# Patient Record
Sex: MALE | Race: OTHER | NOT HISPANIC OR LATINO | Employment: FULL TIME | URBAN - METROPOLITAN AREA
[De-identification: names, ages, dates, MRNs, and addresses within clinical notes are randomized per-mention and may not be internally consistent; named-entity substitution may affect disease eponyms.]

---

## 2017-11-04 ENCOUNTER — NON-PROVIDER VISIT (OUTPATIENT)
Dept: URGENT CARE | Facility: CLINIC | Age: 42
End: 2017-11-04

## 2017-11-04 DIAGNOSIS — Z02.1 PRE-EMPLOYMENT DRUG SCREENING: ICD-10-CM

## 2017-11-04 LAB
AMP AMPHETAMINE: NORMAL
COC COCAINE: NORMAL
INT CON NEG: NORMAL
INT CON POS: NORMAL
MET METHAMPHETAMINES: NORMAL
OPI OPIATES: NORMAL
PCP PHENCYCLIDINE: NORMAL
POC DRUG COMMENT 753798-OCCUPATIONAL HEALTH: NORMAL
THC: NORMAL

## 2017-11-04 PROCEDURE — 80305 DRUG TEST PRSMV DIR OPT OBS: CPT | Performed by: NURSE PRACTITIONER

## 2018-07-19 ENCOUNTER — HOSPITAL ENCOUNTER (EMERGENCY)
Facility: MEDICAL CENTER | Age: 43
End: 2018-07-19
Attending: EMERGENCY MEDICINE
Payer: MEDICAID

## 2018-07-19 VITALS
WEIGHT: 150 LBS | BODY MASS INDEX: 23.54 KG/M2 | HEIGHT: 67 IN | DIASTOLIC BLOOD PRESSURE: 79 MMHG | HEART RATE: 88 BPM | TEMPERATURE: 98.9 F | SYSTOLIC BLOOD PRESSURE: 108 MMHG | OXYGEN SATURATION: 100 % | RESPIRATION RATE: 16 BRPM

## 2018-07-19 DIAGNOSIS — M54.50 ACUTE MIDLINE LOW BACK PAIN WITHOUT SCIATICA: ICD-10-CM

## 2018-07-19 PROCEDURE — 99284 EMERGENCY DEPT VISIT MOD MDM: CPT

## 2018-07-19 PROCEDURE — 96372 THER/PROPH/DIAG INJ SC/IM: CPT

## 2018-07-19 PROCEDURE — 700111 HCHG RX REV CODE 636 W/ 250 OVERRIDE (IP): Performed by: EMERGENCY MEDICINE

## 2018-07-19 RX ORDER — KETOROLAC TROMETHAMINE 30 MG/ML
60 INJECTION, SOLUTION INTRAMUSCULAR; INTRAVENOUS ONCE
Status: COMPLETED | OUTPATIENT
Start: 2018-07-19 | End: 2018-07-19

## 2018-07-19 RX ORDER — DICLOFENAC SODIUM 75 MG/1
75 TABLET, DELAYED RELEASE ORAL 2 TIMES DAILY
Qty: 60 TAB | Refills: 0 | Status: SHIPPED | OUTPATIENT
Start: 2018-07-19 | End: 2023-06-14

## 2018-07-19 RX ORDER — CYCLOBENZAPRINE HCL 5 MG
5-10 TABLET ORAL 3 TIMES DAILY PRN
Qty: 10 TAB | Refills: 0 | Status: SHIPPED | OUTPATIENT
Start: 2018-07-19 | End: 2021-03-27

## 2018-07-19 RX ADMIN — KETOROLAC TROMETHAMINE 60 MG: 30 INJECTION, SOLUTION INTRAMUSCULAR at 13:59

## 2018-07-19 ASSESSMENT — PAIN SCALES - GENERAL
PAINLEVEL_OUTOF10: 10
PAINLEVEL_OUTOF10: 8

## 2018-07-19 NOTE — ED TRIAGE NOTES
Govind Corral  42 y.o.  Chief Complaint   Patient presents with   • Low Back Pain     Starting yesterday after lifting equipment at home. Pt states he has never had this pain before.      Pt self ambulatory to triage room, steady gait.     Triage process explained to patient, educated pt to notify staff at  if s/s worsened, apologized for wait time, and returned pt to lobby.

## 2018-07-19 NOTE — ED PROVIDER NOTES
"ED Provider Note    Scribed for Sissy Ríos M.D. by Emma Arnett. 7/19/2018, 1:34 PM.    Primary care provider: Pcp Pt States None  Means of arrival: walk in   History obtained from: patient   History limited by: none     CHIEF COMPLAINT  Chief Complaint   Patient presents with   • Low Back Pain     Starting yesterday after lifting equipment at home. Pt states he has never had this pain before.        HPI  Govind Corral is a 42 y.o. male who presents to the Emergency Department for evaluation of low back pain onset yesterday. Patient reports he was removing lug nuts from his car when his symptoms began. Patient's pain is non radiating. His pain is worse to his right side. He describes his pain as sharp in nature and his pain is exacerbated with movement. No history of cancer, IV drug use. No complaints of urinary or bowel incontinence.     REVIEW OF SYSTEMS  Pertinent positives include low back pain. Pertinent negatives include no urinary or bowel incontinence. E.    PAST MEDICAL HISTORY   No pertinent past medical history     SURGICAL HISTORY  patient denies any surgical history    SOCIAL HISTORY  Social History   Substance Use Topics   • Smoking status: Current Every Day Smoker     Packs/day: 0.50     Types: Cigarettes   • Smokeless tobacco: None noted    • Alcohol use No      History   Drug Use No       FAMILY HISTORY  No family history noted    CURRENT MEDICATIONS  Current medications can be reviewed in the nurse's note.     ALLERGIES  Allergies   Allergen Reactions   • Amoxicillin    • Pcn [Penicillins] Hives       PHYSICAL EXAM  VITAL SIGNS: /74   Pulse 86   Temp 37.2 °C (98.9 °F) (Temporal)   Resp 18   Ht 1.702 m (5' 7\")   Wt 68 kg (150 lb)   SpO2 99%   BMI 23.49 kg/m²     Constitutional:  Patient is laying down uncomfortable appearing, able to answer questions  HENT: Nose is normal in appearance without rhinorrhea, external ears are normal,  moist mucous membranes  Eyes: Anicteric,  " "pupils are equal round and reactive, there is no conjunctival drainage or pallor   Neck: The trachea is midline, there is no obvious mass or meningeal signs  Cardiovascular: Equal radial pulsation  Thorax & Lungs: Respiratory rate and effort are normal. There is normal chest excursion with respiration.   Abdomen: Abdomen is normal in appearance  :  No CVA tenderness to palpation  Back: Tenderness to the right lower lumbar, no midline tenderness.   Musculoskeletal: No deformities noted in all 4 extremities. Actively moves all 4 extremities.   Skin: Visualized skin is warm, no erythema, no rash.  Neurologic:  Cranial nerves II through XII are intact there is no focal abnormality noted. No clonus. Normal strength  Psychiatric: Normal mood and mentation    COURSE & MEDICAL DECISION MAKING  Nursing notes and vital signs were reviewed. (See chart for details)  The patient's  records were reviewed, history was obtained from the patient;     The patient presents with low back pain, and the differential diagnosis includes but is not limited to muscular spasm/strain, no signs of acute cord syndrome.  .       1:34 PM The patient received Toradol 60 mg for his symptoms. Informed patient that he will be sore for the next few days and I believe he is stable for discharge. I do not believe labs or imaging are required at this time.  I discussed strict return precautions and follow up instructions with the patient. He will be discharged home with Flexeril and Voltaren. Additionally, I will refer him to a back specialist if his symptoms persist. Patient understands.  Her vitals prior to discharge are: /79   Pulse 88   Temp 37.2 °C (98.9 °F) (Temporal)   Resp 16   Ht 1.702 m (5' 7\")   Wt 68 kg (150 lb)   SpO2 100%   BMI 23.49 kg/m²     The patient was discharged home with an information sheet on low back pain and told to return immediately for any signs or symptoms listed, but specifically if he experiences a fever, " numbness, worsening pain, or urinary or bowel incontinence.  The patient agreed to the discharge precautions and follow-up plan which is documented in EPIC.    The patient will return for new or worsening symptoms and is stable at the time of discharge.    The patient is referred to a primary physician for blood pressure management, diabetic screening, and for all other preventative health concerns.      DISPOSITION:  Patient will be discharged home in stable condition.    FOLLOW UP:  Nick Bradley M.D.  [address]  [address]  [facility] [address]  [phone]    Schedule an appointment as soon as possible for a visit in 1 day  return if fever, numbness or loss of bowel/bladder fuction      OUTPATIENT MEDICATIONS:  New Prescriptions    CYCLOBENZAPRINE (FLEXERIL) 5 MG TABLET    Take 1-2 Tabs by mouth 3 times a day as needed.    DICLOFENAC EC (VOLTAREN) 75 MG TABLET DELAYED RESPONSE    Take 1 Tab by mouth 2 times a day.      FINAL IMPRESSION  1. Acute midline low back pain without sciatica         IEmma (Jose), am scribing for, and in the presence of, Sissy Ríos M.D..    Electronically signed by: Emma Arnett (Jose), 7/19/2018    ISissy M.D. personally performed the services described in this documentation, as scribed by Emma Arnett in my presence, and it is both accurate and complete.    The note accurately reflects work and decisions made by me.  Sissy Ríos  7/19/2018  7:27 PM

## 2018-07-19 NOTE — DISCHARGE INSTRUCTIONS
Dolor de espalda en adultos  (Back Pain, Adult)  El dolor de espalda es muy frecuente. A menudo mejora con el tiempo. La causa del dolor de espalda generalmente no es peligrosa. La mayoría de las personas puede aprender a manejar el dolor de espalda por sí mismas.  CUIDADOS EN EL HOGAR  Controle morales dolor de espalda a fin de detectar algún cambio. Las siguientes indicaciones ayudarán a aliviar cualquier dolor que pueda sentir:  · Manténgase activo. Comience con caminatas cortas sobre superficies álvaro si es posible. Trate de caminar un poco más cada día.  · Valarie ejercicios con regularidad dino philly le indicó el médico. El ejercicio ayuda a que morales espalda se cure más rápidamente. También ayuda a prevenir futuras lesiones al mantener los músculos saige y flexibles.  · No se siente, conduzca ni permanezca de pie kaylee más de 30 minutos.  · No permanezca en la cama. Si hace reposo más de 1 a 2 días, puede demorar morales recuperación.  · Sea cuidadoso al inclinarse o levantar un objeto. Use bijal técnica apropiada para levantar peso:  ¨ Flexione las rodillas.  ¨ Mantenga el objeto cerca del cuerpo.  ¨ No gire.  · Duerma sobre un colchón firme. Recuéstese sobre un costado y flexione las rodillas. Si se recuesta sobre la espalda, coloque bijal almohada debajo de las rodillas.  · [place] los medicamentos solamente philly se lo haya indicado el médico.  · Aplique hielo sobre la leana lesionada.  ¨ Ponga el hielo en bijal bolsa plástica.  ¨ Coloque bijal toalla entre la piel y la bolsa de hielo.  ¨ Deje el hielo kaylee 20 minutos, 2 a 3 veces por día, kaylee los primeros 2 o 3 días. Después de eso, puede alternar entre compresas de hielo y calor.  · Evite sentir ansiedad o estrés. Encuentre maneras efectivas de lidiar con el estrés, philly hacer ejercicio.  · Mantenga un peso saludable. El peso excesivo ejerce tensión sobre la espalda.  SOLICITE AYUDA SI:  · Siente dolor que no se evelyn con reposo o medicamentos.  · Siente cada vez más  dolor que se extiende a las piernas o los glúteos.  · El dolor no mejora en bijal semana.  · Siente dolor por la noche.  · Pierde peso.  · Siente escalofríos o fiebre.  SOLICITE AYUDA DE INMEDIATO SI:  · No puede controlar morales materia fecal (heces) o el pis (orina).  · Siente debilidad en las piernas o los brazos.  · Siente pérdida de la sensibilidad (adormecimiento) en las piernas o los brazos.  · Tiene malestar estomacal (náuseas) o vomita.  · Siente dolor de estómago (abdominal).  · Siente que se desvanece (se desmaya).  Esta información no tiene philly fin reemplazar el consejo del médico. Asegúrese de hacerle al médico cualquier pregunta que tenga.  Document Released: 07/02/2012 Document Revised: 01/08/2016 Document Reviewed: 04/21/2015  [employer] Interactive Patient Education © 2017 [employer]

## 2018-10-17 ENCOUNTER — NON-PROVIDER VISIT (OUTPATIENT)
Dept: URGENT CARE | Facility: PHYSICIAN GROUP | Age: 43
End: 2018-10-17
Payer: MEDICAID

## 2018-10-17 DIAGNOSIS — Z02.1 PRE-EMPLOYMENT DRUG SCREENING: ICD-10-CM

## 2018-10-17 LAB
AMP AMPHETAMINE: NORMAL
COC COCAINE: NORMAL
INT CON NEG: NORMAL
INT CON POS: NORMAL
MET METHAMPHETAMINES: NORMAL
OPI OPIATES: NORMAL
PCP PHENCYCLIDINE: NORMAL
POC DRUG COMMENT 753798-OCCUPATIONAL HEALTH: NEGATIVE
THC: NORMAL

## 2018-10-17 PROCEDURE — 80305 DRUG TEST PRSMV DIR OPT OBS: CPT | Performed by: NURSE PRACTITIONER

## 2019-04-17 ENCOUNTER — OFFICE VISIT (OUTPATIENT)
Dept: URGENT CARE | Facility: PHYSICIAN GROUP | Age: 44
End: 2019-04-17
Payer: MEDICAID

## 2019-04-17 VITALS
BODY MASS INDEX: 24.64 KG/M2 | OXYGEN SATURATION: 98 % | WEIGHT: 157 LBS | DIASTOLIC BLOOD PRESSURE: 82 MMHG | TEMPERATURE: 66 F | RESPIRATION RATE: 13 BRPM | HEIGHT: 67 IN | SYSTOLIC BLOOD PRESSURE: 130 MMHG

## 2019-04-17 DIAGNOSIS — Z02.4 ENCOUNTER FOR DEPARTMENT OF TRANSPORTATION (DOT) EXAMINATION FOR DRIVING LICENSE RENEWAL: ICD-10-CM

## 2019-04-17 PROCEDURE — 7100 PR DOT PHYSICAL: Performed by: PHYSICIAN ASSISTANT

## 2019-04-17 NOTE — PROGRESS NOTES
"Subjective:      Govind Corral is a 43 y.o. male who presents with Employment Physical (DOT)    Pt PMH, SocHx, SurgHx, FamHx, Drug allergies and medications reviewed with pt/EPIC.      Family history reviewed, it is not pertinent to this complaint.           DOT exam        ROS  See scanned documents for ROS results.       Objective:     /82   Temp (!) 18.9 °C (66 °F)   Resp 13   Ht 1.702 m (5' 7\")   Wt 71.2 kg (157 lb)   SpO2 98%   BMI 24.59 kg/m²      Physical Exam       See scanned documents for exam results.    Assessment/Plan:     1. Encounter for Department of Transportation (DOT) examination for driving license renewal       Cleared for 2 years.   "

## 2019-06-04 ENCOUNTER — HOSPITAL ENCOUNTER (INPATIENT)
Facility: MEDICAL CENTER | Age: 44
LOS: 7 days | DRG: 963 | End: 2019-06-11
Attending: EMERGENCY MEDICINE | Admitting: SURGERY
Payer: MEDICAID

## 2019-06-04 ENCOUNTER — APPOINTMENT (OUTPATIENT)
Dept: RADIOLOGY | Facility: MEDICAL CENTER | Age: 44
DRG: 963 | End: 2019-06-04
Attending: SURGERY
Payer: MEDICAID

## 2019-06-04 ENCOUNTER — APPOINTMENT (OUTPATIENT)
Dept: RADIOLOGY | Facility: MEDICAL CENTER | Age: 44
DRG: 963 | End: 2019-06-04
Attending: EMERGENCY MEDICINE
Payer: MEDICAID

## 2019-06-04 ENCOUNTER — APPOINTMENT (OUTPATIENT)
Dept: RADIOLOGY | Facility: MEDICAL CENTER | Age: 44
DRG: 963 | End: 2019-06-04
Attending: NURSE PRACTITIONER
Payer: MEDICAID

## 2019-06-04 DIAGNOSIS — S02.85XA CLOSED FRACTURE OF LEFT ORBIT, INITIAL ENCOUNTER (HCC): ICD-10-CM

## 2019-06-04 DIAGNOSIS — S02.401A MAXILLARY SINUS FRACTURE, CLOSED, INITIAL ENCOUNTER (HCC): ICD-10-CM

## 2019-06-04 DIAGNOSIS — S06.6X1A TRAUMATIC SUBARACHNOID HEMORRHAGE WITH LOSS OF CONSCIOUSNESS OF 30 MINUTES OR LESS, INITIAL ENCOUNTER (HCC): ICD-10-CM

## 2019-06-04 DIAGNOSIS — S02.91XA CLOSED FRACTURE OF SKULL, UNSPECIFIED BONE, INITIAL ENCOUNTER (HCC): ICD-10-CM

## 2019-06-04 DIAGNOSIS — T14.90XA TRAUMA: ICD-10-CM

## 2019-06-04 DIAGNOSIS — S06.331A INTRAPARENCHYMAL HEMATOMA OF BRAIN WITH LOSS OF CONSCIOUSNESS OF 30 MINUTES OR LESS, UNSPECIFIED LATERALITY, INITIAL ENCOUNTER (HCC): ICD-10-CM

## 2019-06-04 PROBLEM — S02.92XB MULTIPLE FACIAL FRACTURES, OPEN, INITIAL ENCOUNTER (HCC): Status: ACTIVE | Noted: 2019-06-04

## 2019-06-04 PROBLEM — S06.89AA INTRACRANIAL HEMATOMA (HCC): Status: ACTIVE | Noted: 2019-06-04

## 2019-06-04 PROBLEM — S02.118A FRACTURE OF CLIVUS OF OCCIPITAL BONE (HCC): Status: ACTIVE | Noted: 2019-06-04

## 2019-06-04 PROBLEM — S27.322A BILATERAL PULMONARY CONTUSION: Status: ACTIVE | Noted: 2019-06-04

## 2019-06-04 PROBLEM — I65.02 OCCLUSION OF LEFT VERTEBRAL ARTERY: Status: ACTIVE | Noted: 2019-06-04

## 2019-06-04 PROBLEM — J96.90 RESPIRATORY FAILURE FOLLOWING TRAUMA (HCC): Status: ACTIVE | Noted: 2019-06-04

## 2019-06-04 PROBLEM — S02.92XA FACIAL FRACTURE (HCC): Status: ACTIVE | Noted: 2019-06-04

## 2019-06-04 PROBLEM — R91.1 LUNG NODULE: Status: ACTIVE | Noted: 2019-06-04

## 2019-06-04 PROBLEM — Z53.09 CONTRAINDICATION TO DEEP VEIN THROMBOSIS (DVT) PROPHYLAXIS: Status: ACTIVE | Noted: 2019-06-04

## 2019-06-04 LAB
ABO + RH BLD: NORMAL
ABO GROUP BLD: NORMAL
ALBUMIN SERPL BCP-MCNC: 3.3 G/DL (ref 3.2–4.9)
ALBUMIN/GLOB SERPL: 1.6 G/DL
ALP SERPL-CCNC: 44 U/L (ref 30–99)
ALT SERPL-CCNC: 15 U/L (ref 2–50)
ANION GAP SERPL CALC-SCNC: 5 MMOL/L (ref 0–11.9)
APTT PPP: 24.7 SEC (ref 24.7–36)
AST SERPL-CCNC: 19 U/L (ref 12–45)
BASE EXCESS BLDA CALC-SCNC: -5 MMOL/L (ref -4–3)
BASOPHILS # BLD AUTO: 0.3 % (ref 0–1.8)
BASOPHILS # BLD: 0.03 K/UL (ref 0–0.12)
BILIRUB SERPL-MCNC: 0.7 MG/DL (ref 0.1–1.5)
BLD GP AB SCN SERPL QL: NORMAL
BODY TEMPERATURE: ABNORMAL CENTIGRADE
BUN SERPL-MCNC: 12 MG/DL (ref 8–22)
CALCIUM SERPL-MCNC: 7.2 MG/DL (ref 8.5–10.5)
CFT BLD TEG: 3.5 MIN (ref 5–10)
CHLORIDE SERPL-SCNC: 110 MMOL/L (ref 96–112)
CLOT ANGLE BLD TEG: 69 DEGREES (ref 53–72)
CLOT LYSIS 30M P MA LENFR BLD TEG: 0.3 % (ref 0–8)
CO2 SERPL-SCNC: 24 MMOL/L (ref 20–33)
CREAT SERPL-MCNC: 0.84 MG/DL (ref 0.5–1.4)
CT.EXTRINSIC BLD ROTEM: 1.6 MIN (ref 1–3)
EOSINOPHIL # BLD AUTO: 0.19 K/UL (ref 0–0.51)
EOSINOPHIL NFR BLD: 1.7 % (ref 0–6.9)
ERYTHROCYTE [DISTWIDTH] IN BLOOD BY AUTOMATED COUNT: 45.3 FL (ref 35.9–50)
ETHANOL BLD-MCNC: 0 G/DL
GLOBULIN SER CALC-MCNC: 2.1 G/DL (ref 1.9–3.5)
GLUCOSE SERPL-MCNC: 131 MG/DL (ref 65–99)
HCO3 BLDA-SCNC: 21 MMOL/L (ref 17–25)
HCT VFR BLD AUTO: 36.6 % (ref 42–52)
HGB BLD-MCNC: 12.4 G/DL (ref 14–18)
IMM GRANULOCYTES # BLD AUTO: 0.05 K/UL (ref 0–0.11)
IMM GRANULOCYTES NFR BLD AUTO: 0.5 % (ref 0–0.9)
INR PPP: 1.15 (ref 0.87–1.13)
LACTATE BLD-SCNC: 1 MMOL/L (ref 0.5–2)
LYMPHOCYTES # BLD AUTO: 1.18 K/UL (ref 1–4.8)
LYMPHOCYTES NFR BLD: 10.9 % (ref 22–41)
MAGNESIUM SERPL-MCNC: 1.4 MG/DL (ref 1.5–2.5)
MCF BLD TEG: 60.3 MM (ref 50–70)
MCH RBC QN AUTO: 31.7 PG (ref 27–33)
MCHC RBC AUTO-ENTMCNC: 33.9 G/DL (ref 33.7–35.3)
MCV RBC AUTO: 93.6 FL (ref 81.4–97.8)
MONOCYTES # BLD AUTO: 0.77 K/UL (ref 0–0.85)
MONOCYTES NFR BLD AUTO: 7.1 % (ref 0–13.4)
NEUTROPHILS # BLD AUTO: 8.64 K/UL (ref 1.82–7.42)
NEUTROPHILS NFR BLD: 79.5 % (ref 44–72)
NRBC # BLD AUTO: 0 K/UL
NRBC BLD-RTO: 0 /100 WBC
PA AA BLD-ACNC: 0 %
PA ADP BLD-ACNC: 11.2 %
PCO2 BLDA: 43.1 MMHG (ref 26–37)
PH BLDA: 7.31 [PH] (ref 7.4–7.5)
PHOSPHATE SERPL-MCNC: 2.9 MG/DL (ref 2.5–4.5)
PLATELET # BLD AUTO: 186 K/UL (ref 164–446)
PMV BLD AUTO: 10.1 FL (ref 9–12.9)
PO2 BLDA: 173.8 MMHG (ref 64–87)
POTASSIUM SERPL-SCNC: 3.3 MMOL/L (ref 3.6–5.5)
PROT SERPL-MCNC: 5.4 G/DL (ref 6–8.2)
PROTHROMBIN TIME: 14.9 SEC (ref 12–14.6)
RBC # BLD AUTO: 3.91 M/UL (ref 4.7–6.1)
RH BLD: NORMAL
SAO2 % BLDA: 98.6 % (ref 93–99)
SODIUM SERPL-SCNC: 139 MMOL/L (ref 135–145)
TEG ALGORITHM TGALG: ABNORMAL
TRIGL SERPL-MCNC: 92 MG/DL (ref 0–149)
WBC # BLD AUTO: 10.9 K/UL (ref 4.8–10.8)

## 2019-06-04 PROCEDURE — 304999 HCHG REPAIR-SIMPLE/INTERMED LEVEL 1

## 2019-06-04 PROCEDURE — 83735 ASSAY OF MAGNESIUM: CPT

## 2019-06-04 PROCEDURE — 305308 HCHG STAPLER,SKIN,DISP.

## 2019-06-04 PROCEDURE — 82803 BLOOD GASES ANY COMBINATION: CPT

## 2019-06-04 PROCEDURE — 770022 HCHG ROOM/CARE - ICU (200)

## 2019-06-04 PROCEDURE — 85576 BLOOD PLATELET AGGREGATION: CPT | Mod: 91

## 2019-06-04 PROCEDURE — 84478 ASSAY OF TRIGLYCERIDES: CPT

## 2019-06-04 PROCEDURE — 84100 ASSAY OF PHOSPHORUS: CPT

## 2019-06-04 PROCEDURE — 72128 CT CHEST SPINE W/O DYE: CPT

## 2019-06-04 PROCEDURE — 0BH17EZ INSERTION OF ENDOTRACHEAL AIRWAY INTO TRACHEA, VIA NATURAL OR ARTIFICIAL OPENING: ICD-10-PCS | Performed by: EMERGENCY MEDICINE

## 2019-06-04 PROCEDURE — 80307 DRUG TEST PRSMV CHEM ANLYZR: CPT

## 2019-06-04 PROCEDURE — 85025 COMPLETE CBC W/AUTO DIFF WBC: CPT

## 2019-06-04 PROCEDURE — 86901 BLOOD TYPING SEROLOGIC RH(D): CPT

## 2019-06-04 PROCEDURE — 85730 THROMBOPLASTIN TIME PARTIAL: CPT

## 2019-06-04 PROCEDURE — 71045 X-RAY EXAM CHEST 1 VIEW: CPT

## 2019-06-04 PROCEDURE — 31500 INSERT EMERGENCY AIRWAY: CPT

## 2019-06-04 PROCEDURE — 85610 PROTHROMBIN TIME: CPT

## 2019-06-04 PROCEDURE — 700101 HCHG RX REV CODE 250: Performed by: SURGERY

## 2019-06-04 PROCEDURE — 70486 CT MAXILLOFACIAL W/O DYE: CPT

## 2019-06-04 PROCEDURE — 70450 CT HEAD/BRAIN W/O DYE: CPT

## 2019-06-04 PROCEDURE — 302135 SEQUENTIAL COMPRESSION MACHINE: Performed by: SURGERY

## 2019-06-04 PROCEDURE — 72131 CT LUMBAR SPINE W/O DYE: CPT

## 2019-06-04 PROCEDURE — 71260 CT THORAX DX C+: CPT

## 2019-06-04 PROCEDURE — 5A1945Z RESPIRATORY VENTILATION, 24-96 CONSECUTIVE HOURS: ICD-10-PCS | Performed by: EMERGENCY MEDICINE

## 2019-06-04 PROCEDURE — 94002 VENT MGMT INPAT INIT DAY: CPT

## 2019-06-04 PROCEDURE — 700117 HCHG RX CONTRAST REV CODE 255: Performed by: EMERGENCY MEDICINE

## 2019-06-04 PROCEDURE — 83605 ASSAY OF LACTIC ACID: CPT

## 2019-06-04 PROCEDURE — 70496 CT ANGIOGRAPHY HEAD: CPT

## 2019-06-04 PROCEDURE — 700117 HCHG RX CONTRAST REV CODE 255: Performed by: SURGERY

## 2019-06-04 PROCEDURE — 72125 CT NECK SPINE W/O DYE: CPT

## 2019-06-04 PROCEDURE — 85384 FIBRINOGEN ACTIVITY: CPT

## 2019-06-04 PROCEDURE — 99291 CRITICAL CARE FIRST HOUR: CPT

## 2019-06-04 PROCEDURE — G0390 TRAUMA RESPONS W/HOSP CRITI: HCPCS

## 2019-06-04 PROCEDURE — 85347 COAGULATION TIME ACTIVATED: CPT

## 2019-06-04 PROCEDURE — 86900 BLOOD TYPING SEROLOGIC ABO: CPT

## 2019-06-04 PROCEDURE — 72170 X-RAY EXAM OF PELVIS: CPT

## 2019-06-04 PROCEDURE — 80053 COMPREHEN METABOLIC PANEL: CPT

## 2019-06-04 PROCEDURE — 86850 RBC ANTIBODY SCREEN: CPT

## 2019-06-04 PROCEDURE — 70498 CT ANGIOGRAPHY NECK: CPT

## 2019-06-04 PROCEDURE — 700105 HCHG RX REV CODE 258: Performed by: SURGERY

## 2019-06-04 PROCEDURE — 700111 HCHG RX REV CODE 636 W/ 250 OVERRIDE (IP): Performed by: SURGERY

## 2019-06-04 RX ORDER — MORPHINE SULFATE 4 MG/ML
4 INJECTION, SOLUTION INTRAMUSCULAR; INTRAVENOUS
Status: DISCONTINUED | OUTPATIENT
Start: 2019-06-04 | End: 2019-06-04

## 2019-06-04 RX ORDER — ONDANSETRON 2 MG/ML
4 INJECTION INTRAMUSCULAR; INTRAVENOUS EVERY 4 HOURS PRN
Status: DISCONTINUED | OUTPATIENT
Start: 2019-06-04 | End: 2019-06-10

## 2019-06-04 RX ORDER — BISACODYL 10 MG
10 SUPPOSITORY, RECTAL RECTAL
Status: DISCONTINUED | OUTPATIENT
Start: 2019-06-04 | End: 2019-06-11 | Stop reason: HOSPADM

## 2019-06-04 RX ORDER — DOCUSATE SODIUM 50 MG/5ML
100 LIQUID ORAL 2 TIMES DAILY
Status: DISCONTINUED | OUTPATIENT
Start: 2019-06-04 | End: 2019-06-09

## 2019-06-04 RX ORDER — AMOXICILLIN 250 MG
1 CAPSULE ORAL NIGHTLY
Status: DISCONTINUED | OUTPATIENT
Start: 2019-06-04 | End: 2019-06-09

## 2019-06-04 RX ORDER — CALCIUM GLUCONATE 94 MG/ML
1 INJECTION, SOLUTION INTRAVENOUS ONCE
Status: DISCONTINUED | OUTPATIENT
Start: 2019-06-04 | End: 2019-06-04

## 2019-06-04 RX ORDER — CIPROFLOXACIN 2 MG/ML
400 INJECTION, SOLUTION INTRAVENOUS EVERY 12 HOURS
Status: DISCONTINUED | OUTPATIENT
Start: 2019-06-04 | End: 2019-06-09

## 2019-06-04 RX ORDER — AMOXICILLIN 250 MG
1 CAPSULE ORAL
Status: DISCONTINUED | OUTPATIENT
Start: 2019-06-04 | End: 2019-06-09

## 2019-06-04 RX ORDER — ENEMA 19; 7 G/133ML; G/133ML
1 ENEMA RECTAL
Status: DISCONTINUED | OUTPATIENT
Start: 2019-06-04 | End: 2019-06-11 | Stop reason: HOSPADM

## 2019-06-04 RX ORDER — SODIUM CHLORIDE AND POTASSIUM CHLORIDE 150; 900 MG/100ML; MG/100ML
INJECTION, SOLUTION INTRAVENOUS CONTINUOUS
Status: DISCONTINUED | OUTPATIENT
Start: 2019-06-04 | End: 2019-06-09

## 2019-06-04 RX ORDER — SODIUM CHLORIDE, SODIUM LACTATE, POTASSIUM CHLORIDE, CALCIUM CHLORIDE 600; 310; 30; 20 MG/100ML; MG/100ML; MG/100ML; MG/100ML
INJECTION, SOLUTION INTRAVENOUS CONTINUOUS
Status: DISCONTINUED | OUTPATIENT
Start: 2019-06-04 | End: 2019-06-04

## 2019-06-04 RX ORDER — POLYETHYLENE GLYCOL 3350 17 G/17G
1 POWDER, FOR SOLUTION ORAL 2 TIMES DAILY
Status: DISCONTINUED | OUTPATIENT
Start: 2019-06-04 | End: 2019-06-09

## 2019-06-04 RX ORDER — HYDROMORPHONE HYDROCHLORIDE 2 MG/ML
1 INJECTION, SOLUTION INTRAMUSCULAR; INTRAVENOUS; SUBCUTANEOUS
Status: DISCONTINUED | OUTPATIENT
Start: 2019-06-04 | End: 2019-06-07

## 2019-06-04 RX ORDER — FAMOTIDINE 20 MG/1
20 TABLET, FILM COATED ORAL 2 TIMES DAILY
Status: DISCONTINUED | OUTPATIENT
Start: 2019-06-04 | End: 2019-06-07

## 2019-06-04 RX ADMIN — PROPOFOL 30 MCG/KG/MIN: 10 INJECTION, EMULSION INTRAVENOUS at 18:46

## 2019-06-04 RX ADMIN — PROPOFOL 40 MCG/KG/MIN: 10 INJECTION, EMULSION INTRAVENOUS at 23:11

## 2019-06-04 RX ADMIN — FAMOTIDINE 20 MG: 10 INJECTION INTRAVENOUS at 18:08

## 2019-06-04 RX ADMIN — POTASSIUM CHLORIDE AND SODIUM CHLORIDE: 900; 150 INJECTION, SOLUTION INTRAVENOUS at 22:14

## 2019-06-04 RX ADMIN — SODIUM CHLORIDE 500 MG: 9 INJECTION, SOLUTION INTRAVENOUS at 18:06

## 2019-06-04 RX ADMIN — MORPHINE SULFATE 4 MG: 4 INJECTION INTRAVENOUS at 12:32

## 2019-06-04 RX ADMIN — CALCIUM GLUCONATE 1 G: 98 INJECTION, SOLUTION INTRAVENOUS at 12:40

## 2019-06-04 RX ADMIN — PROPOFOL 20 MCG/KG/MIN: 10 INJECTION, EMULSION INTRAVENOUS at 10:49

## 2019-06-04 RX ADMIN — IOHEXOL 100 ML: 350 INJECTION, SOLUTION INTRAVENOUS at 16:30

## 2019-06-04 RX ADMIN — HYDROMORPHONE HYDROCHLORIDE 1 MG: 2 INJECTION, SOLUTION INTRAMUSCULAR; INTRAVENOUS; SUBCUTANEOUS at 16:20

## 2019-06-04 RX ADMIN — SODIUM CHLORIDE, POTASSIUM CHLORIDE, SODIUM LACTATE AND CALCIUM CHLORIDE: 600; 310; 30; 20 INJECTION, SOLUTION INTRAVENOUS at 10:49

## 2019-06-04 RX ADMIN — HYDROMORPHONE HYDROCHLORIDE 1 MG: 2 INJECTION, SOLUTION INTRAMUSCULAR; INTRAVENOUS; SUBCUTANEOUS at 22:13

## 2019-06-04 RX ADMIN — IOHEXOL 100 ML: 350 INJECTION, SOLUTION INTRAVENOUS at 10:45

## 2019-06-04 RX ADMIN — CIPROFLOXACIN 400 MG: 2 INJECTION, SOLUTION INTRAVENOUS at 18:06

## 2019-06-04 RX ADMIN — POTASSIUM CHLORIDE AND SODIUM CHLORIDE: 900; 150 INJECTION, SOLUTION INTRAVENOUS at 12:05

## 2019-06-04 RX ADMIN — CIPROFLOXACIN 400 MG: 2 INJECTION, SOLUTION INTRAVENOUS at 11:19

## 2019-06-04 RX ADMIN — MORPHINE SULFATE 4 MG: 4 INJECTION INTRAVENOUS at 14:30

## 2019-06-04 RX ADMIN — FAMOTIDINE 20 MG: 10 INJECTION INTRAVENOUS at 11:12

## 2019-06-04 RX ADMIN — SODIUM CHLORIDE 500 MG: 9 INJECTION, SOLUTION INTRAVENOUS at 11:18
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
## 2019-06-04 NOTE — ASSESSMENT & PLAN NOTE
Skull base fracture involving the clivus and right petrous apex. The fracture line extends to the carotid canal. Left frontal bone fracture extending through the anterior and posterior walls of the frontal sinus.  Non-operative management.  Navarro Bernal MD. Neurosurgery (sign off 6/5).

## 2019-06-04 NOTE — CARE PLAN
Problem: Communication  Goal: The ability to communicate needs accurately and effectively will improve    Intervention: Educate patient and significant other/support system about the plan of care, procedures, treatments, medications and allow for questions  Discuss plan of care with care team and updated family, all questions and concerns answered. Family demonstrated understanding.       Problem: Pain Management  Goal: Pain level will decrease to patient's comfort goal    Intervention: Follow pain managment plan developed in collaboration with patient and Interdisciplinary Team  Appropriate pain measuring tool used for assessment. Addressing patients pain needs with appropriate interventions. Assessing pain interventions every two hours or/and PRN.
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
## 2019-06-04 NOTE — PROGRESS NOTES
Patient to s-114 at 1037    BP:  161/98  HR: 77  100% on ventilator  Temp:  96.3f    Patient is moving all extremities, not following commands  Pupils PEERLA at 3mm, cough, corneal and gag intact  c-collar in place  Patients belongings in room. Mom stated she will take belonging   2 RN skin check complete with CHARLES Toth.  Devices in place c-collar, BP cuff, EKG leads, pulse ox, scds placed, chu, peripheral IVs.   Skin assessed under the following devices noted above.   Preventative measures in place including mepilex, heel float boots, pillows and q2 turns .  Following areas of concern:   c-collar in place and assessed underneath  Large forehead laceration with staples, pictures taken  Redness noted to right shoulder, blanching  Generalized abrasions to bilateral LE  mepilex in place and no other concerns at this time       Wound consult placedYES/NO: N\A    Wound reported YES/NO: N\A  Appropriate LDAs opened YES/NO: N\A

## 2019-06-04 NOTE — ASSESSMENT & PLAN NOTE
Ill-defined nodular opacities are seen in the left upper lobe and lingula. Small nodules are seen in the right upper lobe and right middle lobe.   Follow-up outpatient recommended.

## 2019-06-04 NOTE — ASSESSMENT & PLAN NOTE
Initial systemic anticoagulation contraindicated secondary to elevated bleeding risk.  RAP score 7.  6/5 Trauma screening bilateral lower extremity venous duplex negative for above knee DVT.  6/7 Chemical DVT prophylaxis (Lovenox) initiated.  Ambulate TID.

## 2019-06-04 NOTE — ASSESSMENT & PLAN NOTE
Left frontal parenchymal contusion. Supra and infratentorial subarachnoid hemorrhage. Pneumocephalus.  Interval follow up CT head stable.  Non-operative management.  Post traumatic pharmacologic seizure prophylaxis for 1 week.  6/9 SLP recommend Speech Therapy 5 times per week.  Navarro Bernal MD. Neurosurgery (sign off 6/5).
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
## 2019-06-04 NOTE — PROGRESS NOTES
Dr. Pizano at bedside to assess patient, no surgical intervention at this time. Discussed with family as well   Asked MD if needed to place cortrak if it is appropriate with facial fractures, MD stated it is okay to place through nose if needed.
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
## 2019-06-04 NOTE — CARE PLAN
Problem: Ventilation Defect:  Goal: Ability to achieve and maintain unassisted ventilation or tolerate decreased levels of ventilator support  Outcome: PROGRESSING AS EXPECTED     Ventilator Daily Summary    Vent Day # 1    No SBT or mobility at this time    Plan: Continue current ventilator settings and wean mechanical ventilation as tolerated per physician orders.

## 2019-06-04 NOTE — ASSESSMENT & PLAN NOTE
Bilateral pulmonary contusions  6/6 CXR no acute findings  Aggressive multimodal pain management and pulmonary hygiene.
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
## 2019-06-04 NOTE — PROGRESS NOTES
1357: Clarified with Dr. Bernal about c-spine per cautions, per MD patient does not need to have c-spine held, orders to just keep c-collar in place at all times
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
## 2019-06-04 NOTE — PROGRESS NOTES
Spoke with Dr. Durbin about pain control new orders for hydromorphone 1mg q 30 min PRN, order read back to MD. Orders entered in epic

## 2019-06-04 NOTE — ASSESSMENT & PLAN NOTE
Left sided lateral orbital wall, medial orbital wall, orbital floor, orbital rim, frontal bone, zygomatic arch, posterior and anterior wall maxillary sinus - open fractures.  Non-operative management.   6/10 7 day course of antibiotics completed.  Katherine Pizano MD. Facial Surgery (sign off 6/4).

## 2019-06-05 ENCOUNTER — APPOINTMENT (OUTPATIENT)
Dept: RADIOLOGY | Facility: MEDICAL CENTER | Age: 44
DRG: 963 | End: 2019-06-05
Attending: SURGERY
Payer: MEDICAID

## 2019-06-05 ENCOUNTER — APPOINTMENT (OUTPATIENT)
Dept: RADIOLOGY | Facility: MEDICAL CENTER | Age: 44
DRG: 963 | End: 2019-06-05
Attending: NURSE PRACTITIONER
Payer: MEDICAID

## 2019-06-05 LAB
ACTION RANGE TRIGGERED IACRT: NO
ALBUMIN SERPL BCP-MCNC: 3 G/DL (ref 3.2–4.9)
ALBUMIN/GLOB SERPL: 1.4 G/DL
ALP SERPL-CCNC: 40 U/L (ref 30–99)
ALT SERPL-CCNC: 15 U/L (ref 2–50)
ANION GAP SERPL CALC-SCNC: 10 MMOL/L (ref 0–11.9)
AST SERPL-CCNC: 21 U/L (ref 12–45)
BASE EXCESS BLDA CALC-SCNC: -4 MMOL/L (ref -4–3)
BASOPHILS # BLD AUTO: 0.3 % (ref 0–1.8)
BASOPHILS # BLD: 0.03 K/UL (ref 0–0.12)
BILIRUB SERPL-MCNC: 1.2 MG/DL (ref 0.1–1.5)
BODY TEMPERATURE: ABNORMAL DEGREES
BUN SERPL-MCNC: 10 MG/DL (ref 8–22)
CALCIUM SERPL-MCNC: 7.9 MG/DL (ref 8.5–10.5)
CHLORIDE SERPL-SCNC: 111 MMOL/L (ref 96–112)
CO2 BLDA-SCNC: 19 MMOL/L (ref 20–33)
CO2 SERPL-SCNC: 18 MMOL/L (ref 20–33)
CREAT SERPL-MCNC: 1.05 MG/DL (ref 0.5–1.4)
EOSINOPHIL # BLD AUTO: 0.11 K/UL (ref 0–0.51)
EOSINOPHIL NFR BLD: 0.9 % (ref 0–6.9)
ERYTHROCYTE [DISTWIDTH] IN BLOOD BY AUTOMATED COUNT: 46.2 FL (ref 35.9–50)
GLOBULIN SER CALC-MCNC: 2.2 G/DL (ref 1.9–3.5)
GLUCOSE SERPL-MCNC: 117 MG/DL (ref 65–99)
HCO3 BLDA-SCNC: 18.2 MMOL/L (ref 17–25)
HCT VFR BLD AUTO: 33.4 % (ref 42–52)
HGB BLD-MCNC: 11.1 G/DL (ref 14–18)
HOROWITZ INDEX BLDA+IHG-RTO: 397 MM[HG]
IMM GRANULOCYTES # BLD AUTO: 0.04 K/UL (ref 0–0.11)
IMM GRANULOCYTES NFR BLD AUTO: 0.3 % (ref 0–0.9)
INST. QUALIFIED PATIENT IIQPT: YES
LYMPHOCYTES # BLD AUTO: 1.95 K/UL (ref 1–4.8)
LYMPHOCYTES NFR BLD: 16.7 % (ref 22–41)
MCH RBC QN AUTO: 31.1 PG (ref 27–33)
MCHC RBC AUTO-ENTMCNC: 33.2 G/DL (ref 33.7–35.3)
MCV RBC AUTO: 93.6 FL (ref 81.4–97.8)
MONOCYTES # BLD AUTO: 0.87 K/UL (ref 0–0.85)
MONOCYTES NFR BLD AUTO: 7.4 % (ref 0–13.4)
NEUTROPHILS # BLD AUTO: 8.71 K/UL (ref 1.82–7.42)
NEUTROPHILS NFR BLD: 74.4 % (ref 44–72)
NRBC # BLD AUTO: 0 K/UL
NRBC BLD-RTO: 0 /100 WBC
O2/TOTAL GAS SETTING VFR VENT: 30 %
PCO2 BLDA: 23.8 MMHG (ref 26–37)
PCO2 TEMP ADJ BLDA: 23.7 MMHG (ref 26–37)
PH BLDA: 7.49 [PH] (ref 7.4–7.5)
PH TEMP ADJ BLDA: 7.49 [PH] (ref 7.4–7.5)
PLATELET # BLD AUTO: 191 K/UL (ref 164–446)
PMV BLD AUTO: 10.6 FL (ref 9–12.9)
PO2 BLDA: 119 MMHG (ref 64–87)
PO2 TEMP ADJ BLDA: 118 MMHG (ref 64–87)
POTASSIUM SERPL-SCNC: 3.6 MMOL/L (ref 3.6–5.5)
PROT SERPL-MCNC: 5.2 G/DL (ref 6–8.2)
RBC # BLD AUTO: 3.57 M/UL (ref 4.7–6.1)
SAO2 % BLDA: 99 % (ref 93–99)
SODIUM SERPL-SCNC: 139 MMOL/L (ref 135–145)
SPECIMEN DRAWN FROM PATIENT: ABNORMAL
WBC # BLD AUTO: 11.7 K/UL (ref 4.8–10.8)

## 2019-06-05 PROCEDURE — 93970 EXTREMITY STUDY: CPT

## 2019-06-05 PROCEDURE — 85025 COMPLETE CBC W/AUTO DIFF WBC: CPT

## 2019-06-05 PROCEDURE — 71045 X-RAY EXAM CHEST 1 VIEW: CPT

## 2019-06-05 PROCEDURE — 700112 HCHG RX REV CODE 229: Performed by: SURGERY

## 2019-06-05 PROCEDURE — A9270 NON-COVERED ITEM OR SERVICE: HCPCS | Performed by: SURGERY

## 2019-06-05 PROCEDURE — 770022 HCHG ROOM/CARE - ICU (200)

## 2019-06-05 PROCEDURE — 80053 COMPREHEN METABOLIC PANEL: CPT

## 2019-06-05 PROCEDURE — 31500 INSERT EMERGENCY AIRWAY: CPT

## 2019-06-05 PROCEDURE — 94003 VENT MGMT INPAT SUBQ DAY: CPT

## 2019-06-05 PROCEDURE — 700105 HCHG RX REV CODE 258: Performed by: SURGERY

## 2019-06-05 PROCEDURE — 99291 CRITICAL CARE FIRST HOUR: CPT | Performed by: SURGERY

## 2019-06-05 PROCEDURE — 700102 HCHG RX REV CODE 250 W/ 637 OVERRIDE(OP): Performed by: SURGERY

## 2019-06-05 PROCEDURE — 700111 HCHG RX REV CODE 636 W/ 250 OVERRIDE (IP): Performed by: SURGERY

## 2019-06-05 PROCEDURE — 304999 HCHG REPAIR-SIMPLE/INTERMED LEVEL 1

## 2019-06-05 PROCEDURE — 93970 EXTREMITY STUDY: CPT | Mod: 26 | Performed by: SURGERY

## 2019-06-05 PROCEDURE — 302136 NUTRITION PUMP: Performed by: SURGERY

## 2019-06-05 PROCEDURE — 36600 WITHDRAWAL OF ARTERIAL BLOOD: CPT

## 2019-06-05 PROCEDURE — 99291 CRITICAL CARE FIRST HOUR: CPT

## 2019-06-05 PROCEDURE — 82803 BLOOD GASES ANY COMBINATION: CPT

## 2019-06-05 PROCEDURE — G0390 TRAUMA RESPONS W/HOSP CRITI: HCPCS

## 2019-06-05 PROCEDURE — 700101 HCHG RX REV CODE 250: Performed by: SURGERY

## 2019-06-05 RX ORDER — LEVETIRACETAM 500 MG/1
500 TABLET ORAL 2 TIMES DAILY
Status: DISCONTINUED | OUTPATIENT
Start: 2019-06-05 | End: 2019-06-09

## 2019-06-05 RX ADMIN — POTASSIUM CHLORIDE AND SODIUM CHLORIDE: 900; 150 INJECTION, SOLUTION INTRAVENOUS at 05:57

## 2019-06-05 RX ADMIN — CIPROFLOXACIN 400 MG: 2 INJECTION, SOLUTION INTRAVENOUS at 17:22

## 2019-06-05 RX ADMIN — POTASSIUM CHLORIDE AND SODIUM CHLORIDE: 900; 150 INJECTION, SOLUTION INTRAVENOUS at 23:15

## 2019-06-05 RX ADMIN — CIPROFLOXACIN 400 MG: 2 INJECTION, SOLUTION INTRAVENOUS at 05:49

## 2019-06-05 RX ADMIN — HYDROMORPHONE HYDROCHLORIDE 1 MG: 2 INJECTION, SOLUTION INTRAMUSCULAR; INTRAVENOUS; SUBCUTANEOUS at 04:15

## 2019-06-05 RX ADMIN — HYDROMORPHONE HYDROCHLORIDE 1 MG: 2 INJECTION, SOLUTION INTRAMUSCULAR; INTRAVENOUS; SUBCUTANEOUS at 13:41

## 2019-06-05 RX ADMIN — DOCUSATE SODIUM 100 MG: 50 LIQUID ORAL at 17:22

## 2019-06-05 RX ADMIN — HYDROMORPHONE HYDROCHLORIDE 1 MG: 2 INJECTION, SOLUTION INTRAMUSCULAR; INTRAVENOUS; SUBCUTANEOUS at 21:43

## 2019-06-05 RX ADMIN — POLYETHYLENE GLYCOL 3350 1 PACKET: 17 POWDER, FOR SOLUTION ORAL at 18:00

## 2019-06-05 RX ADMIN — SODIUM CHLORIDE 500 MG: 9 INJECTION, SOLUTION INTRAVENOUS at 04:57

## 2019-06-05 RX ADMIN — PROPOFOL 30 MCG/KG/MIN: 10 INJECTION, EMULSION INTRAVENOUS at 14:47

## 2019-06-05 RX ADMIN — LEVETIRACETAM 500 MG: 500 TABLET ORAL at 17:22

## 2019-06-05 RX ADMIN — SENNOSIDES,DOCUSATE SODIUM 1 TABLET: 50; 8.6 TABLET, FILM COATED ORAL at 21:45

## 2019-06-05 RX ADMIN — FAMOTIDINE 20 MG: 20 TABLET ORAL at 17:22

## 2019-06-05 RX ADMIN — FAMOTIDINE 20 MG: 10 INJECTION INTRAVENOUS at 04:57
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
## 2019-06-05 NOTE — CARE PLAN
Problem: Venous Thromboembolism (VTW)/Deep Vein Thrombosis (DVT) Prevention:  Goal: Patient will participate in Venous Thrombosis (VTE)/Deep Vein Thrombosis (DVT)Prevention Measures    Intervention: Ensure patient wears graduated elastic stockings (CARRIE hose) and/or SCDs, if ordered, when in bed or chair (Remove at least once per shift for skin check)  Pt wearing SCDs. SCDs applied appropriately. Skin check performed under SCDs. SCD machine on.      Problem: Pain Management  Goal: Pain level will decrease to patient's comfort goal    Intervention: Educate and implement non-pharmacologic comfort measures. Examples: relaxation, distration, play therapy, activity therapy, massage, etc.  Pt provided education on use of non-pharmacologic modes of comfort such as rest, repositioning and therapeutic presence. Pt does not show signs of learning. Pt family provided education. Pt family vocalized understanding. Will continue to reinforce teaching and monitor for learning.

## 2019-06-05 NOTE — ASSESSMENT & PLAN NOTE
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 The more distal basilar artery is patent. Patent carotid and vertebral arteries bilaterally within the neck.  Recommend aspirin, may initiate 2 weeks after injury due to head bleed (6/18).  Navarro Bernal MD. Neurosurgery (sign off 6/5).

## 2019-06-05 NOTE — PROGRESS NOTES
"TRAUMA TERTIARY SURVEY     Mental status adequate for full examination?: No    Spine cleared (radiologically and/or clinically): No    PHYSICAL EXAMINATION:  Vitals: /105   Pulse (!) 56   Temp (!) 35.7 °C (96.3 °F) ([place])   Resp 17   Ht 1.88 m (6' 2\")   Wt 71.3 kg (157 lb 3 oz)   SpO2 100%   BMI 20.18 kg/m²   Constitutional:     General Appearance: Intubated / Sedated .  HEENT:    Facial abrasions, edema, and ecchymosis . Multiple scalp lacerations well approximated with staples in place.  The pupils are equal, round, and reactive to light bilaterally.     Neck:    The cervical spine is immobilized with a hard collar.  Respiratory:   Inspection: Mechanical ventilation    Auscultation: clear to auscultation.  Cardiovascular:   Auscultation: normal.   Peripheral Pulses: Normal.   Abdomen:   Abdomen is soft, nontender, without organomegaly or masses.  Genitourinary:   (MALE): [place], urine clear.  Musculoskeletal:   The pelvis is stable.  Back:   No gross deformities   Skin:   The skin is warm.   Neurologic:    [place]ie Coma Scale (GCS) 8T E2V1M5. Neurologic examination revealed sedated  Psychiatric:   The patient unable to assess.    IMAGING:  DX-CHEST-PORTABLE (1 VIEW)   Final Result      1.  There is minimal linear left lower lobe atelectasis. Lungs otherwise appear clear.   2.  There has been slight advancement of the NG tube.      CT-CTA NECK WITH & W/O-POST PROCESSING   Final Result      1.  Patent carotid and vertebral arteries bilaterally within the neck.      2.  Again noted intracranial occlusion of the left vertebral artery.      3.  Again seen fracture of the clivus with the distal right vertebral and basilar artery seen extending into the area of the fracture site.      CT-CTA HEAD WITH & W/O-POST PROCESS   Final Result      Left vertebral artery becomes occluded intracranially. Distal right vertebral artery becomes diminutive with the artery extending to the clival fracture to the right of " midline. The proximal basilar is diminutive though not definitely occluded. The more    distal basilar artery is patent.      No carotid injury is identified.      Small amount of subarachnoid blood is again noted bilaterally.      Pneumocephalus is again seen.      There may be a small amount of blood along the tentorium.      Small amount of hemorrhage is seen in the interpeduncular cistern which is unchanged.      Skull base and extensive facial fractures are again noted.      Left frontal calvarial fracture is again seen.      Findings called to Dr. Durbin.      CT-MAXILLOFACIAL W/O PLUS RECONS   Final Result   Addendum 1 of 1   Additional   impression: There is additional fracture through the RIGHT orbital roof.    This was discussed with Dr. Pizano 11:29 AM.      Final      1.  Skull base fractures: Clivus, LEFT occipital bone and RIGHT petrous apex extending to the carotid canal, consider further assessment with CTA   2.  Facial fractures: LEFT lateral orbital wall, LEFT medial orbital wall, LEFT orbital floor, LEFT orbital rim, LEFT frontal bone, LEFT zygomatic arch, posterior and anterior wall LEFT maxillary sinus      Findings were discussed with RYANNE WAKEFIELD on 6/4/2019 10:52 AM.      CT-LSPINE W/O PLUS RECONS   Final Result      Mild degenerative changes as above described.         CT-TSPINE W/O PLUS RECONS   Final Result      Degenerative changes as above described.         CT-CHEST,ABDOMEN,PELVIS WITH   Final Result      No solid organ or vascular injury is identified.      No pneumothorax is seen.      Dependent left upper lobe and left lower lobe opacities may represent atelectasis/aspiration/contusion.      Mild atelectasis is seen dependently on the right.      Patchy groundglass opacities bilaterally may represent edema, contusion, aspiration. Ill-defined nodular opacities are seen in the left upper lobe and lingula. Small nodules are seen in the right upper lobe and right middle lobe. Follow-up is     recommended.      Enteric tube projects at the level of the gastroesophageal junction. Recommend advancement.         CT-HEAD W/O   Final Result      1.  LEFT frontal parenchymal contusion   2.  Supra and infratentorial subarachnoid hemorrhage   3.  LEFT frontal bone fracture extending through the anterior and posterior walls of the frontal sinus   4.  Pneumocephalus   5.  Skull base fracture involving the clivus and RIGHT petrous apex. The fracture line extends to the carotid canal, consider further assessment with CTA.   6.  LEFT lateral orbital wall, orbital rim, orbital floor and maxillary sinus fractures            Findings were discussed with RYANNE WAKEFIELD on 6/4/2019 10:52 AM.      CT-CSPINE WITHOUT PLUS RECONS   Final Result      Minimal loss of height of the superior endplate of C7 and T1 is of indeterminate age.      Fracture involving the right aspect of the clivus.      Degenerative changes as above described.      Please refer to report of intracranial findings on the dedicated head CT.      Patchy groundglass opacities at the lung apices may represent edema, aspiration, contusion.         DX-PELVIS-1 OR 2 VIEWS   Final Result      No fracture or dislocation is seen.         DX-CHEST-LIMITED (1 VIEW)   Final Result      Hazy opacity in the left midlung may represent a pulmonary contusion.      Endotracheal tube projects at the level of the clavicular heads.      Enteric tube projects over the distal esophagus near the gastroesophageal junction. Recommended advancement.         US-ABORTED US PROCEDURE    (Results Pending)   US-TRAUMA VEIN SCREEN LOWER BILAT EXTREMITY    (Results Pending)     All current laboratory studies/radiology exams reviewed: Yes    Completed Consultations:  Yes      Pending Consultations:  None    Newly Identified Diagnoses and Injuries:  None, a this time.

## 2019-06-05 NOTE — PROGRESS NOTES
Nurse  for [employer], Sissy Frias at bedside for update about patient. Patients mom, Jordyn, wanting to talk to the  and is okay with this RN updating .    RN aware.

## 2019-06-05 NOTE — PROGRESS NOTES
Cortrak Placement    Tube Team verified patient name and medical record number prior to tube placement.  Cortrak tube (55 inches, 10 Swiss) placed at 95 cm in right nare.  Per Cortrak picture, tube appears to be in the stomach.  Nursing Instructions: Awaiting KUB to confirm placement before use for medications or feeding. Once placement confirmed, flush tube with 30 ml of water, and then remove and save stylet, in patient medication drawer.
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
## 2019-06-05 NOTE — CARE PLAN
Problem: Pain Management  Goal: Pain level will decrease to patient's comfort goal    Intervention: Follow pain managment plan developed in collaboration with patient and Interdisciplinary Team  Pain assessed utilizing CPOT pain assessment tool. Pain medication given per MAR        Problem: Skin Integrity  Goal: Risk for impaired skin integrity will decrease    Intervention: Implement precautions to protect skin integrity in collaboration with the interdisciplinary team  Interventions currently in place to prevent skin breakdown include: Q2hour turning minimum, frequent skin assessments, keeping the body free of excess moisture. Pillows in use for pressure redistribution. Sacral mepilex applied.

## 2019-06-05 NOTE — DISCHARGE PLANNING
Anticipated Discharge Disposition: TBD    Action: RN CM spoke with patient's mother Krista Whiting at bedside.  Krista verified patient information; states he lives in a single story home in [place].  She reports patient was completely independent with ADLs and IADLs prior to this admission and has no financial barriers at this time.      Barriers to Discharge: Medical stabilization and clearance.    Plan: RN CM to follow for discharge planning needs.     Care Transition Team Assessment    Information Source  Orientation : Unable to Assess (Intubated)  Information Given By: Parent  Informant's Name: Krista Whiting  Who is responsible for making decisions for patient? : Legal next of kin  Name(s) of Primary Decision Maker: Krista Whiting    Readmission Evaluation  Is this a readmission?: No    Elopement Risk  Legal Hold: No  Ambulatory or Self Mobile in Wheelchair: No-Not an Elopement Risk  Elopement Risk: Not at Risk for Elopement    Interdisciplinary Discharge Planning  Lives with - Patient's Self Care Capacity: Alone and Able to Care For Self  Housing / Facility: 1 Story House  Mobility Issues: No  Prior Services: None  Assistance Needed: Unknown at this Time  Durable Medical Equipment: Not Applicable    Discharge Preparedness  What is your plan after discharge?: Uncertain - pending medical team collaboration  What are your discharge supports?: Partner, Parent  Prior Functional Level: Drives Self, Independent with Activities of Daily Living, Independent with Medication Management, Ambulatory  Difficulity with ADLs: None  Difficulity with IADLs: None    Functional Assesment  Prior Functional Level: Drives Self, Independent with Activities of Daily Living, Independent with Medication Management, Ambulatory    Finances  Financial Barriers to Discharge: No  Prescription Coverage: Yes                             Discharge Risks or Barriers  Discharge risks or barriers?: No PCP  Patient risk factors: No PCP    Anticipated  Discharge Information  Anticipated discharge disposition: Discharge needs currently unknown  Discharge Address: [address]  Discharge Contact Phone Number: [phone]
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
## 2019-06-05 NOTE — PROGRESS NOTES
2 RN skin check complete with CHARLES Nesbitt    Devices in use: ET tube, C-collar, EKG leads, SPO2 probe, BP cuff, chu, chu temp probe, SCDs, heel float boots    Skin assessed under all devices; ET tube repositioned q 2hrs    -Posterior head assessed; dried blood cleansed to best ability; no areas of concern for pressure injury  -Under C-collar assessed; no areas of concern at this time; new C-collar pads ordered by this RN  -Large head laceration to forehead and right parietal region of head; stapled; moderate to large amount of sanguinous drainage; cleansed with NS; covered in gauze  -Mepilex on sacrum; sacrum intact      Interventions in place:  Pt repositioned q 2hr with use of pillows to support repositioning  Heel float boots in place  ET tube repositioned q 2hr  Prophylactic mepilex in place over sacrum      Wound consult: No  Wound found: No  Appropriate LDAs open: Yes

## 2019-06-05 NOTE — CARE PLAN
Problem: Ventilation Defect:  Goal: Ability to achieve and maintain unassisted ventilation or tolerate decreased levels of ventilator support  Outcome: PROGRESSING AS EXPECTED  Adult Ventilation Update    Total Vent Days: 2    Patient Lines/Drains/Airways Status    Active Airway     Name: Placement date: Placement time: Site: Days:    Airway ETT Oral 8.0 06/04/19   1013   Oral   less than 1              Plateau Pressure (Q Shift): 17 (06/04/19 1851)  Static Compliance (ml / cm H2O): 68 (06/05/19 0521)    Patient failed trials because of    Barriers to SBT    Length of Weaning Trial      Cough: Productive (06/05/19 0400)  Sputum Amount: Small (06/05/19 0400)  Sputum Color: Brown;Bloody (06/05/19 0400)  Sputum Consistency: Thick (06/05/19 0400)    Mobility  Level of Mobility: Level IV (06/04/19 1200)  Activity Performed: Unable to mobilize (06/04/19 2000)  Reason Not Mobilized: Bed rest (new trauma; active bedrest orders) (06/04/19 2000)

## 2019-06-05 NOTE — PROGRESS NOTES
Family requesting that I page Dr. Durbin to update family  Spoke with MD and stated she will be by tonight, family informed. Continue bed rest orders per MD. MD aware of CTA results

## 2019-06-05 NOTE — PROGRESS NOTES
, Sissy, requesting drug screen/tox screen this RN informed her that no information or testing can be completed without a order or warrant. Gave ANABEL Jane  phone number to contact tomorrow with further questions

## 2019-06-05 NOTE — CARE PLAN
Problem: Ventilation Defect:  Goal: Ability to achieve and maintain unassisted ventilation or tolerate decreased levels of ventilator support    Intervention: Support and monitor invasive and noninvasive mechanical ventilation  Adult Ventilation Update    Total Vent Days: 2    Patient Lines/Drains/Airways Status    Active Airway     Name: Placement date: Placement time: Site: Days:    Airway ETT Oral 8.0 06/04/19   1013   Oral   1                (ASV) % MIN VOL: 110 (06/05/19 1430)  ASV Inspiratory Pressures-9  % Spontaneous 0    Sputum/Suction   Cough: Productive (06/05/19 1200)  Sputum Amount: Small (06/05/19 1200)  Sputum Color: Brown;Bloody (06/05/19 1200)  Sputum Consistency: Thick (06/05/19 1200)    Mobility  Level of Mobility: Level III (06/05/19 0800)  Activity Performed: Unable to mobilize (06/05/19 0800)  Reason Not Mobilized: Bed rest (06/05/19 0800)  Mobilization Comments: MD order (06/05/19 0800)    Events/Summary/Plan: Attempted spont multiple times thru the day-Apnea (no sedation)

## 2019-06-05 NOTE — PROGRESS NOTES
2 RN skin check complete with CHARLES Salomon.  Devices in place ET tube, SCD, Heel float boots, Cortrak, C-collar.   Skin assessed under the following aforementioned devices.   Preventative measures in place including Sacral mepilex.  Following areas of concern:   · Heels red/blanching  - Scattered bruising/abrasions to lower extremities  - Large head laceration, staples in place, open to air.      The following interventions in place:  - Q2hour turning minimum,  - Frequent skin assessments  - Keeping the body free of moisture  - Replaced the pads of C-collar  - Q2hour repositioning of ET tube.    Wound consult placedYES/NO: no    Wound reported YES/NO: N\A  Appropriate LDAs opened YES/NO: yes
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
## 2019-06-06 ENCOUNTER — APPOINTMENT (OUTPATIENT)
Dept: RADIOLOGY | Facility: MEDICAL CENTER | Age: 44
DRG: 963 | End: 2019-06-06
Attending: SURGERY
Payer: MEDICAID

## 2019-06-06 PROBLEM — R13.12 OROPHARYNGEAL DYSPHAGIA: Status: ACTIVE | Noted: 2019-06-06

## 2019-06-06 LAB
ANION GAP SERPL CALC-SCNC: 9 MMOL/L (ref 0–11.9)
BUN SERPL-MCNC: 8 MG/DL (ref 8–22)
CALCIUM SERPL-MCNC: 8 MG/DL (ref 8.5–10.5)
CHLORIDE SERPL-SCNC: 112 MMOL/L (ref 96–112)
CO2 SERPL-SCNC: 20 MMOL/L (ref 20–33)
CREAT SERPL-MCNC: 0.83 MG/DL (ref 0.5–1.4)
CRP SERPL HS-MCNC: 8.41 MG/DL (ref 0–0.75)
GLUCOSE SERPL-MCNC: 148 MG/DL (ref 65–99)
MAGNESIUM SERPL-MCNC: 1.9 MG/DL (ref 1.5–2.5)
PHOSPHATE SERPL-MCNC: 1.5 MG/DL (ref 2.5–4.5)
POTASSIUM SERPL-SCNC: 3.7 MMOL/L (ref 3.6–5.5)
PREALB SERPL-MCNC: 13 MG/DL (ref 18–38)
SODIUM SERPL-SCNC: 141 MMOL/L (ref 135–145)
TRIGL SERPL-MCNC: 127 MG/DL (ref 0–149)

## 2019-06-06 PROCEDURE — 94003 VENT MGMT INPAT SUBQ DAY: CPT

## 2019-06-06 PROCEDURE — 84134 ASSAY OF PREALBUMIN: CPT

## 2019-06-06 PROCEDURE — 84478 ASSAY OF TRIGLYCERIDES: CPT

## 2019-06-06 PROCEDURE — 83735 ASSAY OF MAGNESIUM: CPT

## 2019-06-06 PROCEDURE — A9270 NON-COVERED ITEM OR SERVICE: HCPCS | Performed by: SURGERY

## 2019-06-06 PROCEDURE — 84100 ASSAY OF PHOSPHORUS: CPT

## 2019-06-06 PROCEDURE — 700112 HCHG RX REV CODE 229: Performed by: SURGERY

## 2019-06-06 PROCEDURE — 700102 HCHG RX REV CODE 250 W/ 637 OVERRIDE(OP): Performed by: SURGERY

## 2019-06-06 PROCEDURE — 94150 VITAL CAPACITY TEST: CPT

## 2019-06-06 PROCEDURE — 71045 X-RAY EXAM CHEST 1 VIEW: CPT

## 2019-06-06 PROCEDURE — 700111 HCHG RX REV CODE 636 W/ 250 OVERRIDE (IP): Performed by: SURGERY

## 2019-06-06 PROCEDURE — 80048 BASIC METABOLIC PNL TOTAL CA: CPT

## 2019-06-06 PROCEDURE — 86140 C-REACTIVE PROTEIN: CPT

## 2019-06-06 PROCEDURE — 99291 CRITICAL CARE FIRST HOUR: CPT | Performed by: SURGERY

## 2019-06-06 PROCEDURE — 700101 HCHG RX REV CODE 250: Performed by: SURGERY

## 2019-06-06 PROCEDURE — 770022 HCHG ROOM/CARE - ICU (200)

## 2019-06-06 RX ORDER — OXYCODONE HYDROCHLORIDE 5 MG/1
5 TABLET ORAL EVERY 4 HOURS PRN
Status: DISCONTINUED | OUTPATIENT
Start: 2019-06-06 | End: 2019-06-09

## 2019-06-06 RX ORDER — OXYCODONE HYDROCHLORIDE 10 MG/1
10 TABLET ORAL EVERY 4 HOURS PRN
Status: DISCONTINUED | OUTPATIENT
Start: 2019-06-06 | End: 2019-06-07

## 2019-06-06 RX ADMIN — POTASSIUM CHLORIDE AND SODIUM CHLORIDE: 900; 150 INJECTION, SOLUTION INTRAVENOUS at 21:19

## 2019-06-06 RX ADMIN — HYDROMORPHONE HYDROCHLORIDE 1 MG: 2 INJECTION, SOLUTION INTRAMUSCULAR; INTRAVENOUS; SUBCUTANEOUS at 02:31

## 2019-06-06 RX ADMIN — LEVETIRACETAM 500 MG: 500 TABLET ORAL at 05:53

## 2019-06-06 RX ADMIN — FAMOTIDINE 20 MG: 20 TABLET ORAL at 05:53

## 2019-06-06 RX ADMIN — POLYETHYLENE GLYCOL 3350 1 PACKET: 17 POWDER, FOR SOLUTION ORAL at 05:55

## 2019-06-06 RX ADMIN — POLYETHYLENE GLYCOL 3350 1 PACKET: 17 POWDER, FOR SOLUTION ORAL at 17:33

## 2019-06-06 RX ADMIN — DOCUSATE SODIUM 100 MG: 50 LIQUID ORAL at 05:53

## 2019-06-06 RX ADMIN — ONDANSETRON 4 MG: 2 INJECTION INTRAMUSCULAR; INTRAVENOUS at 14:08

## 2019-06-06 RX ADMIN — CIPROFLOXACIN 400 MG: 2 INJECTION, SOLUTION INTRAVENOUS at 17:33

## 2019-06-06 RX ADMIN — DOCUSATE SODIUM 100 MG: 50 LIQUID ORAL at 17:32

## 2019-06-06 RX ADMIN — FAMOTIDINE 20 MG: 20 TABLET ORAL at 17:32

## 2019-06-06 RX ADMIN — OXYCODONE HYDROCHLORIDE 10 MG: 10 TABLET ORAL at 20:51

## 2019-06-06 RX ADMIN — CIPROFLOXACIN 400 MG: 2 INJECTION, SOLUTION INTRAVENOUS at 05:53

## 2019-06-06 RX ADMIN — PROPOFOL 20 MCG/KG/MIN: 10 INJECTION, EMULSION INTRAVENOUS at 04:38

## 2019-06-06 RX ADMIN — HYDROMORPHONE HYDROCHLORIDE 1 MG: 2 INJECTION, SOLUTION INTRAMUSCULAR; INTRAVENOUS; SUBCUTANEOUS at 14:06

## 2019-06-06 RX ADMIN — LEVETIRACETAM 500 MG: 500 TABLET ORAL at 17:32

## 2019-06-06 ASSESSMENT — COGNITIVE AND FUNCTIONAL STATUS - GENERAL
STANDING UP FROM CHAIR USING ARMS: A LITTLE
DAILY ACTIVITIY SCORE: 18
DRESSING REGULAR UPPER BODY CLOTHING: A LITTLE
MOVING TO AND FROM BED TO CHAIR: A LITTLE
WALKING IN HOSPITAL ROOM: A LITTLE
SUGGESTED CMS G CODE MODIFIER DAILY ACTIVITY: CK
MOBILITY SCORE: 18
TURNING FROM BACK TO SIDE WHILE IN FLAT BAD: A LITTLE
PERSONAL GROOMING: A LITTLE
DRESSING REGULAR LOWER BODY CLOTHING: A LITTLE
HELP NEEDED FOR BATHING: A LITTLE
TOILETING: A LITTLE
CLIMB 3 TO 5 STEPS WITH RAILING: A LITTLE
MOVING FROM LYING ON BACK TO SITTING ON SIDE OF FLAT BED: A LITTLE
SUGGESTED CMS G CODE MODIFIER MOBILITY: CK
EATING MEALS: A LITTLE

## 2019-06-06 ASSESSMENT — LIFESTYLE VARIABLES
EVER_SMOKED: YES
ALCOHOL_USE: YES
HAVE PEOPLE ANNOYED YOU BY CRITICIZING YOUR DRINKING: NO
TOTAL SCORE: 0
AVERAGE NUMBER OF DAYS PER WEEK YOU HAVE A DRINK CONTAINING ALCOHOL: 2
HOW MANY TIMES IN THE PAST YEAR HAVE YOU HAD 5 OR MORE DRINKS IN A DAY: 0
HAVE YOU EVER FELT YOU SHOULD CUT DOWN ON YOUR DRINKING: NO
TOTAL SCORE: 0
ON A TYPICAL DAY WHEN YOU DRINK ALCOHOL HOW MANY DRINKS DO YOU HAVE: 2
EVER HAD A DRINK FIRST THING IN THE MORNING TO STEADY YOUR NERVES TO GET RID OF A HANGOVER: NO
EVER FELT BAD OR GUILTY ABOUT YOUR DRINKING: NO
CONSUMPTION TOTAL: NEGATIVE
TOTAL SCORE: 0

## 2019-06-06 ASSESSMENT — PULMONARY FUNCTION TESTS: FVC: 1.8

## 2019-06-06 ASSESSMENT — COPD QUESTIONNAIRES
DURING THE PAST 4 WEEKS HOW MUCH DID YOU FEEL SHORT OF BREATH: NONE/LITTLE OF THE TIME
HAVE YOU SMOKED AT LEAST 100 CIGARETTES IN YOUR ENTIRE LIFE: YES
DO YOU EVER COUGH UP ANY MUCUS OR PHLEGM?: YES, A FEW DAYS A WEEK OR MONTH
COPD SCREENING SCORE: 3

## 2019-06-06 NOTE — CARE PLAN
"Problem: Safety  Goal: Will remain free from falls  Outcome: PROGRESSING AS EXPECTED  Patient bed in low, locked position with side rails up x2. Patient educated to call for assistance to get out of bed patient not using call bell appropriately but is not impulsive. Frequently reorienting patient. Rounding hourly to assess needs. Bed alarm in use.     Problem: Pain Management  Goal: Pain level will decrease to patient's comfort goal  Outcome: PROGRESSING AS EXPECTED  Assessing pain q2hrs and as needed. Patient shakes head \"no\" when asked if he is in pain. Patient scoring 0 on CPOT scale. Assisting patient to rest and reposition for comfort. PRN pain medication available if necessary.       "

## 2019-06-06 NOTE — CARE PLAN
Problem: Safety  Goal: Will remain free from falls    Intervention: Implement fall precautions  Interventions currently in place to prevent skin breakdown include: Q2hour turning minimum, frequent skin assessments, keeping the body free of excess moisture. Pillows in use for pressure redistribution. Sacral mepilex applied.         Problem: Pain Management  Goal: Pain level will decrease to patient's comfort goal    Intervention: Follow pain managment plan developed in collaboration with patient and Interdisciplinary Team  Pain assessed utilizing 0-10 Pain assessment tool. Pain medications given per MAR

## 2019-06-06 NOTE — PROGRESS NOTES
2 RN skin check complete with CHARLES Salomon.  Devices in place: SCD, Cortrak, silicone nasal cannula.              Skin assessed under the following aforementioned devices.              Preventative measures in place including Sacral mepilex.  Following areas of concern:   ·           Heels red/blanching  -           Scattered bruising/abrasions to lower extremities  -           Large head laceration, staples in place, open to air.      The following interventions in place:  -           Q2hour turning minimum,  -           Frequent skin assessments  -           Keeping the body free of moisture.     Wound consult placedYES/NO: no    Wound reported YES/NO: N\A  Appropriate LDAs opened YES/NO: yes

## 2019-06-06 NOTE — PROGRESS NOTES
2 RN skin check complete with CHARLES Magana.  Devices in place c-collar, SCDs, heel float boots, cortrak, EKG leads, BP cuff, O2 probe, soft wrist restraints, ETT and heron.   Skin assessed under the following devices: all those listed above.   Preventative measures in place including heel float boots, sacral mepilex, q2hr turns and repositioning of medical devices, pillows in use for support and positioning, changing c-collar pads qshift, and ensuring clean, dry sheets.  Following areas of concern:   -Heels red, blanching  -Scattered brusing/abrasions to lower extremities  -Large head laceration   The following interventions in place   -Heels red, blanching - heel float boots in place  -Scattered brusing/abrasions to lower extremities -PAKO    -Large head laceration - stapled - PAKO    Wound consult placedYES/NO: no    Wound reported YES/NO: no  Appropriate LDAs opened YES/NO: yes, already open
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
## 2019-06-06 NOTE — CARE PLAN
Problem: Ventilation Defect:  Goal: Ability to achieve and maintain unassisted ventilation or tolerate decreased levels of ventilator support    Intervention: Support and monitor invasive and noninvasive mechanical ventilation  Adult Ventilation Update    Total Vent Days: 3    Patient Lines/Drains/Airways Status    Active Airway     Name: Placement date: Placement time: Site: Days:    Airway ETT Oral 8.0 06/04/19   1013   Oral   3              In the last 24 hours, SBT Weaning Trials Stopped due to:: Apnea > 30 seconds X 4 (06/05/19 1430)    (ASV) % MIN VOL: 110 (06/06/19 0205)    Cough: Productive (06/06/19 0400)  Sputum Amount: Small (06/06/19 0400)  Sputum Color: Clear;Tan (06/06/19 0400)  Sputum Consistency: Thick;Thin (06/06/19 0400)    Mobility  Activity Performed: Unable to mobilize (06/05/19 2000)  Reason Not Mobilized: Bed rest (06/05/19 2000)

## 2019-06-06 NOTE — CARE PLAN
Problem: Nutritional:  Goal: Nutrition support tolerated and meeting greater than 85% of estimated needs  Outcome: MET Date Met: 06/06/19

## 2019-06-07 LAB
ANION GAP SERPL CALC-SCNC: 6 MMOL/L (ref 0–11.9)
BUN SERPL-MCNC: 12 MG/DL (ref 8–22)
CALCIUM SERPL-MCNC: 8.6 MG/DL (ref 8.5–10.5)
CHLORIDE SERPL-SCNC: 106 MMOL/L (ref 96–112)
CO2 SERPL-SCNC: 25 MMOL/L (ref 20–33)
CREAT SERPL-MCNC: 0.67 MG/DL (ref 0.5–1.4)
GLUCOSE SERPL-MCNC: 131 MG/DL (ref 65–99)
POTASSIUM SERPL-SCNC: 4 MMOL/L (ref 3.6–5.5)
SODIUM SERPL-SCNC: 137 MMOL/L (ref 135–145)

## 2019-06-07 PROCEDURE — A9270 NON-COVERED ITEM OR SERVICE: HCPCS | Performed by: SURGERY

## 2019-06-07 PROCEDURE — 700111 HCHG RX REV CODE 636 W/ 250 OVERRIDE (IP): Performed by: SURGERY

## 2019-06-07 PROCEDURE — 700101 HCHG RX REV CODE 250: Performed by: SURGERY

## 2019-06-07 PROCEDURE — 700112 HCHG RX REV CODE 229: Performed by: SURGERY

## 2019-06-07 PROCEDURE — 302136 NUTRITION PUMP: Performed by: SURGERY

## 2019-06-07 PROCEDURE — 700102 HCHG RX REV CODE 250 W/ 637 OVERRIDE(OP): Performed by: SURGERY

## 2019-06-07 PROCEDURE — 92610 EVALUATE SWALLOWING FUNCTION: CPT

## 2019-06-07 PROCEDURE — 99232 SBSQ HOSP IP/OBS MODERATE 35: CPT | Performed by: SURGERY

## 2019-06-07 PROCEDURE — 770006 HCHG ROOM/CARE - MED/SURG/GYN SEMI*

## 2019-06-07 PROCEDURE — 700111 HCHG RX REV CODE 636 W/ 250 OVERRIDE (IP): Performed by: NURSE PRACTITIONER

## 2019-06-07 PROCEDURE — 80048 BASIC METABOLIC PNL TOTAL CA: CPT

## 2019-06-07 RX ADMIN — CIPROFLOXACIN 400 MG: 2 INJECTION, SOLUTION INTRAVENOUS at 05:48

## 2019-06-07 RX ADMIN — CIPROFLOXACIN 400 MG: 2 INJECTION, SOLUTION INTRAVENOUS at 16:37

## 2019-06-07 RX ADMIN — LEVETIRACETAM 500 MG: 500 TABLET ORAL at 16:13

## 2019-06-07 RX ADMIN — OXYCODONE HYDROCHLORIDE 5 MG: 5 TABLET ORAL at 16:18

## 2019-06-07 RX ADMIN — FAMOTIDINE 20 MG: 20 TABLET ORAL at 05:48

## 2019-06-07 RX ADMIN — DOCUSATE SODIUM 100 MG: 50 LIQUID ORAL at 05:48

## 2019-06-07 RX ADMIN — LEVETIRACETAM 500 MG: 500 TABLET ORAL at 05:48

## 2019-06-07 RX ADMIN — OXYCODONE HYDROCHLORIDE 10 MG: 10 TABLET ORAL at 05:48

## 2019-06-07 RX ADMIN — POTASSIUM CHLORIDE AND SODIUM CHLORIDE: 900; 150 INJECTION, SOLUTION INTRAVENOUS at 16:36

## 2019-06-07 RX ADMIN — MAGNESIUM HYDROXIDE 30 ML: 400 SUSPENSION ORAL at 05:48

## 2019-06-07 RX ADMIN — ENOXAPARIN SODIUM 30 MG: 100 INJECTION SUBCUTANEOUS at 16:19

## 2019-06-07 RX ADMIN — OXYCODONE HYDROCHLORIDE 10 MG: 10 TABLET ORAL at 01:24

## 2019-06-07 RX ADMIN — POLYETHYLENE GLYCOL 3350 1 PACKET: 17 POWDER, FOR SOLUTION ORAL at 16:19

## 2019-06-07 RX ADMIN — DOCUSATE SODIUM 100 MG: 50 LIQUID ORAL at 16:19

## 2019-06-07 RX ADMIN — OXYCODONE HYDROCHLORIDE 5 MG: 5 TABLET ORAL at 11:58

## 2019-06-07 RX ADMIN — OXYCODONE HYDROCHLORIDE 5 MG: 5 TABLET ORAL at 20:23

## 2019-06-07 ASSESSMENT — ENCOUNTER SYMPTOMS
DOUBLE VISION: 0
HEADACHES: 1
SENSORY CHANGE: 0
FOCAL WEAKNESS: 0
NAUSEA: 0
CHILLS: 0
ABDOMINAL PAIN: 0
SPEECH CHANGE: 0
SHORTNESS OF BREATH: 0
FEVER: 0
BLURRED VISION: 0
VOMITING: 0
MYALGIAS: 1
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
## 2019-06-07 NOTE — ASSESSMENT & PLAN NOTE
Difficulty swallowing after extubation.  6/7 Failed swallow evaluation. Continue Cortrak.  6/8 D3.thin liquid diet initiated.  SLP following.
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
## 2019-06-07 NOTE — CARE PLAN
Problem: Safety  Goal: Will remain free from injury  Outcome: PROGRESSING AS EXPECTED  Preventative measures in place. Educated on the importance of calling for help before getting out of bed. Bed in lowest position, locked, and bed alarm on.

## 2019-06-07 NOTE — CARE PLAN
Problem: Communication  Goal: The ability to communicate needs accurately and effectively will improve  Outcome: PROGRESSING AS EXPECTED  Pt able to voice feelings and needs. Patient oriented. Education provided on pt's environment and the need to call for help.

## 2019-06-07 NOTE — CARE PLAN
Problem: Safety  Goal: Will remain free from falls    Intervention: Implement fall precautions  Bed in lowest/locked position, call light within reach, bedside table within reach, bed alarm set appropriately. Patient instructed to call staff prior to attempting ambulation. Patient verbalizes understanding of RN instruction.         Problem: Pain Management  Goal: Pain level will decrease to patient's comfort goal    Intervention: Follow pain managment plan developed in collaboration with patient and Interdisciplinary Team  Pain assessed utilizing 0-10 Pain assessment tool. Pain medications given per MAR

## 2019-06-08 LAB
ANION GAP SERPL CALC-SCNC: 8 MMOL/L (ref 0–11.9)
BASOPHILS # BLD AUTO: 0.2 % (ref 0–1.8)
BASOPHILS # BLD: 0.02 K/UL (ref 0–0.12)
BUN SERPL-MCNC: 14 MG/DL (ref 8–22)
CALCIUM SERPL-MCNC: 9.2 MG/DL (ref 8.5–10.5)
CHLORIDE SERPL-SCNC: 101 MMOL/L (ref 96–112)
CO2 SERPL-SCNC: 28 MMOL/L (ref 20–33)
CREAT SERPL-MCNC: 0.73 MG/DL (ref 0.5–1.4)
CRP SERPL HS-MCNC: 5.24 MG/DL (ref 0–0.75)
EOSINOPHIL # BLD AUTO: 0.11 K/UL (ref 0–0.51)
EOSINOPHIL NFR BLD: 1 % (ref 0–6.9)
ERYTHROCYTE [DISTWIDTH] IN BLOOD BY AUTOMATED COUNT: 46.2 FL (ref 35.9–50)
GLUCOSE SERPL-MCNC: 139 MG/DL (ref 65–99)
HCT VFR BLD AUTO: 37.6 % (ref 42–52)
HGB BLD-MCNC: 12.2 G/DL (ref 14–18)
IMM GRANULOCYTES # BLD AUTO: 0.04 K/UL (ref 0–0.11)
IMM GRANULOCYTES NFR BLD AUTO: 0.4 % (ref 0–0.9)
LYMPHOCYTES # BLD AUTO: 1.43 K/UL (ref 1–4.8)
LYMPHOCYTES NFR BLD: 13 % (ref 22–41)
MCH RBC QN AUTO: 30.8 PG (ref 27–33)
MCHC RBC AUTO-ENTMCNC: 32.4 G/DL (ref 33.7–35.3)
MCV RBC AUTO: 94.9 FL (ref 81.4–97.8)
MONOCYTES # BLD AUTO: 0.82 K/UL (ref 0–0.85)
MONOCYTES NFR BLD AUTO: 7.5 % (ref 0–13.4)
NEUTROPHILS # BLD AUTO: 8.56 K/UL (ref 1.82–7.42)
NEUTROPHILS NFR BLD: 77.9 % (ref 44–72)
NRBC # BLD AUTO: 0 K/UL
NRBC BLD-RTO: 0 /100 WBC
PLATELET # BLD AUTO: 259 K/UL (ref 164–446)
PMV BLD AUTO: 10.4 FL (ref 9–12.9)
POTASSIUM SERPL-SCNC: 4.2 MMOL/L (ref 3.6–5.5)
PREALB SERPL-MCNC: 16 MG/DL (ref 18–38)
RBC # BLD AUTO: 3.96 M/UL (ref 4.7–6.1)
SODIUM SERPL-SCNC: 137 MMOL/L (ref 135–145)
WBC # BLD AUTO: 11 K/UL (ref 4.8–10.8)

## 2019-06-08 PROCEDURE — A9270 NON-COVERED ITEM OR SERVICE: HCPCS | Performed by: SURGERY

## 2019-06-08 PROCEDURE — 84134 ASSAY OF PREALBUMIN: CPT

## 2019-06-08 PROCEDURE — 770006 HCHG ROOM/CARE - MED/SURG/GYN SEMI*

## 2019-06-08 PROCEDURE — 36415 COLL VENOUS BLD VENIPUNCTURE: CPT

## 2019-06-08 PROCEDURE — 80048 BASIC METABOLIC PNL TOTAL CA: CPT

## 2019-06-08 PROCEDURE — 700111 HCHG RX REV CODE 636 W/ 250 OVERRIDE (IP): Performed by: NURSE PRACTITIONER

## 2019-06-08 PROCEDURE — 86140 C-REACTIVE PROTEIN: CPT

## 2019-06-08 PROCEDURE — 85025 COMPLETE CBC W/AUTO DIFF WBC: CPT

## 2019-06-08 PROCEDURE — 700111 HCHG RX REV CODE 636 W/ 250 OVERRIDE (IP): Performed by: SURGERY

## 2019-06-08 PROCEDURE — 700112 HCHG RX REV CODE 229: Performed by: SURGERY

## 2019-06-08 PROCEDURE — 92526 ORAL FUNCTION THERAPY: CPT

## 2019-06-08 PROCEDURE — 700102 HCHG RX REV CODE 250 W/ 637 OVERRIDE(OP): Performed by: SURGERY

## 2019-06-08 RX ADMIN — ENOXAPARIN SODIUM 30 MG: 100 INJECTION SUBCUTANEOUS at 18:00

## 2019-06-08 RX ADMIN — OXYCODONE HYDROCHLORIDE 5 MG: 5 TABLET ORAL at 13:33

## 2019-06-08 RX ADMIN — OXYCODONE HYDROCHLORIDE 5 MG: 5 TABLET ORAL at 18:03

## 2019-06-08 RX ADMIN — OXYCODONE HYDROCHLORIDE 5 MG: 5 TABLET ORAL at 04:30

## 2019-06-08 RX ADMIN — DOCUSATE SODIUM 100 MG: 50 LIQUID ORAL at 18:03

## 2019-06-08 RX ADMIN — ENOXAPARIN SODIUM 30 MG: 100 INJECTION SUBCUTANEOUS at 04:30

## 2019-06-08 RX ADMIN — POLYETHYLENE GLYCOL 3350 1 PACKET: 17 POWDER, FOR SOLUTION ORAL at 04:30

## 2019-06-08 RX ADMIN — LEVETIRACETAM 500 MG: 500 TABLET ORAL at 18:03

## 2019-06-08 RX ADMIN — LEVETIRACETAM 500 MG: 500 TABLET ORAL at 04:30

## 2019-06-08 RX ADMIN — DOCUSATE SODIUM 100 MG: 50 LIQUID ORAL at 04:30

## 2019-06-08 RX ADMIN — CIPROFLOXACIN 400 MG: 2 INJECTION, SOLUTION INTRAVENOUS at 18:02

## 2019-06-08 RX ADMIN — OXYCODONE HYDROCHLORIDE 5 MG: 5 TABLET ORAL at 21:59

## 2019-06-08 RX ADMIN — MAGNESIUM HYDROXIDE 30 ML: 400 SUSPENSION ORAL at 04:30

## 2019-06-08 RX ADMIN — CIPROFLOXACIN 400 MG: 2 INJECTION, SOLUTION INTRAVENOUS at 04:45

## 2019-06-08 RX ADMIN — OXYCODONE HYDROCHLORIDE 5 MG: 5 TABLET ORAL at 00:38

## 2019-06-08 ASSESSMENT — ENCOUNTER SYMPTOMS
NAUSEA: 0
HEADACHES: 1
MYALGIAS: 1
DOUBLE VISION: 0
CHILLS: 0
SENSORY CHANGE: 0
FEVER: 0
ABDOMINAL PAIN: 0
FOCAL WEAKNESS: 0
SPEECH CHANGE: 0
BLURRED VISION: 0
SHORTNESS OF BREATH: 0
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
## 2019-06-08 NOTE — PROGRESS NOTES
Lethargic, oriented x4, generally weak. Ambulates with slow, steady gait and standby assist. Medicated twice this shift so far for pain and showered with partial assist from girlfriend. Skin to head and face bloody, but cleansed as angel only limited by pain due to traumatic facial bone fractures and facial skin lacerations secured by staples. Otherwise stable.

## 2019-06-08 NOTE — CARE PLAN
Problem: Safety  Goal: Will remain free from falls  Outcome: PROGRESSING AS EXPECTED  Treaded socks in place. Bed alarm on. Bed locked and in lowest position. Call light within reach.     Problem: Pain Management  Goal: Pain level will decrease to patient's comfort goal  Outcome: PROGRESSING AS EXPECTED  PRN medication in use.

## 2019-06-08 NOTE — PROGRESS NOTES
Patient arrived on unit approximately this time. Vital signs taken, stable, bradycardia that appears to be patient's baseline trend the past few days. Patient and family oriented to new unit surroundings, bed alarm system, call light system, and fall precautions. Patient educated on fall risk and to utilize call light if he needs any assistance. Patient c/o 10/10 pain in anterior and left lateral head, medicated with PRN oxycodone per orders, see MAR. Patient on 1L of oxygen via NC at this time with O2 saturations >90%. Administered evening medications per orders, see MAR and education provided regarding medications. Assessment complete. Patient requesting to rest at this time. Bed alarm on, bed locked and in low position, call light within reach. SCDs in use, IVF running Cipro piggyback then NS with 20KCl at 75 cc/hr, at and TF running Impact peptide at goal of 55cc/hr. HOB elevated >30 degrees. POC discussed and all questions and concerns addressed at this time.

## 2019-06-08 NOTE — PROGRESS NOTES
Assumed care of pt at 0700.   Pt is A&Ox4.   Pt denies n/v and n/t at this time.   Pt complains of headache, PRN medication in use.   Coretrak in place in R nare running Impact Peptide at 55ml/hr, which is goal.   Pt is standby assist to bathroom.   Plan of care discussed.   Hourly rounding in place.     Feedings held at 1330 per APN for new diet trials.
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
## 2019-06-08 NOTE — CARE PLAN
Problem: Venous Thromboembolism (VTW)/Deep Vein Thrombosis (DVT) Prevention:  Goal: Patient will participate in Venous Thrombosis (VTE)/Deep Vein Thrombosis (DVT)Prevention Measures    Intervention: Encourage ambulation/mobilization at level directed by Physical Therapy in collaboration with Interdisciplinary Team  Patient has mobilized to bathroom twice this shift so far for voids x2 and a shower. Tolerated well and only requiring standby assist to ambulate and assist from girlfriend in the shower.      Problem: Skin Integrity  Goal: Risk for impaired skin integrity will decrease    Intervention: Implement precautions to protect skin integrity in collaboration with the interdisciplinary team  Patient showered this shift and was able to cleanse face skin of old blood.

## 2019-06-09 LAB
ANION GAP SERPL CALC-SCNC: 8 MMOL/L (ref 0–11.9)
BUN SERPL-MCNC: 14 MG/DL (ref 8–22)
CALCIUM SERPL-MCNC: 9.6 MG/DL (ref 8.5–10.5)
CHLORIDE SERPL-SCNC: 98 MMOL/L (ref 96–112)
CO2 SERPL-SCNC: 30 MMOL/L (ref 20–33)
CREAT SERPL-MCNC: 0.76 MG/DL (ref 0.5–1.4)
GLUCOSE SERPL-MCNC: 117 MG/DL (ref 65–99)
POTASSIUM SERPL-SCNC: 4.1 MMOL/L (ref 3.6–5.5)
SODIUM SERPL-SCNC: 136 MMOL/L (ref 135–145)

## 2019-06-09 PROCEDURE — 700111 HCHG RX REV CODE 636 W/ 250 OVERRIDE (IP): Performed by: SURGERY

## 2019-06-09 PROCEDURE — 92523 SPEECH SOUND LANG COMPREHEN: CPT

## 2019-06-09 PROCEDURE — 97165 OT EVAL LOW COMPLEX 30 MIN: CPT

## 2019-06-09 PROCEDURE — 97161 PT EVAL LOW COMPLEX 20 MIN: CPT

## 2019-06-09 PROCEDURE — 700102 HCHG RX REV CODE 250 W/ 637 OVERRIDE(OP): Performed by: SURGERY

## 2019-06-09 PROCEDURE — 770006 HCHG ROOM/CARE - MED/SURG/GYN SEMI*

## 2019-06-09 PROCEDURE — 700112 HCHG RX REV CODE 229: Performed by: SURGERY

## 2019-06-09 PROCEDURE — 700102 HCHG RX REV CODE 250 W/ 637 OVERRIDE(OP): Performed by: NURSE PRACTITIONER

## 2019-06-09 PROCEDURE — 80048 BASIC METABOLIC PNL TOTAL CA: CPT

## 2019-06-09 PROCEDURE — A9270 NON-COVERED ITEM OR SERVICE: HCPCS | Performed by: SURGERY

## 2019-06-09 PROCEDURE — 700111 HCHG RX REV CODE 636 W/ 250 OVERRIDE (IP): Performed by: NURSE PRACTITIONER

## 2019-06-09 PROCEDURE — A9270 NON-COVERED ITEM OR SERVICE: HCPCS | Performed by: NURSE PRACTITIONER

## 2019-06-09 PROCEDURE — 36415 COLL VENOUS BLD VENIPUNCTURE: CPT

## 2019-06-09 RX ORDER — ACETAMINOPHEN 500 MG
1000 TABLET ORAL EVERY 4 HOURS PRN
Status: DISCONTINUED | OUTPATIENT
Start: 2019-06-09 | End: 2019-06-10

## 2019-06-09 RX ORDER — POLYETHYLENE GLYCOL 3350 17 G/17G
1 POWDER, FOR SOLUTION ORAL 2 TIMES DAILY
Status: DISCONTINUED | OUTPATIENT
Start: 2019-06-09 | End: 2019-06-11 | Stop reason: HOSPADM

## 2019-06-09 RX ORDER — CIPROFLOXACIN 500 MG/1
500 TABLET, FILM COATED ORAL EVERY 12 HOURS
Status: COMPLETED | OUTPATIENT
Start: 2019-06-09 | End: 2019-06-10

## 2019-06-09 RX ORDER — AMOXICILLIN 250 MG
1 CAPSULE ORAL NIGHTLY
Status: DISCONTINUED | OUTPATIENT
Start: 2019-06-09 | End: 2019-06-11 | Stop reason: HOSPADM

## 2019-06-09 RX ORDER — OXYCODONE HYDROCHLORIDE 5 MG/1
5 TABLET ORAL EVERY 4 HOURS PRN
Status: DISCONTINUED | OUTPATIENT
Start: 2019-06-09 | End: 2019-06-11

## 2019-06-09 RX ORDER — LEVETIRACETAM 500 MG/1
500 TABLET ORAL 2 TIMES DAILY
Status: COMPLETED | OUTPATIENT
Start: 2019-06-09 | End: 2019-06-10

## 2019-06-09 RX ORDER — AMOXICILLIN 250 MG
1 CAPSULE ORAL
Status: DISCONTINUED | OUTPATIENT
Start: 2019-06-09 | End: 2019-06-11 | Stop reason: HOSPADM

## 2019-06-09 RX ORDER — DOCUSATE SODIUM 100 MG/1
100 CAPSULE, LIQUID FILLED ORAL 2 TIMES DAILY
Status: DISCONTINUED | OUTPATIENT
Start: 2019-06-09 | End: 2019-06-11 | Stop reason: HOSPADM

## 2019-06-09 RX ADMIN — CIPROFLOXACIN 400 MG: 2 INJECTION, SOLUTION INTRAVENOUS at 04:37

## 2019-06-09 RX ADMIN — MAGNESIUM HYDROXIDE 30 ML: 400 SUSPENSION ORAL at 04:37

## 2019-06-09 RX ADMIN — OXYCODONE HYDROCHLORIDE 5 MG: 5 TABLET ORAL at 08:55

## 2019-06-09 RX ADMIN — POLYETHYLENE GLYCOL 3350 1 PACKET: 17 POWDER, FOR SOLUTION ORAL at 17:29

## 2019-06-09 RX ADMIN — OXYCODONE HYDROCHLORIDE 5 MG: 5 TABLET ORAL at 18:35

## 2019-06-09 RX ADMIN — DOCUSATE SODIUM 100 MG: 50 LIQUID ORAL at 04:37

## 2019-06-09 RX ADMIN — POLYETHYLENE GLYCOL 3350 1 PACKET: 17 POWDER, FOR SOLUTION ORAL at 04:37

## 2019-06-09 RX ADMIN — ACETAMINOPHEN 1000 MG: 500 TABLET ORAL at 17:28

## 2019-06-09 RX ADMIN — DOCUSATE SODIUM 100 MG: 100 CAPSULE, LIQUID FILLED ORAL at 17:30

## 2019-06-09 RX ADMIN — LEVETIRACETAM 500 MG: 500 TABLET ORAL at 17:28

## 2019-06-09 RX ADMIN — OXYCODONE HYDROCHLORIDE 5 MG: 5 TABLET ORAL at 02:39

## 2019-06-09 RX ADMIN — ACETAMINOPHEN 1000 MG: 500 TABLET ORAL at 21:35

## 2019-06-09 RX ADMIN — LEVETIRACETAM 500 MG: 500 TABLET ORAL at 04:37

## 2019-06-09 RX ADMIN — ENOXAPARIN SODIUM 30 MG: 100 INJECTION SUBCUTANEOUS at 17:30

## 2019-06-09 RX ADMIN — ACETAMINOPHEN 1000 MG: 500 TABLET ORAL at 13:02

## 2019-06-09 RX ADMIN — ENOXAPARIN SODIUM 30 MG: 100 INJECTION SUBCUTANEOUS at 04:37

## 2019-06-09 RX ADMIN — CIPROFLOXACIN HYDROCHLORIDE 500 MG: 500 TABLET, FILM COATED ORAL at 17:29

## 2019-06-09 ASSESSMENT — COGNITIVE AND FUNCTIONAL STATUS - GENERAL
WALKING IN HOSPITAL ROOM: A LITTLE
STANDING UP FROM CHAIR USING ARMS: A LITTLE
DRESSING REGULAR LOWER BODY CLOTHING: A LITTLE
MOVING FROM LYING ON BACK TO SITTING ON SIDE OF FLAT BED: A LITTLE
HELP NEEDED FOR BATHING: A LITTLE
SUGGESTED CMS G CODE MODIFIER DAILY ACTIVITY: CK
TURNING FROM BACK TO SIDE WHILE IN FLAT BAD: A LITTLE
DRESSING REGULAR UPPER BODY CLOTHING: A LITTLE
EATING MEALS: A LITTLE
DAILY ACTIVITIY SCORE: 18
CLIMB 3 TO 5 STEPS WITH RAILING: A LITTLE
PERSONAL GROOMING: A LITTLE
MOBILITY SCORE: 18
TOILETING: A LITTLE
SUGGESTED CMS G CODE MODIFIER MOBILITY: CK
MOVING TO AND FROM BED TO CHAIR: A LITTLE

## 2019-06-09 ASSESSMENT — GAIT ASSESSMENTS
DEVIATION: BRADYKINETIC;SHUFFLED GAIT
DISTANCE (FEET): 75
GAIT LEVEL OF ASSIST: MINIMAL ASSIST

## 2019-06-09 ASSESSMENT — ENCOUNTER SYMPTOMS
MYALGIAS: 1
HEADACHES: 1
RESPIRATORY NEGATIVE: 1
ROS GI COMMENTS: BM 6/8

## 2019-06-09 ASSESSMENT — PATIENT HEALTH QUESTIONNAIRE - PHQ9
2. FEELING DOWN, DEPRESSED, IRRITABLE, OR HOPELESS: NOT AT ALL
1. LITTLE INTEREST OR PLEASURE IN DOING THINGS: NOT AT ALL
SUM OF ALL RESPONSES TO PHQ9 QUESTIONS 1 AND 2: 0

## 2019-06-09 ASSESSMENT — ACTIVITIES OF DAILY LIVING (ADL): TOILETING: INDEPENDENT

## 2019-06-09 NOTE — PROGRESS NOTES
Received report from CHARLES Roe. Assumed patient care at 0655. Pt A&Ox4, somewhat lethargic/fatigued, easily aroused. Pt denies chest pain, SOB, blurry or double vision, n/v, n/t. Pt c/o head pain, medicated per PRN orders, see MAR. Patient tolerated breakfast approximately 50% of meal.   POC discussed and all questions and concerns addressed during morning rounds with Keron Pereira. Per BRADLY Pereira okay to remove Cortrak if 50% of lunch meal consumed. Removed Cortrak once patient consumed at least 50% of lunch per nursing communication order. Patient's family and OT stating that he seems more fatigued today, per OT may be better to see if we can wean him off of the oxycodone more. Called BRADLY Pereira to see what other pain medication options the patient could have. Per BRADLY Pereira placed order for PRN Tylenol 1000 mg Q4hrs. Patient agreeable to trying Tylenol with oxycodone still available PRN if needed. Patient ambulates SBA, patient trained on how to assist as they would like to be involved in patient care. Education provided regarding medications, use of call light for assistance, and POC. Patient and family verbalize understanding. Fall precautions, hourly rounding, and Q4 hour neuro checks in place. Bed locked and in low position, call light and belongings in reach. Girlfriend at bedside at this time.

## 2019-06-09 NOTE — CARE PLAN
Problem: Safety  Goal: Will remain free from injury    Intervention: Provide assistance with mobility  Provide assistance to bathroom. Taught pt and girlfriend to use call light and to not get out of bed without assistance.

## 2019-06-09 NOTE — CARE PLAN
Problem: Pain Management  Goal: Pain level will decrease to patient's comfort goal    Intervention: Follow pain managment plan developed in collaboration with patient and Interdisciplinary Team  Educated pt on managing pain before it becomes severe. Will assess pain every four hours.
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
## 2019-06-10 PROBLEM — Z02.9 DISCHARGE PLANNING ISSUES: Status: ACTIVE | Noted: 2019-06-10

## 2019-06-10 PROBLEM — S27.322A BILATERAL PULMONARY CONTUSION: Status: RESOLVED | Noted: 2019-06-04 | Resolved: 2019-06-10

## 2019-06-10 PROBLEM — Z75.8 DISCHARGE PLANNING ISSUES: Status: ACTIVE | Noted: 2019-06-10

## 2019-06-10 PROBLEM — J96.90 RESPIRATORY FAILURE FOLLOWING TRAUMA (HCC): Status: RESOLVED | Noted: 2019-06-04 | Resolved: 2019-06-10

## 2019-06-10 PROCEDURE — 97535 SELF CARE MNGMENT TRAINING: CPT

## 2019-06-10 PROCEDURE — 97116 GAIT TRAINING THERAPY: CPT

## 2019-06-10 PROCEDURE — 700102 HCHG RX REV CODE 250 W/ 637 OVERRIDE(OP): Performed by: SURGERY

## 2019-06-10 PROCEDURE — 97112 NEUROMUSCULAR REEDUCATION: CPT

## 2019-06-10 PROCEDURE — 770006 HCHG ROOM/CARE - MED/SURG/GYN SEMI*

## 2019-06-10 PROCEDURE — A9270 NON-COVERED ITEM OR SERVICE: HCPCS | Performed by: SURGERY

## 2019-06-10 PROCEDURE — A9270 NON-COVERED ITEM OR SERVICE: HCPCS | Performed by: NURSE PRACTITIONER

## 2019-06-10 PROCEDURE — 700111 HCHG RX REV CODE 636 W/ 250 OVERRIDE (IP): Performed by: NURSE PRACTITIONER

## 2019-06-10 PROCEDURE — 700112 HCHG RX REV CODE 229: Performed by: SURGERY

## 2019-06-10 PROCEDURE — 700102 HCHG RX REV CODE 250 W/ 637 OVERRIDE(OP): Performed by: NURSE PRACTITIONER

## 2019-06-10 PROCEDURE — 99358 PROLONG SERVICE W/O CONTACT: CPT | Performed by: PHYSICAL MEDICINE & REHABILITATION

## 2019-06-10 RX ORDER — ACETAMINOPHEN 500 MG
1000 TABLET ORAL EVERY 6 HOURS PRN
Status: DISCONTINUED | OUTPATIENT
Start: 2019-06-10 | End: 2019-06-11 | Stop reason: HOSPADM

## 2019-06-10 RX ORDER — BUTALBITAL, ACETAMINOPHEN AND CAFFEINE 50; 325; 40 MG/1; MG/1; MG/1
2 TABLET ORAL EVERY 6 HOURS PRN
Status: DISCONTINUED | OUTPATIENT
Start: 2019-06-10 | End: 2019-06-11 | Stop reason: HOSPADM

## 2019-06-10 RX ORDER — ONDANSETRON 4 MG/1
4 TABLET, ORALLY DISINTEGRATING ORAL EVERY 4 HOURS PRN
Status: DISCONTINUED | OUTPATIENT
Start: 2019-06-10 | End: 2019-06-11 | Stop reason: HOSPADM

## 2019-06-10 RX ADMIN — CIPROFLOXACIN HYDROCHLORIDE 500 MG: 500 TABLET, FILM COATED ORAL at 16:34

## 2019-06-10 RX ADMIN — DOCUSATE SODIUM 100 MG: 100 CAPSULE, LIQUID FILLED ORAL at 16:34

## 2019-06-10 RX ADMIN — ENOXAPARIN SODIUM 30 MG: 100 INJECTION SUBCUTANEOUS at 16:35

## 2019-06-10 RX ADMIN — DOCUSATE SODIUM 100 MG: 100 CAPSULE, LIQUID FILLED ORAL at 06:17

## 2019-06-10 RX ADMIN — ENOXAPARIN SODIUM 30 MG: 100 INJECTION SUBCUTANEOUS at 06:17

## 2019-06-10 RX ADMIN — LEVETIRACETAM 500 MG: 500 TABLET ORAL at 06:17

## 2019-06-10 RX ADMIN — ACETAMINOPHEN 1000 MG: 500 TABLET ORAL at 08:31

## 2019-06-10 RX ADMIN — OXYCODONE HYDROCHLORIDE 5 MG: 5 TABLET ORAL at 11:40

## 2019-06-10 RX ADMIN — OXYCODONE HYDROCHLORIDE 5 MG: 5 TABLET ORAL at 06:17

## 2019-06-10 RX ADMIN — BUTALBITAL, ACETAMINOPHEN, AND CAFFEINE 2 TABLET: 50; 325; 40 TABLET ORAL at 16:34

## 2019-06-10 RX ADMIN — OXYCODONE HYDROCHLORIDE 5 MG: 5 TABLET ORAL at 20:04

## 2019-06-10 RX ADMIN — LEVETIRACETAM 500 MG: 500 TABLET ORAL at 16:34

## 2019-06-10 RX ADMIN — CIPROFLOXACIN HYDROCHLORIDE 500 MG: 500 TABLET, FILM COATED ORAL at 06:17

## 2019-06-10 RX ADMIN — SENNOSIDES,DOCUSATE SODIUM 1 TABLET: 8.6; 5 TABLET, FILM COATED ORAL at 21:37

## 2019-06-10 RX ADMIN — OXYCODONE HYDROCHLORIDE 5 MG: 5 TABLET ORAL at 02:29

## 2019-06-10 ASSESSMENT — COGNITIVE AND FUNCTIONAL STATUS - GENERAL
DAILY ACTIVITIY SCORE: 20
STANDING UP FROM CHAIR USING ARMS: A LITTLE
SUGGESTED CMS G CODE MODIFIER MOBILITY: CK
MOVING FROM LYING ON BACK TO SITTING ON SIDE OF FLAT BED: A LITTLE
PERSONAL GROOMING: A LITTLE
TOILETING: A LITTLE
WALKING IN HOSPITAL ROOM: A LITTLE
HELP NEEDED FOR BATHING: A LITTLE
MOVING TO AND FROM BED TO CHAIR: A LITTLE
MOBILITY SCORE: 19
DRESSING REGULAR LOWER BODY CLOTHING: A LITTLE
SUGGESTED CMS G CODE MODIFIER DAILY ACTIVITY: CJ
CLIMB 3 TO 5 STEPS WITH RAILING: A LITTLE

## 2019-06-10 ASSESSMENT — GAIT ASSESSMENTS
GAIT LEVEL OF ASSIST: SUPERVISED
DEVIATION: DECREASED HEEL STRIKE;DECREASED TOE OFF
DISTANCE (FEET): 500

## 2019-06-10 ASSESSMENT — PATIENT HEALTH QUESTIONNAIRE - PHQ9
SUM OF ALL RESPONSES TO PHQ9 QUESTIONS 1 AND 2: 0
1. LITTLE INTEREST OR PLEASURE IN DOING THINGS: NOT AT ALL
2. FEELING DOWN, DEPRESSED, IRRITABLE, OR HOPELESS: NOT AT ALL

## 2019-06-10 ASSESSMENT — ENCOUNTER SYMPTOMS
MYALGIAS: 1
ROS GI COMMENTS: BM 6/9
PHOTOPHOBIA: 1
RESPIRATORY NEGATIVE: 1

## 2019-06-10 NOTE — DISCHARGE PLANNING
TCC  Submitted to pt's workman's comp today.     Plan:  Admit to Rehab once insurance authorization obtain/ bed availabiliyt.
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
## 2019-06-10 NOTE — CARE PLAN
Problem: Safety  Goal: Will remain free from injury  Outcome: PROGRESSING AS EXPECTED  Patient has bed locked and in low position, call light and belongings in reach. Patient's gf and family members received education on safety and ambulation and have helped patient up to bathroom with SBA today, tolerated well. Patient turns self side to side in bed well.     Problem: Bowel/Gastric:  Goal: Normal bowel function is maintained or improved  Outcome: PROGRESSING SLOWER THAN EXPECTED  Large loose/watery BM this evening close to shift change, recommend holding bowel meds in the morning.     Problem: Pain Management  Goal: Pain level will decrease to patient's comfort goal  Outcome: PROGRESSING AS EXPECTED  Patient tolerated Tylenol today with pain decreasing to approximately a 3/10, however later this evening his pain increased and tylenol was not sufficient so PRN oxycodone provided per orders, see MAR.     Problem: Respiratory:  Goal: Respiratory status will improve  Outcome: PROGRESSING AS EXPECTED  Patient tolerated between 0 oxygen and 1L oxygen via NC throughout shift, tending to desaturate a little when he slept.

## 2019-06-10 NOTE — CARE PLAN
Problem: Safety  Goal: Will remain free from injury    Intervention: Provide assistance with mobility  Pt is up with one assist and a steady gait.       Problem: Pain Management  Goal: Pain level will decrease to patient's comfort goal  Outcome: PROGRESSING AS EXPECTED  Pt c/o headache  Discussed pain control with JEANINE Carlson ordered.

## 2019-06-10 NOTE — ASSESSMENT & PLAN NOTE
Date of admission: 6/4/2019  Date: 6/7 Transfer orders from SICU  Date: 6/10 Rehab/SNF consult   Cleared for discharge: Yes - Date: 6/11  Discharge delayed: No    Discharge date: 6/11

## 2019-06-10 NOTE — CARE PLAN
Problem: Nutritional:  Goal: Achieve adequate nutritional intake  Patient will consume >50% of meals  Outcome: PROGRESSING AS EXPECTED  PO intake 25-50% of most recent 3 meals.
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
## 2019-06-10 NOTE — PROGRESS NOTES
Received report from night nurse at the bedside. Assume care of Pt at 0700. Assessment completed Pt A&O X 4. Pt denies numbness and tingling, Pt c/o headache, will medicate per mar with tylenol and oxycodone as available, declining ice pack at this time.  Assist of one. Pt in bed, bed in lowest position, call light in place, bed/chair alarm is on, treaded slipper socks on.

## 2019-06-10 NOTE — CARE PLAN
Problem: Venous Thromboembolism (VTW)/Deep Vein Thrombosis (DVT) Prevention:  Goal: Patient will participate in Venous Thrombosis (VTE)/Deep Vein Thrombosis (DVT)Prevention Measures  Outcome: PROGRESSING AS EXPECTED  No S/S of DVT, patient receiving Lovenox.    Problem: Skin Integrity  Goal: Risk for impaired skin integrity will decrease  Outcome: PROGRESSING AS EXPECTED  No new skin issues, patient able to reposition self.

## 2019-06-11 ENCOUNTER — HOSPITAL ENCOUNTER (INPATIENT)
Facility: REHABILITATION | Age: 44
LOS: 10 days | DRG: 950 | End: 2019-06-21
Attending: PHYSICAL MEDICINE & REHABILITATION | Admitting: PHYSICAL MEDICINE & REHABILITATION
Payer: COMMERCIAL

## 2019-06-11 VITALS
BODY MASS INDEX: 24.67 KG/M2 | HEART RATE: 74 BPM | SYSTOLIC BLOOD PRESSURE: 122 MMHG | TEMPERATURE: 97.9 F | OXYGEN SATURATION: 93 % | DIASTOLIC BLOOD PRESSURE: 83 MMHG | RESPIRATION RATE: 16 BRPM | WEIGHT: 157.19 LBS | HEIGHT: 67 IN

## 2019-06-11 PROBLEM — Z78.9 NO CONTRAINDICATION TO DEEP VEIN THROMBOSIS (DVT) PROPHYLAXIS: Status: RESOLVED | Noted: 2019-06-04 | Resolved: 2019-06-11

## 2019-06-11 PROBLEM — Z02.9 DISCHARGE PLANNING ISSUES: Status: RESOLVED | Noted: 2019-06-10 | Resolved: 2019-06-11

## 2019-06-11 PROBLEM — Z75.8 DISCHARGE PLANNING ISSUES: Status: RESOLVED | Noted: 2019-06-10 | Resolved: 2019-06-11

## 2019-06-11 PROBLEM — S01.81XA FACIAL LACERATION: Status: ACTIVE | Noted: 2019-06-11

## 2019-06-11 PROBLEM — T14.90XA TRAUMA: Status: RESOLVED | Noted: 2019-06-04 | Resolved: 2019-06-11

## 2019-06-11 PROBLEM — S06.9XAA TRAUMATIC BRAIN INJURY (HCC): Status: ACTIVE | Noted: 2019-06-11

## 2019-06-11 PROBLEM — Z78.9 NO CONTRAINDICATION TO DEEP VEIN THROMBOSIS (DVT) PROPHYLAXIS: Status: ACTIVE | Noted: 2019-06-04

## 2019-06-11 PROCEDURE — A9270 NON-COVERED ITEM OR SERVICE: HCPCS | Performed by: PHYSICAL MEDICINE & REHABILITATION

## 2019-06-11 PROCEDURE — 94760 N-INVAS EAR/PLS OXIMETRY 1: CPT

## 2019-06-11 PROCEDURE — 700111 HCHG RX REV CODE 636 W/ 250 OVERRIDE (IP): Performed by: NURSE PRACTITIONER

## 2019-06-11 PROCEDURE — A9270 NON-COVERED ITEM OR SERVICE: HCPCS | Performed by: NURSE PRACTITIONER

## 2019-06-11 PROCEDURE — 700102 HCHG RX REV CODE 250 W/ 637 OVERRIDE(OP): Performed by: PHYSICAL MEDICINE & REHABILITATION

## 2019-06-11 PROCEDURE — 700111 HCHG RX REV CODE 636 W/ 250 OVERRIDE (IP): Performed by: PHYSICAL MEDICINE & REHABILITATION

## 2019-06-11 PROCEDURE — A9270 NON-COVERED ITEM OR SERVICE: HCPCS | Performed by: SURGERY

## 2019-06-11 PROCEDURE — 700112 HCHG RX REV CODE 229: Performed by: SURGERY

## 2019-06-11 PROCEDURE — 770010 HCHG ROOM/CARE - REHAB SEMI PRIVAT*

## 2019-06-11 PROCEDURE — 700102 HCHG RX REV CODE 250 W/ 637 OVERRIDE(OP): Performed by: SURGERY

## 2019-06-11 PROCEDURE — 700102 HCHG RX REV CODE 250 W/ 637 OVERRIDE(OP): Performed by: NURSE PRACTITIONER

## 2019-06-11 PROCEDURE — 99223 1ST HOSP IP/OBS HIGH 75: CPT | Performed by: PHYSICAL MEDICINE & REHABILITATION

## 2019-06-11 RX ORDER — BUTALBITAL, ACETAMINOPHEN AND CAFFEINE 50; 325; 40 MG/1; MG/1; MG/1
1-2 TABLET ORAL EVERY 6 HOURS PRN
Qty: 30 TAB | Refills: 0 | Status: ON HOLD
Start: 2019-06-11 | End: 2019-06-20

## 2019-06-11 RX ORDER — OXYCODONE HYDROCHLORIDE 5 MG/1
5 TABLET ORAL EVERY 6 HOURS PRN
Qty: 30 TAB | Refills: 0 | Status: ON HOLD
Start: 2019-06-11 | End: 2019-06-20

## 2019-06-11 RX ORDER — OXYCODONE HYDROCHLORIDE 10 MG/1
10 TABLET ORAL
Status: DISCONTINUED | OUTPATIENT
Start: 2019-06-11 | End: 2019-06-21 | Stop reason: HOSPADM

## 2019-06-11 RX ORDER — ONDANSETRON 2 MG/ML
4 INJECTION INTRAMUSCULAR; INTRAVENOUS 4 TIMES DAILY PRN
Status: DISCONTINUED | OUTPATIENT
Start: 2019-06-11 | End: 2019-06-21 | Stop reason: HOSPADM

## 2019-06-11 RX ORDER — HYDRALAZINE HYDROCHLORIDE 25 MG/1
25 TABLET, FILM COATED ORAL EVERY 8 HOURS PRN
Status: DISCONTINUED | OUTPATIENT
Start: 2019-06-11 | End: 2019-06-21 | Stop reason: HOSPADM

## 2019-06-11 RX ORDER — OXYCODONE HYDROCHLORIDE 5 MG/1
5 TABLET ORAL
Status: DISCONTINUED | OUTPATIENT
Start: 2019-06-11 | End: 2019-06-21 | Stop reason: HOSPADM

## 2019-06-11 RX ORDER — BISACODYL 10 MG
10 SUPPOSITORY, RECTAL RECTAL
Status: DISCONTINUED | OUTPATIENT
Start: 2019-06-11 | End: 2019-06-18

## 2019-06-11 RX ORDER — ECHINACEA PURPUREA EXTRACT 125 MG
2 TABLET ORAL PRN
Status: DISCONTINUED | OUTPATIENT
Start: 2019-06-11 | End: 2019-06-21 | Stop reason: HOSPADM

## 2019-06-11 RX ORDER — ACETAMINOPHEN 325 MG/1
650 TABLET ORAL EVERY 4 HOURS PRN
Status: DISCONTINUED | OUTPATIENT
Start: 2019-06-11 | End: 2019-06-21 | Stop reason: HOSPADM

## 2019-06-11 RX ORDER — ALUMINA, MAGNESIA, AND SIMETHICONE 2400; 2400; 240 MG/30ML; MG/30ML; MG/30ML
20 SUSPENSION ORAL
Status: DISCONTINUED | OUTPATIENT
Start: 2019-06-11 | End: 2019-06-21 | Stop reason: HOSPADM

## 2019-06-11 RX ORDER — POLYETHYLENE GLYCOL 3350 17 G/17G
1 POWDER, FOR SOLUTION ORAL
Status: DISCONTINUED | OUTPATIENT
Start: 2019-06-11 | End: 2019-06-18

## 2019-06-11 RX ORDER — AMOXICILLIN 250 MG
2 CAPSULE ORAL 2 TIMES DAILY
Status: DISCONTINUED | OUTPATIENT
Start: 2019-06-11 | End: 2019-06-18

## 2019-06-11 RX ORDER — OXYCODONE HYDROCHLORIDE 5 MG/1
5 TABLET ORAL EVERY 6 HOURS PRN
Status: DISCONTINUED | OUTPATIENT
Start: 2019-06-11 | End: 2019-06-11 | Stop reason: HOSPADM

## 2019-06-11 RX ORDER — BUTALBITAL, ACETAMINOPHEN AND CAFFEINE 50; 325; 40 MG/1; MG/1; MG/1
2 TABLET ORAL EVERY 6 HOURS PRN
Status: CANCELLED | OUTPATIENT
Start: 2019-06-11

## 2019-06-11 RX ORDER — OMEPRAZOLE 20 MG/1
20 CAPSULE, DELAYED RELEASE ORAL DAILY
Status: DISCONTINUED | OUTPATIENT
Start: 2019-06-12 | End: 2019-06-18

## 2019-06-11 RX ORDER — BUTALBITAL, ACETAMINOPHEN AND CAFFEINE 50; 325; 40 MG/1; MG/1; MG/1
2 TABLET ORAL EVERY 6 HOURS PRN
Status: DISCONTINUED | OUTPATIENT
Start: 2019-06-11 | End: 2019-06-21 | Stop reason: HOSPADM

## 2019-06-11 RX ORDER — ONDANSETRON 4 MG/1
4 TABLET, ORALLY DISINTEGRATING ORAL 4 TIMES DAILY PRN
Status: DISCONTINUED | OUTPATIENT
Start: 2019-06-11 | End: 2019-06-21 | Stop reason: HOSPADM

## 2019-06-11 RX ORDER — TRAZODONE HYDROCHLORIDE 50 MG/1
50 TABLET ORAL
Status: DISCONTINUED | OUTPATIENT
Start: 2019-06-11 | End: 2019-06-21 | Stop reason: HOSPADM

## 2019-06-11 RX ORDER — POLYVINYL ALCOHOL 14 MG/ML
1 SOLUTION/ DROPS OPHTHALMIC PRN
Status: DISCONTINUED | OUTPATIENT
Start: 2019-06-11 | End: 2019-06-21 | Stop reason: HOSPADM

## 2019-06-11 RX ADMIN — SENNOSIDES AND DOCUSATE SODIUM 2 TABLET: 8.6; 5 TABLET ORAL at 21:03

## 2019-06-11 RX ADMIN — POLYETHYLENE GLYCOL 3350 1 PACKET: 17 POWDER, FOR SOLUTION ORAL at 04:40

## 2019-06-11 RX ADMIN — OXYCODONE HYDROCHLORIDE 5 MG: 5 TABLET ORAL at 08:50

## 2019-06-11 RX ADMIN — ACETAMINOPHEN 1000 MG: 500 TABLET ORAL at 13:40

## 2019-06-11 RX ADMIN — BUTALBITAL, ACETAMINOPHEN, AND CAFFEINE 2 TABLET: 50; 325; 40 TABLET ORAL at 10:58

## 2019-06-11 RX ADMIN — MAGNESIUM HYDROXIDE 30 ML: 400 SUSPENSION ORAL at 04:40

## 2019-06-11 RX ADMIN — BUTALBITAL, ACETAMINOPHEN, AND CAFFEINE 2 TABLET: 50; 325; 40 TABLET ORAL at 04:45

## 2019-06-11 RX ADMIN — DOCUSATE SODIUM 100 MG: 100 CAPSULE, LIQUID FILLED ORAL at 04:40

## 2019-06-11 RX ADMIN — ENOXAPARIN SODIUM 30 MG: 100 INJECTION SUBCUTANEOUS at 04:40

## 2019-06-11 RX ADMIN — ENOXAPARIN SODIUM 40 MG: 100 INJECTION SUBCUTANEOUS at 19:54

## 2019-06-11 RX ADMIN — OXYCODONE HYDROCHLORIDE 10 MG: 10 TABLET ORAL at 19:54

## 2019-06-11 ASSESSMENT — ENCOUNTER SYMPTOMS
DOUBLE VISION: 0
FEVER: 0
MYALGIAS: 0
DIZZINESS: 1
SENSORY CHANGE: 0
BACK PAIN: 0
BLURRED VISION: 0
FOCAL WEAKNESS: 0
CONSTIPATION: 0
SHORTNESS OF BREATH: 0
PHOTOPHOBIA: 1
CHILLS: 0
VOMITING: 0
NECK PAIN: 0
ABDOMINAL PAIN: 0
TINGLING: 0
NAUSEA: 0
HEADACHES: 1
SPEECH CHANGE: 0

## 2019-06-11 ASSESSMENT — PATIENT HEALTH QUESTIONNAIRE - PHQ9
2. FEELING DOWN, DEPRESSED, IRRITABLE, OR HOPELESS: NOT AT ALL
SUM OF ALL RESPONSES TO PHQ9 QUESTIONS 1 AND 2: 0
2. FEELING DOWN, DEPRESSED, IRRITABLE, OR HOPELESS: NOT AT ALL
SUM OF ALL RESPONSES TO PHQ9 QUESTIONS 1 AND 2: 0
1. LITTLE INTEREST OR PLEASURE IN DOING THINGS: NOT AT ALL
1. LITTLE INTEREST OR PLEASURE IN DOING THINGS: NOT AT ALL

## 2019-06-11 ASSESSMENT — LIFESTYLE VARIABLES
CONSUMPTION TOTAL: NEGATIVE
EVER_SMOKED: YES
TOTAL SCORE: 0
HOW MANY TIMES IN THE PAST YEAR HAVE YOU HAD 5 OR MORE DRINKS IN A DAY: 0
AVERAGE NUMBER OF DAYS PER WEEK YOU HAVE A DRINK CONTAINING ALCOHOL: 2
EVER HAD A DRINK FIRST THING IN THE MORNING TO STEADY YOUR NERVES TO GET RID OF A HANGOVER: NO
HAVE YOU EVER FELT YOU SHOULD CUT DOWN ON YOUR DRINKING: NO
TOTAL SCORE: 0
ON A TYPICAL DAY WHEN YOU DRINK ALCOHOL HOW MANY DRINKS DO YOU HAVE: 0
HAVE PEOPLE ANNOYED YOU BY CRITICIZING YOUR DRINKING: NO
TOTAL SCORE: 0
ALCOHOL_USE: YES
EVER FELT BAD OR GUILTY ABOUT YOUR DRINKING: NO

## 2019-06-11 NOTE — PROGRESS NOTES
Patient to discharge to [facility]. Verbal and written discharge instructions provided to patient and family members. Patient verbalizes understanding. PIV removed. All personal belongings gathered. COBRA completed. Report called to [facility] no nurse assigned at this time, awaiting returned call. Facial and scalp staples removed per order.     1500 Report given to Carol

## 2019-06-11 NOTE — PROGRESS NOTES
Assumed patient care at 0700 and received bedside report. Patient alert and oriented times 4. Patient denies numbness and tingling. Patient denies chest pain, shortness of breath, blurry/double vision. Patient ambulating stand by assist. Patient continent of bowel and bladder. Patient is tolerating regular, dysphasia 3 diet. Plan of care discussed, education provided on all administered medications, hourly rounding and Q4 neuro checks in place.
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
## 2019-06-11 NOTE — CARE PLAN
Problem: Safety  Goal: Will remain free from injury    Intervention: Provide assistance with mobility  Assist in ambulation to bathroom.

## 2019-06-11 NOTE — DISCHARGE PLANNING
Anticipated Discharge Disposition: IRF    Action: Pt new to the unit, transfer in from SICU.    Pt is pending ins authorization for [facility].    LSW contacted by Dennis Garcia with [facility]. Pt's ins has authorized IRF. Pt will be transported to [facility] at 1515. Dr. New is the accepting MD. JEANINE Barriga and Mariama FULTON are aware.     LSW provided COBRA to BSN.    Barriers to Discharge: None    Plan: Pt to dc to [facility] at 1515.

## 2019-06-11 NOTE — DISCHARGE PLANNING
Dr. New has accepted.  Transport has been arranged for 1515.  Effie Mcgrath (SW) & Mariama (BSN) are aware.

## 2019-06-11 NOTE — CARE PLAN
Problem: Communication  Goal: The ability to communicate needs accurately and effectively will improve  Outcome: PROGRESSING AS EXPECTED  Encouraged use of call light, assessed needs, encouraged pt to voice feelings.     Problem: Safety  Goal: Will remain free from injury  Outcome: PROGRESSING AS EXPECTED  Bed in lowest position, nonskid socks on, call light within reach, personal belongings within reach, toileting offered.

## 2019-06-11 NOTE — CARE PLAN
Problem: Bowel/Gastric:  Goal: Normal bowel function is maintained or improved    Intervention: Educate patient and significant other/support system about diet, fluid intake, medications and activity to promote bowel function  Educate pt on the importance of taking stool softeners to promote healthy bowel habits.
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
## 2019-06-12 LAB
25(OH)D3 SERPL-MCNC: 17 NG/ML (ref 30–100)
ALBUMIN SERPL BCP-MCNC: 3.6 G/DL (ref 3.2–4.9)
ALBUMIN/GLOB SERPL: 1 G/DL
ALP SERPL-CCNC: 50 U/L (ref 30–99)
ALT SERPL-CCNC: 13 U/L (ref 2–50)
ANION GAP SERPL CALC-SCNC: 7 MMOL/L (ref 0–11.9)
AST SERPL-CCNC: 11 U/L (ref 12–45)
BASOPHILS # BLD AUTO: 0.3 % (ref 0–1.8)
BASOPHILS # BLD: 0.04 K/UL (ref 0–0.12)
BILIRUB SERPL-MCNC: 0.3 MG/DL (ref 0.1–1.5)
BUN SERPL-MCNC: 15 MG/DL (ref 8–22)
CALCIUM SERPL-MCNC: 9.2 MG/DL (ref 8.5–10.5)
CHLORIDE SERPL-SCNC: 98 MMOL/L (ref 96–112)
CHOLEST SERPL-MCNC: 155 MG/DL (ref 100–199)
CO2 SERPL-SCNC: 30 MMOL/L (ref 20–33)
CREAT SERPL-MCNC: 0.87 MG/DL (ref 0.5–1.4)
EOSINOPHIL # BLD AUTO: 0.31 K/UL (ref 0–0.51)
EOSINOPHIL NFR BLD: 2.5 % (ref 0–6.9)
ERYTHROCYTE [DISTWIDTH] IN BLOOD BY AUTOMATED COUNT: 44.8 FL (ref 35.9–50)
EST. AVERAGE GLUCOSE BLD GHB EST-MCNC: 111 MG/DL
GLOBULIN SER CALC-MCNC: 3.7 G/DL (ref 1.9–3.5)
GLUCOSE SERPL-MCNC: 121 MG/DL (ref 65–99)
HBA1C MFR BLD: 5.5 % (ref 0–5.6)
HCT VFR BLD AUTO: 38.7 % (ref 42–52)
HDLC SERPL-MCNC: 30 MG/DL
HGB BLD-MCNC: 12.7 G/DL (ref 14–18)
IMM GRANULOCYTES # BLD AUTO: 0.1 K/UL (ref 0–0.11)
IMM GRANULOCYTES NFR BLD AUTO: 0.8 % (ref 0–0.9)
LDLC SERPL CALC-MCNC: 100 MG/DL
LYMPHOCYTES # BLD AUTO: 1.71 K/UL (ref 1–4.8)
LYMPHOCYTES NFR BLD: 13.9 % (ref 22–41)
MCH RBC QN AUTO: 31.3 PG (ref 27–33)
MCHC RBC AUTO-ENTMCNC: 32.8 G/DL (ref 33.7–35.3)
MCV RBC AUTO: 95.3 FL (ref 81.4–97.8)
MONOCYTES # BLD AUTO: 0.98 K/UL (ref 0–0.85)
MONOCYTES NFR BLD AUTO: 8 % (ref 0–13.4)
NEUTROPHILS # BLD AUTO: 9.17 K/UL (ref 1.82–7.42)
NEUTROPHILS NFR BLD: 74.5 % (ref 44–72)
NRBC # BLD AUTO: 0 K/UL
NRBC BLD-RTO: 0 /100 WBC
PLATELET # BLD AUTO: 374 K/UL (ref 164–446)
PMV BLD AUTO: 9.8 FL (ref 9–12.9)
POTASSIUM SERPL-SCNC: 4.1 MMOL/L (ref 3.6–5.5)
PROT SERPL-MCNC: 7.3 G/DL (ref 6–8.2)
RBC # BLD AUTO: 4.06 M/UL (ref 4.7–6.1)
SODIUM SERPL-SCNC: 135 MMOL/L (ref 135–145)
TRIGL SERPL-MCNC: 127 MG/DL (ref 0–149)
TSH SERPL DL<=0.005 MIU/L-ACNC: 1.72 UIU/ML (ref 0.38–5.33)
WBC # BLD AUTO: 12.3 K/UL (ref 4.8–10.8)

## 2019-06-12 PROCEDURE — 82306 VITAMIN D 25 HYDROXY: CPT

## 2019-06-12 PROCEDURE — 700111 HCHG RX REV CODE 636 W/ 250 OVERRIDE (IP): Performed by: PHYSICAL MEDICINE & REHABILITATION

## 2019-06-12 PROCEDURE — 97530 THERAPEUTIC ACTIVITIES: CPT

## 2019-06-12 PROCEDURE — 80053 COMPREHEN METABOLIC PANEL: CPT

## 2019-06-12 PROCEDURE — 97162 PT EVAL MOD COMPLEX 30 MIN: CPT

## 2019-06-12 PROCEDURE — 700102 HCHG RX REV CODE 250 W/ 637 OVERRIDE(OP): Performed by: PHYSICAL MEDICINE & REHABILITATION

## 2019-06-12 PROCEDURE — 770010 HCHG ROOM/CARE - REHAB SEMI PRIVAT*

## 2019-06-12 PROCEDURE — A9270 NON-COVERED ITEM OR SERVICE: HCPCS | Performed by: PHYSICAL MEDICINE & REHABILITATION

## 2019-06-12 PROCEDURE — 97165 OT EVAL LOW COMPLEX 30 MIN: CPT

## 2019-06-12 PROCEDURE — 97535 SELF CARE MNGMENT TRAINING: CPT

## 2019-06-12 PROCEDURE — 99233 SBSQ HOSP IP/OBS HIGH 50: CPT | Performed by: PHYSICAL MEDICINE & REHABILITATION

## 2019-06-12 PROCEDURE — 80061 LIPID PANEL: CPT

## 2019-06-12 PROCEDURE — 84443 ASSAY THYROID STIM HORMONE: CPT

## 2019-06-12 PROCEDURE — 92610 EVALUATE SWALLOWING FUNCTION: CPT

## 2019-06-12 PROCEDURE — 85025 COMPLETE CBC W/AUTO DIFF WBC: CPT

## 2019-06-12 PROCEDURE — 36415 COLL VENOUS BLD VENIPUNCTURE: CPT

## 2019-06-12 PROCEDURE — 83036 HEMOGLOBIN GLYCOSYLATED A1C: CPT

## 2019-06-12 PROCEDURE — 92523 SPEECH SOUND LANG COMPREHEN: CPT

## 2019-06-12 RX ADMIN — OMEPRAZOLE 20 MG: 20 CAPSULE, DELAYED RELEASE ORAL at 08:21

## 2019-06-12 RX ADMIN — OXYCODONE HYDROCHLORIDE 5 MG: 5 TABLET ORAL at 13:15

## 2019-06-12 RX ADMIN — BUTALBITAL, ACETAMINOPHEN AND CAFFEINE 2 TABLET: 50; 325; 40 TABLET ORAL at 07:48

## 2019-06-12 RX ADMIN — ENOXAPARIN SODIUM 40 MG: 100 INJECTION SUBCUTANEOUS at 08:21

## 2019-06-12 RX ADMIN — SENNOSIDES AND DOCUSATE SODIUM 2 TABLET: 8.6; 5 TABLET ORAL at 20:48

## 2019-06-12 RX ADMIN — OXYCODONE HYDROCHLORIDE 10 MG: 10 TABLET ORAL at 03:43

## 2019-06-12 RX ADMIN — BUTALBITAL, ACETAMINOPHEN AND CAFFEINE 2 TABLET: 50; 325; 40 TABLET ORAL at 17:26

## 2019-06-12 ASSESSMENT — BALANCE ASSESSMENTS
LONG VERSION TOTAL SCORE (MAX 56): 50
STANDING UNSUPPORTED: 3
REACHING FORWARD WITH OUTSTRETCHED ARM WHILE STANDING: 4
STANDING ON ONE LEG: 4
STANDING UNSUPPORTED WITH EYES CLOSED: 3
STANDING UNSUPPORTED ONE FOOT IN FRONT: 4
LONG VERSION TOTAL SCORE (MAX 56): 50
TRANSFERS: 2
LOOK OVER LEFT AND RIGHT SHOULDERS WHILE STANDING: 4
STANDING UNSUPPORTED WITH FEET TOGETHER: 3
SITTING UNSUPPORTED: 4
STANDING TO SITTING: 4
PICK UP OBJECT FROM THE FLOOR FROM A STANDING POSITION: 3
PLACE ALTERNATE FOOT ON STEP OR STOOL WHILE STANDING UNSUPPORTED: 4
SITTING TO STANDING: 4
TURN 360 DEGREES: 4

## 2019-06-12 ASSESSMENT — BRIEF INTERVIEW FOR MENTAL STATUS (BIMS)
BIMS SUMMARY SCORE: 14
INITIAL REPETITION OF BED BLUE SOCK - FIRST ATTEMPT: 3
BIMS SUMMARY SCORE: 14
ASKED TO RECALL BLUE: YES, NO CUE REQUIRED
ASKED TO RECALL BLUE: YES, NO CUE REQUIRED
ASKED TO RECALL BED: YES, NO CUE REQUIRED
WHAT YEAR IS IT: CORRECT
WHAT YEAR IS IT: CORRECT
ASKED TO RECALL BED: YES, AFTER CUEING (A PIECE OF FURNITURE")"
ASKED TO RECALL SOCK: YES, NO CUE REQUIRED
WHAT DAY OF THE WEEK IS IT: CORRECT
INITIAL REPETITION OF BED BLUE SOCK - FIRST ATTEMPT: 3
WHAT MONTH IS IT: ACCURATE WITHIN 5 DAYS
WHAT MONTH IS IT: ACCURATE WITHIN 5 DAYS
WHAT DAY OF THE WEEK IS IT: INCORRECT
ASKED TO RECALL SOCK: YES, NO CUE REQUIRED

## 2019-06-12 ASSESSMENT — ACTIVITIES OF DAILY LIVING (ADL): TOILETING: INDEPENDENT

## 2019-06-12 ASSESSMENT — PATIENT HEALTH QUESTIONNAIRE - PHQ9
SUM OF ALL RESPONSES TO PHQ9 QUESTIONS 1 AND 2: 0
2. FEELING DOWN, DEPRESSED, IRRITABLE, OR HOPELESS: NOT AT ALL
1. LITTLE INTEREST OR PLEASURE IN DOING THINGS: NOT AT ALL
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
## 2019-06-12 NOTE — REHAB-PHARMACY IDT TEAM NOTE
Pharmacy   Pharmacy  Antibiotics/Antifungals/Antivirals:  Medication:      Active Orders     None        Route:         None  Stop Date:  N/A  Reason:   Antihypertensives/Cardiac:  Medication:    Orders (72h ago through future)    Start     Ordered    06/11/19 1629  hydrALAZINE (APRESOLINE) tablet 25 mg  EVERY 8 HOURS PRN      06/11/19 1629        Patient Vitals for the past 24 hrs:   BP Pulse   06/12/19 0800 111/77 90   06/11/19 1905 - 85   06/11/19 1835 133/78 86       Anticoagulation:  Medication:  lovenox  INR:      Other key medications:     A review of the medication list reveals no issues at this time.    Section completed by:  Ulises Mendez RPH
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
## 2019-06-12 NOTE — CARE PLAN
Problem: Safety  Goal: Will remain free from injury  Using call light for assist to toilet/transfer. Call light within reach, bed in low position.    Problem: Pain Management  Goal: Pain level will decrease to patient's comfort goal  PRN pain med administered at HS per request. Good effect. Upon reassess, resting in bed, eyes closed, resp even, unlabored.

## 2019-06-12 NOTE — PROGRESS NOTES
Patient admitted to facility at 16:20 via wheel chair; accompanied by hospital transport.  Patient assisted to room and positioned in bed for comfort and safety; call light within reach.  Patient assisted with stowing belongings and oriented to room and facility.  Admission assessment performed, skin checked by 2 RN and documented in computer.  Admission paperwork completed; signed copies placed in chart.  Will continue to monitor.

## 2019-06-12 NOTE — CARE PLAN
Problem: Bowel/Gastric:  Goal: Normal bowel function is maintained or improved  Pt. is continent of bowels. LBM 6/12/19.    Problem: Knowledge Deficit  Goal: Knowledge of disease process/condition, treatment plan, diagnostic tests, and medications will improve  Pt's family was educated about the importance of checking with nursing before bringing food. Pt's sister was asking if she could bring [place] fried chicken or smoothies. Explanation was provided about therapeutic dinning and that patient needs supervision while eating. Pt. is currently on Dys II, Thin. Will monitor.

## 2019-06-12 NOTE — THERAPY
Speech Language Pathology  Daily Treatment     Patient Name: Govind Corral  Age:  43 y.o., Sex:  male  Medical Record #: [id number]  Today's Date: 6/12/2019       Assessment    Pt's diet downgraded to a dysphagia 2 at lunch d/t difficulty swallowing chicken.  MBSS to be completed tomorrow          06/12/19 1131   Dysphagia    Diet / Liquid Recommendation Dysphagia II;Thin Liquid

## 2019-06-12 NOTE — FLOWSHEET NOTE
06/11/19 6585   Type of Assessment   Assessment Yes   Patient History   Pulmonary Diagnosis None   Surgical Procedures None   Home O2 No   Home Treatments/Frequency No   COPD Risk Screening   Do you have a history of COPD? No   Smoking History   Have you ever smoked Yes   Have you smoked in the last 12 months Yes   Have you quit smoking Yes   Confirm Quit Date 06/11/17   Level Of Consciousness   Level of Consciousness Alert   Respiratory WDL   Respiratory (WDL) X   Chest Exam   Respiration 18   Pulse 85   Oximetry   #Pulse Oximetry (Single Determination) Yes   Oxygen   Home O2 Use Prior To Admission? No   Pulse Oximetry 96 %   O2 Daily Delivery Respiratory  Room Air with O2 Available   Smoking History   Do you have any pipes/cigarettes/lighter/matches/etc in your possesion No
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
## 2019-06-12 NOTE — PROGRESS NOTES
Received patient during shift change, report rec'd from day shift RN. Resting in bed, VS stable on room air. Per report continent of B&B, contact guard assist for transfers. A&O x 4, able to make needs known. Bed in low position, call light within reach.

## 2019-06-13 ENCOUNTER — APPOINTMENT (OUTPATIENT)
Dept: PAIN MANAGEMENT | Facility: REHABILITATION | Age: 44
DRG: 950 | End: 2019-06-13
Attending: PHYSICAL MEDICINE & REHABILITATION
Payer: COMMERCIAL

## 2019-06-13 ENCOUNTER — APPOINTMENT (OUTPATIENT)
Dept: RADIOLOGY | Facility: REHABILITATION | Age: 44
DRG: 950 | End: 2019-06-13
Attending: PHYSICAL MEDICINE & REHABILITATION
Payer: COMMERCIAL

## 2019-06-13 PROCEDURE — 97530 THERAPEUTIC ACTIVITIES: CPT

## 2019-06-13 PROCEDURE — 97112 NEUROMUSCULAR REEDUCATION: CPT

## 2019-06-13 PROCEDURE — A9270 NON-COVERED ITEM OR SERVICE: HCPCS | Performed by: PHYSICAL MEDICINE & REHABILITATION

## 2019-06-13 PROCEDURE — 99233 SBSQ HOSP IP/OBS HIGH 50: CPT | Performed by: PHYSICAL MEDICINE & REHABILITATION

## 2019-06-13 PROCEDURE — 770010 HCHG ROOM/CARE - REHAB SEMI PRIVAT*

## 2019-06-13 PROCEDURE — 700111 HCHG RX REV CODE 636 W/ 250 OVERRIDE (IP): Performed by: PHYSICAL MEDICINE & REHABILITATION

## 2019-06-13 PROCEDURE — 700102 HCHG RX REV CODE 250 W/ 637 OVERRIDE(OP): Performed by: PHYSICAL MEDICINE & REHABILITATION

## 2019-06-13 PROCEDURE — 97535 SELF CARE MNGMENT TRAINING: CPT

## 2019-06-13 PROCEDURE — G0515 COGNITIVE SKILLS DEVELOPMENT: HCPCS

## 2019-06-13 PROCEDURE — 92611 MOTION FLUOROSCOPY/SWALLOW: CPT

## 2019-06-13 PROCEDURE — 97110 THERAPEUTIC EXERCISES: CPT

## 2019-06-13 PROCEDURE — 97116 GAIT TRAINING THERAPY: CPT

## 2019-06-13 PROCEDURE — 74230 X-RAY XM SWLNG FUNCJ C+: CPT

## 2019-06-13 RX ADMIN — OMEPRAZOLE 20 MG: 20 CAPSULE, DELAYED RELEASE ORAL at 07:51

## 2019-06-13 RX ADMIN — ACETAMINOPHEN 650 MG: 325 TABLET, FILM COATED ORAL at 10:59

## 2019-06-13 RX ADMIN — VITAMIN D, TAB 1000IU (100/BT) 2000 UNITS: 25 TAB at 07:50

## 2019-06-13 RX ADMIN — BUTALBITAL, ACETAMINOPHEN AND CAFFEINE 2 TABLET: 50; 325; 40 TABLET ORAL at 07:50

## 2019-06-13 RX ADMIN — SENNOSIDES AND DOCUSATE SODIUM 2 TABLET: 8.6; 5 TABLET ORAL at 20:37

## 2019-06-13 RX ADMIN — SENNOSIDES AND DOCUSATE SODIUM 2 TABLET: 8.6; 5 TABLET ORAL at 07:50

## 2019-06-13 RX ADMIN — BUTALBITAL, ACETAMINOPHEN AND CAFFEINE 2 TABLET: 50; 325; 40 TABLET ORAL at 14:47

## 2019-06-13 RX ADMIN — ENOXAPARIN SODIUM 40 MG: 100 INJECTION SUBCUTANEOUS at 07:53

## 2019-06-13 NOTE — THERAPY
Speech Language Pathology  Video Swallow     Patient Name:  Rehab Tuesday  AGE:  43 y.o., SEX:  male  Medical Record #:  [id number]  Today's Date: 6/13/2019     Objective       06/13/19 0901   History / Background Information   Prior Level of Function for Eating / Swallowing WFL   Diagnosis Traumatic brain injury    Onset Date Of Dysphagia 6/4/19   Dysphagia Symptoms Warranting Video Swallow coughing with thin liquids, difficulty swallowing harder textures    General Anatomy / Physiology WFL   Dentition Edentulous   Procedure   Patient Seated in  wheelchair   Seated at (Degrees) 90   Views Completed Lateral   Consistencies / Presentation Method   Consistencies / Presentation Method Tested   Puree Teaspoon   Thin Liquids Cup;Teaspoon   Mechanical Soft / Mixed Teaspoon   Solid Teaspoon   Barium Tablet Cup   Oral Phase   Oral Phase X   Puree Premature Spillage Into Valleculae   Mechanical Soft / Mixed Premature Spillage Into Valleculea   Solid Premature Spillage Into  Pyriform Sinus   Pharyngeal Phase   Pharyngeal Phase X   Thin Liquids Residue In Valleculae   Mechanical Soft / Mixed Residue In Valleculae;Residue On Posterior Pharyngeal Wall   Esophageal Phase   Esophageal Phase WDL   Compensatory Strategies Attempted   Compensatory Strategies Attempted Yes   Multiple Swallows Effective in clearing pharyngeal residue    Impression   Dysphagia Present Yes   Oral - Pharyngeal Mild Impairment   Prognosis   Prognosis for Improvement Good   Barriers to Improvement PT does not currently have his dentures with him, chewing food above a dysphagia 2 level is difficult    Positive Indicators for Improvement No aspiration/penetration noted    Recommendations   Diet / Liquid Recommendation Dysphagia II;Thin Liquid   Medication Administration  Whole with Liquid Wash   Strategies / Precautions Small Bites;Small Sips;Multiple Swallows;Effortful Swallow;Sitting Upright at 90 Degrees while Eating;Stay Upright at Least 30 Minutes After  Meals;Oral Care After Meals;Monitor Temperature and Lung Sounds   Interventions Therapeutic Dining Program for Meals;Compensatory Safe Swallow Strategy Training;Dysphagia Therapy by SLP   SLP Contact Information (Name / Extension) Ankit Youngerkarin MS, CCC-SLP/ [id number]   Video Tape Number [id number]   SLP Total Time Spent   SLP Individual Total Time Spent (Mins) 30   Charge Group   SLP Video Swallow / FEES Videofluoroscopic Evaluation        Assessment    MBSS completed.  Pt trialed thin liquids (5 mL, 10 mL, cup sip, large cup sip), pudding (5 mL, 10 mL), mech soft, regular textures and a barium capsule with a thin liquid wash.  Pt was noted to have premature spillage to the valleculae with puree and thin liquids, premature spillage to the vallecular with regular textures (d/t extended chewing time as pt was not wearing his dentures), mild-moderate pharyngeal residue with mech soft textures and mild vallecular residue with thin liquids.  Pt was able to clear all vallecular residue with a second swallow.  Pt tolerated the barium capsule without difficulty.      Plan    Recommend pt remain on a dysphagia 2 diet with thin liquids until his dentures are brought in.  Pt's mother reported that she would bring them in this evening.

## 2019-06-13 NOTE — CARE PLAN
Problem: Safety  Goal: Will remain free from falls  Using call light as needed for assist. Bed in low position, call light within reach.    Problem: Pain Management  Goal: Pain level will decrease to patient's comfort goal  No c/o pain, no request for PRN pain med at HS. Resting in bed, eyes closed, resp even, unlabored.
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
## 2019-06-13 NOTE — CARE PLAN
"Problem: Mobility  Goal: STG-Within one week, patient will ambulate community distances  1) Individualized goal: x200 ft with SPV, no AD.   2) Interventions:  PT Group Therapy, PT E Stim Attended, PT Gait Training, PT Self Care/Home Eval, PT Therapeutic Exercises, PT TENS Application, PT Neuro Re-Ed/Balance, PT Aquatic Therapy, PT Therapeutic Activity, PT Manual Therapy and PT Evaluation     Outcome: NOT MET  The PT eval was completed yesterday.  We will monitor pt's progress towards his goals this week.  Goal: STG-Within one week, patient will ambulate up/down flight of stairs  1) Individualized goal: 12 - 6\" steps with SPV w/o use of handrails.   2) Interventions:  PT Group Therapy, PT E Stim Attended, PT Gait Training, PT Self Care/Home Eval, PT Therapeutic Exercises, PT TENS Application, PT Neuro Re-Ed/Balance, PT Aquatic Therapy, PT Therapeutic Activity, PT Manual Therapy and PT Evaluation       Outcome: NOT MET  The PT eval was completed yesterday.  We will monitor pt's progress towards his goals this week.    Problem: Mobility Transfers  Goal: STG-Within one week, patient will transfer bed to chair  1) Individualized goal: with SPV w/o AD.    2) Interventions:  PT Group Therapy, PT E Stim Attended, PT Gait Training, PT Self Care/Home Eval, PT Therapeutic Exercises, PT TENS Application, PT Neuro Re-Ed/Balance, PT Aquatic Therapy, PT Therapeutic Activity, PT Manual Therapy and PT Evaluation       Outcome: NOT MET  The PT eval was completed yesterday.  We will monitor pt's progress towards his goals this week.      "

## 2019-06-13 NOTE — REHAB-NURSING IDT TEAM NOTE
Nursing   Nursing  Diet/Nutrition:  Dysphagia II and Thin Liquids  Medication Administration:  Float Whole with Puree  % consumed at meals in last 24 hours:  Consumed % of meals per documentation.  Meal/Snack Consumption for the past 24 hrs:   Oral Nutrition   06/12/19 1800 Between % Consumed;Dinner   06/12/19 1602 Lunch;Between 50-75% Consumed   06/12/19 0945 Breakfast;Between % Consumed       Snack schedule:  None  Supplement:  n/a  Appetite:  Good  Fluid Intake/Output in past 24 hours: In: 940 [P.O.:880; Other:60]  Out: -   Admit Weight:  Weight: 73 kg (161 lb)  Weight Last 7 Days   [73 kg (161 lb)] 73 kg (161 lb) (06/11 1629)    Pain Issues:    Location:  Head (06/12 1316)  Anterior (06/12 1316)         Severity:  Moderate   Scheduled pain medications:  None     PRN pain medications used in last 24 hours:  oxycodone immediate release (ROXICODONE)    Non Pharmacologic Interventions:  distraction, environmental changes, repositioned and rest  Effectiveness of pain management plan:  good=patient states acceptable comfort after interventions    Bowel:    Bowel Assist Initial Score:     Bowel Assist Current Score:     Bowl Accidents in last 7 days:     Last bowel movement: 06/12/19 (per patient)        Usual bowel pattern:  daily  Scheduled bowel medications:  senna-docusate (PERICOLACE or SENOKOT S)   PRN bowel medications used in last 24 hours:  None  Nursing Interventions:     Effectiveness of bowel program:   good=regular, predictable, controlled emptying of bowel  Bladder:    Bladder Assist Initial Score:  4 - Minimal Assistance  Bladder Assist Current Score:  4 - Minimal Assistance  Bladder Accidents in last 7 days:     PVR range for past 24-48 hours:  [8-18]  ()  Intermittent Catheterization:  n/a  Medications affecting bladder:  None    Time void schedule/voiding pattern:  Voiding every 2-4 hours  Interventions:  Increased time, Supervision, Emptying of device, Urinal  Effectiveness of  bladder training:  Good=regular, predictable, emptying of bladder, patient initiates time voiding    [place]:    Type:     Patient Lines/Drains/Airways Status    Active Catheter     None              Date placed:          Justification:    Wound:         Patient Lines/Drains/Airways Status    Active Current Wounds     Name: Placement date: Placement time: Site: Days:    Wound 06/04/19 Head 06/04/19   1037      8                   Interventions:  PAKO  Effectiveness of intervention:  wound is unchanged       Sleep/wake cycle:   Average hours slept:  Sleeps 3-4 hours without waking  Sleep medication usage:  None    Patient/Family Training/Education:  Aspiration Precautions, Fall Prevention, Pain Management, Safe Transfers and Safety  Discharge Supply Recommendations:    Strengths: Alert and oriented, Able to follow instructions and Effective communication skills   Barriers:   Aspiration risk and Generalized weakness            Nursing Problems           Problem: Bowel/Gastric:     Goal: Normal bowel function is maintained or improved           Goal: Will not experience complications related to bowel motility             Problem: Communication     Goal: The ability to communicate needs accurately and effectively will improve             Problem: Discharge Barriers/Planning     Goal: Patient's continuum of care needs will be met             Problem: Infection     Goal: Will remain free from infection             Problem: Knowledge Deficit     Goal: Knowledge of disease process/condition, treatment plan, diagnostic tests, and medications will improve           Goal: Knowledge of the prescribed therapeutic regimen will improve             Problem: Pain Management     Goal: Pain level will decrease to patient's comfort goal     Flowsheet:     Taken at 06/13/19 0010    Pain Rating Scale (NPRS) 0 - No Pain by Sedrick Galvan RAfsanehNAfsaneh    Non Verbal Scale  Calm by Sedrick Galvan RAfsanehNAfsaneh    Comfort Goal Comfort at Rest;Sleep  Comfortably by Sedrick Galvan R.N.    Taken at 06/12/19 0032    Pain Rating Scale (NPRS) 7 - Focus of attention, prevents doing daily activities by Sedrick Galvan R.N.    Non Verbal Scale  Calm by Sedrick Galvan R.N.    Comfort Goal Comfort at Rest;Sleep Comfortably by Sedrick Galvan R.N.                  Problem: Safety     Goal: Will remain free from injury           Goal: Will remain free from falls             Problem: Venous Thromboembolism (VTW)/Deep Vein Thrombosis (DVT) Prevention:     Goal: Patient will participate in Venous Thrombosis (VTE)/Deep Vein Thrombosis (DVT)Prevention Measures                  Long Term Goals:   At discharge patient will be able to function safely at home and in the community with support.    Section completed by:  Sedrick Galvan R.N.

## 2019-06-13 NOTE — REHAB-PT IDT TEAM NOTE
"Physical Therapy   Mobility  Bed mobility:   SBA  Bed /Chair/Wheelchair Transfer Initial:  4 - Minimal Assistance  Bed /Chair/Wheelchair Transfer Current:  4 - Minimal Assistance   Bed/Chair/Wheelchair Transfer Description:   (SBA bed mobility, incr time required; CGA stand pivot w/o AD)  Walk Initial:  4 - Minimal Assistance  Walk Current:  4 - Minimal Assistance   Walk Description:   (CGA x200 ft x3 reps w/o AD, slow pace. Further distance limited by fatigue from headache and vision difficulties. )  Wheelchair Initial:  5 - Standby Prompting/Supervision or Set-up  Wheelchair Current:  5 - Standby Prompting/Supervision or Set-up   Wheelchair Description:   (SPV with BUEs x150 ft, slow pace)  Stairs Initial:  4 - Minimal Assistance  Stairs Current: 4 - Minimal Assistance   Stairs Description:  (12 - 6\" steps with CGA, progressed to no handrails with recip pattern going up; step to with descent. )  Patient/Family Training/Education:  Initiated patient education  DME/DC Recommendations:  2 weeks  Strengths:  Patient lives with his family in a single level home with one entry step. PLOF INDEP, community amb w/o AD  Barriers:   decreased activity tolerance, impaired vision, and impaired balance  # of short term goals set= 3 goals set yesterday with PT eval  # of short term goals met= 0 since yesterday; will monitor pt's progress towards goals       Physical Therapy Problems           Problem: Mobility     Dates: Start: 06/12/19       Goal: STG-Within one week, patient will ambulate community distances     Dates: Start: 06/12/19       Description: 1) Individualized goal: x200 ft with SPV, no AD.   2) Interventions:  PT Group Therapy, PT E Stim Attended, PT Gait Training, PT Self Care/Home Eval, PT Therapeutic Exercises, PT TENS Application, PT Neuro Re-Ed/Balance, PT Aquatic Therapy, PT Therapeutic Activity, PT Manual Therapy and PT Evaluation       Note:     Goal Note filed on 06/13/19 0858 by Rubio Glynn, PT " "   Goal: STG-Within one week, patient will ambulate community distances  Outcome: NOT MET  The PT eval was completed yesterday.  We will monitor pt's progress   towards his goals this week.                Goal: STG-Within one week, patient will ambulate up/down flight of stairs     Dates: Start: 06/12/19       Description: 1) Individualized goal: 12 - 6\" steps with SPV w/o use of handrails.   2) Interventions:  PT Group Therapy, PT E Stim Attended, PT Gait Training, PT Self Care/Home Eval, PT Therapeutic Exercises, PT TENS Application, PT Neuro Re-Ed/Balance, PT Aquatic Therapy, PT Therapeutic Activity, PT Manual Therapy and PT Evaluation         Note:     Goal Note filed on 06/13/19 0858 by Rubio Glynn, PT    Goal: STG-Within one week, patient will ambulate up/down flight of stairs  Outcome: NOT MET  The PT eval was completed yesterday.  We will monitor pt's progress   towards his goals this week.                  Problem: Mobility Transfers     Dates: Start: 06/12/19       Goal: STG-Within one week, patient will transfer bed to chair     Dates: Start: 06/12/19       Description: 1) Individualized goal: with SPV w/o AD.    2) Interventions:  PT Group Therapy, PT E Stim Attended, PT Gait Training, PT Self Care/Home Eval, PT Therapeutic Exercises, PT TENS Application, PT Neuro Re-Ed/Balance, PT Aquatic Therapy, PT Therapeutic Activity, PT Manual Therapy and PT Evaluation         Note:     Goal Note filed on 06/13/19 0858 by Rubio Glynn, PT    Goal: STG-Within one week, patient will transfer bed to chair  Outcome: NOT MET  The PT eval was completed yesterday.  We will monitor pt's progress   towards his goals this week.                  Problem: PT-Long Term Goals     Dates: Start: 06/12/19       Goal: LTG-By discharge, patient will ambulate     Dates: Start: 06/12/19       Description: 1) Individualized goal: x500 ft indoors and outdoors over uneven surfaces, MOD INDEP w/o AD for mobility in " "community.   2) Interventions:  PT Group Therapy, PT E Stim Attended, PT Gait Training, PT Self Care/Home Eval, PT Therapeutic Exercises, PT TENS Application, PT Neuro Re-Ed/Balance, PT Aquatic Therapy, PT Therapeutic Activity, PT Manual Therapy and PT Evaluation               Goal: LTG-By discharge, patient will transfer one surface to another     Dates: Start: 06/12/19       Description: 1) Individualized goal: MOD INDEP bed<>chair, no AD.  2) Interventions:  PT Group Therapy, PT E Stim Attended, PT Gait Training, PT Self Care/Home Eval, PT Therapeutic Exercises, PT TENS Application, PT Neuro Re-Ed/Balance, PT Aquatic Therapy, PT Therapeutic Activity, PT Manual Therapy and PT Evaluation               Goal: LTG-By discharge, patient will ambulate up/down flight of stairs     Dates: Start: 06/12/19       Description: 1) Individualized goal: 12- 6\" steps MOD INDEP w/o use of handrails for increased community mobility.  2) Interventions:  PT Group Therapy, PT E Stim Attended, PT Gait Training, PT Self Care/Home Eval, PT Therapeutic Exercises, PT TENS Application, PT Neuro Re-Ed/Balance, PT Aquatic Therapy, PT Therapeutic Activity, PT Manual Therapy and PT Evaluation               Goal: LTG-By discharge, patient will     Dates: Start: 06/12/19                     Section completed by:  Rubio Glynn, PT     "

## 2019-06-13 NOTE — CARE PLAN
Problem: Infection  Goal: Will remain free from infection  Pt. had a slight elevated temperature today. Physician was notified.  0700 100 °F  0900 98.7 °F  1400 99.1 °F  Will continue to monitor.     Problem: Pain Management  Goal: Pain level will decrease to patient's comfort goal  Pt. c/o 6-8/10 headache during this shift. PRN Fioricet was given as ordered.

## 2019-06-13 NOTE — THERAPY
Physical Therapy   Initial Evaluation     Patient Name: Rehab Tuesday  Age:  43 y.o., Sex:  male  Medical Record #: [id number]  Today's Date: 6/12/2019     Subjective    Patient sleeping upon arrival, slow to awaken. Agreeable to PT after fully awake. Reports headache, 5/10. Reports fatigue from earlier therapy sessions.      Objective       06/12/19 1301   Prior Living Situation   Prior Services Home-Independent   Housing / Facility 1 Story House   Steps Into Home 1   Steps In Home 0   Equipment Owned None   Lives with - Patient's Self Care Capacity Parents;Child Less than 18 Years of Age  (13 y/o daughter)   Prior Level of Functional Mobility   Bed Mobility Independent   Transfer Status Independent   Ambulation Independent   Distance Ambulation (Feet) (community)   Assistive Devices Used None   Stairs Independent   IRF-DARLYN:  Prior Functioning: Everyday Activities   Self Care Independent   Indoor Mobility (Ambulation) Independent   Stairs Independent   Functional Cognition Independent   Prior Device Use None of the given options   Pain 0 - 10 Group   Therapist Pain Assessment 5  (headache; nsg gave meds at start of session)   Cognition    Comments Pleasant and agreeable, no deficits noted during PT session.    Passive ROM Lower Body   Passive ROM Lower Body WDL   Active ROM Lower Body    Active ROM Lower Body  WDL   Strength Lower Body   Lower Body Strength  WDL   Comments 5/5 in major mm groups   Sensation Lower Body   Lower Extremity Sensation   WDL   Lower Body Muscle Tone   Lower Body Muscle Tone  WDL   Balance Assessment   Sitting Balance (Static) Good   Sitting Balance (Dynamic) Fair +   Standing Balance (Static) Fair +   Standing Balance (Dynamic) Fair +   Weight Shift Sitting Good   Weight Shift Standing Good   Bed Mobility    Supine to Sit Stand by Assist   Sit to Supine Stand by Assist   Sit to Stand Stand by Assist   Scooting Stand by Assist   Coordination Lower Body    Coordination Lower Body  WDL    IRF-DARLYN:  Roll Left and Right   Assistance Needed Supervision   CARE Score 4   IRF-DARLYN:  Sit to Lying   Assistance Needed Supervision   CARE Score 4   IRF-DARLYN:  Lying to Sitting on Side of Bed   Assistance Needed Supervision   CARE Score 4   IRF-DARLYN:  Sit to Stand   Assistance Needed Supervision   CARE Score 4   IRF-DARLYN:  Chair/Bed-to-Chair Transfer   Assistance Needed Incidental touching   CARE Score 4   IRF-DARLYN:  Car Transfer   Assistance Needed Incidental touching   CARE Score 4   IRF DARLYN:  Walking   Does the Patient Walk? Yes   IRF DARLYN:  Walk 10 Feet   Assistance Needed Incidental touching   CARE Score 4   IRF-DARLYN:  Walk 50 Feet with Two Turns   Assistance Needed Incidental touching   CARE Score 4   IRF-DARLYN:  Walk 150 Feet   Assistance Needed Incidental touching   CARE Score 4   IRF DARLYN:  Walking 10 Feet on Uneven Surfaces   Assistance Needed Incidental touching   CARE Score 4   IRF DARLYN:  1 Step (Curb)   Assistance Needed Incidental touching   CARE Score 4   IRF-DARLYN:  4 Steps   Assistance Needed Incidental touching   CARE Score 4   IRF DARLYN:  12 Steps   Assistance Needed Incidental touching   CARE Score 4   IRF DARLYN:  Picking Up Object   Assistance Needed Incidental touching   CARE Score 4   IRF-DARLYN:  Wheel 50 Feet with Two Turns   Indicate the Type of Wheelchair or Scooter Used Manual   Assistance Needed Supervision   CARE Score 4   IRF-DARLYN:  Wheel 150 Feet   Indicate the Type of Wheelchair or Scooter Used Manual   Assistance Needed Supervision   CARE Score 4   Problem List    Problems Pain;Impaired Ambulation;Impaired Balance;Decreased Activity Tolerance;Impaired Vision   Precautions   Precautions Swallow Precautions ( See Comments);Fall Risk   Comments dyphagia 3 with thins, diplopia   Current Discharge Plan   Current Discharge Plan Return to Prior Living Situation   Interdisciplinary Plan of Care Collaboration   IDT Collaboration with  Nursing;Occupational Therapist   Patient Position at End of Therapy In  "Bed;Call Light within Reach;Tray Table within Reach;Phone within Reach   PT Total Time Spent   PT Individual Total Time Spent (Mins) 60   PT Charge Group   PT Therapeutic Activities 1   PT Evaluation PT Evaluation Mod       FIM Bed/Chair/Wheelchair Transfers Score: 4 - Minimal Assistance  Bed/Chair/Wheelchair Transfers Description:   (SBA bed mobility, incr time required; CGA stand pivot w/o AD)    FIM Walking Score:  4 - Minimal Assistance  Walking Description:   (CGA x200 ft x3 reps w/o AD, slow pace. Further distance limited by fatigue from headache and vision difficulties. )    FIM Wheelchair Score:  5 - Standby Prompting/Supervision or Set-up  Wheelchair Description:   (SPV with BUEs x150 ft, slow pace)    FIM Stairs Score:  4 - Minimal Assistance  Stairs Description:   (12 - 6\" steps with CGA, progressed to no handrails with recip pattern going up; step to with descent. )    Assessment  Patient is 43 y.o. male with a diagnosis of TBI.  Additional factors influencing patient status / progress (ie: cognitive factors, co-morbidities, social support, etc): Patient lives with his family in a single level home with one entry step. PLOF INDEP, community amb w/o AD. Patient presents today with decreased activity tolerance, impaired vision, and impaired balance.     Plan  Recommend Physical Therapy 30-60 minutes per day 5-7 days per week for 1-2 weeks for the following treatments:  PT Group Therapy, PT Gait Training, PT Self Care/Home Eval, PT Therapeutic Exercises, PT TENS Application, PT Neuro Re-Ed/Balance, PT Aquatic Therapy, PT Therapeutic Activity, PT Manual Therapy and PT Evaluation.    Goals:  Long term and short term goals have been discussed with patient and they are in agreement.         Physical Therapy Problems           Problem: Mobility     Dates: Start: 06/12/19       Goal: STG-Within one week, patient will ambulate community distances     Dates: Start: 06/12/19       Description: 1) Individualized " "goal: x200 ft with SPV, no AD.   2) Interventions:  PT Group Therapy, PT E Stim Attended, PT Gait Training, PT Self Care/Home Eval, PT Therapeutic Exercises, PT TENS Application, PT Neuro Re-Ed/Balance, PT Aquatic Therapy, PT Therapeutic Activity, PT Manual Therapy and PT Evaluation             Goal: STG-Within one week, patient will ambulate up/down flight of stairs     Dates: Start: 06/12/19       Description: 1) Individualized goal: 12 - 6\" steps with SPV w/o use of handrails.   2) Interventions:  PT Group Therapy, PT E Stim Attended, PT Gait Training, PT Self Care/Home Eval, PT Therapeutic Exercises, PT TENS Application, PT Neuro Re-Ed/Balance, PT Aquatic Therapy, PT Therapeutic Activity, PT Manual Therapy and PT Evaluation                 Problem: Mobility Transfers     Dates: Start: 06/12/19       Goal: STG-Within one week, patient will transfer bed to chair     Dates: Start: 06/12/19       Description: 1) Individualized goal: with SPV w/o AD.    2) Interventions:  PT Group Therapy, PT E Stim Attended, PT Gait Training, PT Self Care/Home Eval, PT Therapeutic Exercises, PT TENS Application, PT Neuro Re-Ed/Balance, PT Aquatic Therapy, PT Therapeutic Activity, PT Manual Therapy and PT Evaluation                 Problem: PT-Long Term Goals     Dates: Start: 06/12/19       Goal: LTG-By discharge, patient will ambulate     Dates: Start: 06/12/19       Description: 1) Individualized goal: x500 ft indoors and outdoors over uneven surfaces, MOD INDEP w/o AD for mobility in community.   2) Interventions:  PT Group Therapy, PT E Stim Attended, PT Gait Training, PT Self Care/Home Eval, PT Therapeutic Exercises, PT TENS Application, PT Neuro Re-Ed/Balance, PT Aquatic Therapy, PT Therapeutic Activity, PT Manual Therapy and PT Evaluation               Goal: LTG-By discharge, patient will transfer one surface to another     Dates: Start: 06/12/19       Description: 1) Individualized goal: MOD INDEP bed<>chair, no AD.  2) " "Interventions:  PT Group Therapy, PT E Stim Attended, PT Gait Training, PT Self Care/Home Eval, PT Therapeutic Exercises, PT TENS Application, PT Neuro Re-Ed/Balance, PT Aquatic Therapy, PT Therapeutic Activity, PT Manual Therapy and PT Evaluation               Goal: LTG-By discharge, patient will ambulate up/down flight of stairs     Dates: Start: 06/12/19       Description: 1) Individualized goal: 12- 6\" steps MOD INDEP w/o use of handrails for increased community mobility.  2) Interventions:  PT Group Therapy, PT E Stim Attended, PT Gait Training, PT Self Care/Home Eval, PT Therapeutic Exercises, PT TENS Application, PT Neuro Re-Ed/Balance, PT Aquatic Therapy, PT Therapeutic Activity, PT Manual Therapy and PT Evaluation               Goal: LTG-By discharge, patient will     Dates: Start: 06/12/19                 "

## 2019-06-13 NOTE — REHAB-OT IDT TEAM NOTE
Occupational Therapy   Activities of Daily Living  Eating Initial:  5 - Standby Prompting/Supervision or Set-up  Eating Current:  5 - Standby Prompting/Supervision or Set-up   Eating Description:  Increased time, Modified diet, Supervision for safety, Verbal cueing  Grooming Initial:  5 - Standby Prompting/Supervision or Set-up  Grooming Current:  5 - Standby Prompting/Supervision or Set-up   Grooming Description:  Supervision for safety (standing at sink for oral care)  Bathing Initial:  5 - Standby Prompting/Supervision or Set-up  Bathing Current:  5 - Standby Prompting/Supervision or Set-up   Bathing Description:  Tub bench, Hand held shower, Verbal cueing (seated on tub bench, close SBA for safety)  Upper Body Dressing Initial:  5 - Standby Prompting/Supervision or Set-up  Upper Body Dressing Current:  5 - Standby Prompting/Supervision or Set-up   Upper Body Dressing Description:  Set-up of equipment  Lower Body Dressing Initial:  4 - Minimal Assistance  Lower Body Dressing Current:  4 - Minimal Assistance   Lower Body Dressing Description:  4 - Minimal Assistance  Toileting Initial:  4 - Minimal Assistance  Toileting Current:  5 - Standby Prompting/Supervision or Set-up   Toileting Description:  Increased time, Supervision for safety  Toilet Transfer Initial:  4 - Minimal Assistance  Toilet Transfer Current:  4 - Minimal Assistance   Toilet Transfer Description:  4 - Minimal Assistance  Tub / Shower Transfer Initial:  4 - Minimal Assistance  Tub / Shower Transfer Current:  4 - Minimal Assistance   Tub / Shower Transfer Description:   (CGA stand pivot w/c > shower chair and to ambulate from shower chair to sink after shower)  IADL:  TBD  Family Training/Education:  TBD  DME/DC Recommendations:  TBD    Strengths:  Independent PLOF and Willingly participates in therapeutic activities  Barriers:  Decreased endurance, Generalized weakness, Limited mobility, Poor activity tolerance, Poor balance and Other: Pain,  Diplopia     # of short term goals set= 4    # of short term goals met= 0       Occupational Therapy Goals           Problem: Dressing     Dates: Start: 06/12/19       Goal: STG-Within one week, patient will dress LB     Dates: Start: 06/12/19       Description: 1) Individualized Goal:  With supervision  2) Interventions:  OT Group Therapy, OT Self Care/ADL, OT Cognitive Skill Dev, OT Community Reintegration, OT Manual Ther Technique, OT Neuro Re-Ed/Balance, OT Sensory Int Techniques, OT Therapeutic Activity, OT Evaluation and OT Therapeutic Exercise       Note:     Goal Note filed on 06/13/19 1222 by Dorothy Ko, Student    Goal: STG-Within one week, patient will dress LB  Outcome: NOT MET  Pt requires min A to dress LB                  Problem: Functional Transfers     Dates: Start: 06/12/19       Goal: STG-Within one week, patient will     Dates: Start: 06/12/19       Description: 1) Individualized Goal:  Ambulate to/from bathroom and complete toilet and shower transfers with supervision and use of least restrictive adaptive device  2) Interventions:  OT Group Therapy, OT Self Care/ADL, OT Cognitive Skill Dev, OT Community Reintegration, OT Manual Ther Technique, OT Neuro Re-Ed/Balance, OT Sensory Int Techniques, OT Therapeutic Activity, OT Evaluation and OT Therapeutic Exercise     Note:     Goal Note filed on 06/13/19 1222 by Dorothy Ko, Student    Goal: STG-Within one week, patient will  Outcome: NOT MET  Pt requires Min A to complete bathroom transfers                  Problem: IADL's     Dates: Start: 06/12/19       Goal: STG-Within one week, patient will prepare a meal     Dates: Start: 06/12/19       Description: 1) Individualized Goal:  With CGA for mobility, verbal cues as needed for organization and sequencing  2) Interventions:  OT Group Therapy, OT Self Care/ADL, OT Cognitive Skill Dev, OT Community Reintegration, OT Manual Ther Technique, OT Neuro Re-Ed/Balance, OT Sensory Int Techniques, OT  Therapeutic Activity, OT Evaluation and OT Therapeutic Exercise     Note:     Goal Note filed on 06/13/19 1222 by Dorothy Ko, Student    Goal: STG-Within one week, patient will prepare a meal  Outcome: NOT MET  To be completed                  Problem: OT Long Term Goals     Dates: Start: 06/12/19       Goal: LTG-By discharge, patient will complete basic self care tasks     Dates: Start: 06/12/19       Description: 1) Individualized Goal:  With  Modified independence  2) Interventions:  OT Group Therapy, OT Self Care/ADL, OT Cognitive Skill Dev, OT Community Reintegration, OT Manual Ther Technique, OT Neuro Re-Ed/Balance, OT Sensory Int Techniques, OT Therapeutic Activity, OT Evaluation and OT Therapeutic Exercise           Goal: LTG-By discharge, patient will perform bathroom transfers     Dates: Start: 06/12/19       Description: 1) Individualized Goal:  With  Modified independence  2) Interventions:  OT Group Therapy, OT Self Care/ADL, OT Cognitive Skill Dev, OT Community Reintegration, OT Manual Ther Technique, OT Neuro Re-Ed/Balance, OT Sensory Int Techniques, OT Therapeutic Activity, OT Evaluation and OT Therapeutic Exercise           Goal: LTG-By discharge, patient will complete basic home management     Dates: Start: 06/12/19       Description: 1) Individualized Goal:  With  Modified independence  2) Interventions:  OT Group Therapy, OT Self Care/ADL, OT Cognitive Skill Dev, OT Community Reintegration, OT Manual Ther Technique, OT Neuro Re-Ed/Balance, OT Sensory Int Techniques, OT Therapeutic Activity, OT Evaluation and OT Therapeutic Exercise             Problem: Toileting     Dates: Start: 06/12/19       Goal: STG-Within one week, patient will complete toileting tasks     Dates: Start: 06/12/19       Description: 1) Individualized Goal:  With supervision  2) Interventions:  OT Group Therapy, OT Self Care/ADL, OT Cognitive Skill Dev, OT Community Reintegration, OT Manual Ther Technique, OT Neuro  Re-Ed/Balance, OT Sensory Int Techniques, OT Therapeutic Activity, OT Evaluation and OT Therapeutic Exercise     Note:     Goal Note filed on 06/13/19 1222 by Dorothy Ko, Student    Goal: STG-Within one week, patient will complete toileting tasks  Outcome: NOT MET  Pt requires Min A to complete toileting tasks                      Section completed by:  Dorothy Ko, Student
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
## 2019-06-13 NOTE — REHAB-SLP IDT TEAM NOTE
Speech Therapy   Cognitive Linquistic Functions  Comprehension Initial:  5 - Stand-by Prompting/Supervision or Set-up  Comprehension Current:  5 - Stand-by Prompting/Supervision or Set-up   Comprehension Description:  Verbal cues  Expression Initial:  5 - Stand-by Prompting/Supervision or Set-up  Expression Current:  5 - Stand-by Prompting/Supervision or Set-up   Expression Description:  Verbal cueing  Social Interaction Initial:  4 - Minimal Assistance  Social Interaction Current:  4 - Minimal Assistance   Social Interaction Description:  Increased time, Verbal cues, Low stim environment  Problem Solving Initial:  4 - Minimal Assistance  Problem Solving Current:  4 - Minimal Assistance   Problem Solving Description:  Verbal cueing, Therapy schedule  Memory Initial:  3 - Moderate Assistance  Memory Current:  3 - Moderate Assistance   Memory Description:  Verbal cueing, Therapy schedule  Executive Functioning / Organization Initial:  To Be Assessed  Executive Functioning / Organization Current:  To Be Assessed   Executive Functioning Desciption:  TBD  Swallowing  Swallowing Status:  MBSS completed, pt tolerated thin liquids without aspiration/penetration and mild-moderate residue with mech soft textures that he was able to clear independently with a second swallow.  Pt currently does not have his dentures which causes difficulty when attempting to chew mech soft or regular textures.  Recommend pt remain on a dysphagia 2 diet with thin liquids until he has his dentures (pt's mother reported she would bring them this evening).    Orders Placed This Encounter   Procedures   • Diet Order Regular     Standing Status:   Standing     Number of Occurrences:   1     Order Specific Question:   Diet:     Answer:   Regular [1]     Order Specific Question:   Texture/Fiber modifications:     Answer:   Dysphagia 2(Pureed/Chopped)specify fluid consistency(question 6) [2]     Order Specific Question:   Consistency/Fluid  modifications:     Answer:   Thin Liquids [3]     Behavior Modification Plan  Allow for rest time, Keep instructions simple/concrete, Redirect to task/topic and Analyze tasks (break down into smaller steps)  Assistive Technology  Memory aides: and Low tech: Calendar, planner, schedule, alarms/timers, pill organizer, post-it notes, lists  Family Training/Education:  To be completed   DC Recommendations:   TBD  Strengths:  Able to follow instructions, Alert and oriented, Effective communication skills, Independent PLOF, Motivated for self care and independence, Pleasant and cooperative, Supportive family and Willingly participates in therapeutic activities  Barriers:  Pain poorly managed, Poor activity tolerance and Poor carryover of learning  # of short term goals set=3  # of short term goals met=1       Speech Therapy Problems           Problem: Problem Solving STGs     Dates: Start: 06/12/19       Goal: STG-Within one week, patient will     Dates: Start: 06/12/19       Description: 1) Individualized goal:  Continue administration of the SCCAN with goals added as appropriate   2) Interventions:  SLP Speech Language Treatment, SLP Self Care / ADL Training , SLP Cognitive Skill Development and SLP Group Treatment       Note:     Goal Note filed on 06/13/19 1140 by Ankit Kruger MS,CCC-SLP    Goal: STG-Within one week, patient will  Outcome: NOT MET  Pt recently evaluated, continue to target                 Goal: STG-Within one week, patient will     Dates: Start: 06/12/19       Description: 1) Individualized goal:  Achieve a score of 12/12 in 3 consecutive sessions on the [place] PTA scale   2) Interventions:  SLP Speech Language Treatment, SLP Self Care / ADL Training , SLP Cognitive Skill Development and SLP Group Treatment       Note:     Goal Note filed on 06/13/19 1140 by Ankit Kruger MS,CCC-SLP    Goal: STG-Within one week, patient will  Outcome: NOT MET  Pt recently evaluated, continue to target                    Problem: Speech/Swallowing LTGs     Dates: Start: 06/12/19       Goal: LTG-By discharge, patient will safely swallow     Dates: Start: 06/12/19       Description: 1) Individualized goal:  The least restrictive diet for safe intake of daily meals   2) Interventions:  SLP Swallowing Dysfunction Treatment, SLP Oral Pharyngeal Evaluation, SLP Video Swallow Evaluation and SLP Group Treatment             Goal: LTG-By discharge, patient will solve complex problem     Dates: Start: 06/12/19       Description: 1) Individualized goal:  Given spv for a safe discharge home  2) Interventions:  SLP Speech Language Treatment, SLP Self Care / ADL Training , SLP Cognitive Skill Development and SLP Group Treatment               Problem: Swallowing STGs     Dates: Start: 06/12/19       Goal: STG-Within one week, patient will     Dates: Start: 06/13/19       Description: 1) Individualized goal:  Tolerate trials of dysphagia 3 textures with dentures without difficulty .   2) Interventions:  SLP Swallowing Dysfunction Treatment, SLP Oral Pharyngeal Evaluation, SLP Video Swallow Evaluation and SLP Group Treatment                    Section completed by:  Ankit Kruger MS,CCC-SLP

## 2019-06-13 NOTE — THERAPY
Occupational Therapy  Daily Treatment     Patient Name: Rehab Tuesday  Age:  43 y.o., Sex:  male  Medical Record #: [id number]  Today's Date: 6/13/2019     Precautions  Precautions: Swallow Precautions ( See Comments), Fall Risk  Comments: dyphagia 3 with thins, diplopia         Subjective     Objective       06/13/19 1001   Precautions   Precautions Swallow Precautions ( See Comments);Fall Risk   Comments dyphagia 3 with thins, diplopia   Vitals   O2 Delivery None (Room Air)   Pain   Intervention Nurse Notified   Pain 0 - 10 Group   Location Head   Location Orientation Anterior;Right   Pain Rating Scale (NPRS) 8   Non Verbal Descriptors   Non Verbal Scale  Calm   Cognition    Level of Consciousness Alert   Vision Screen   Tracking L eye does not track laterally  (Does not laterally from midline to left )   Convergence Severe deficit (> 10 from nose)   Visual Fields Difficulty detecting stimulus with left eye in left upper quadrant;Difficulty detecting stimulus with left eye in left lower quadrant;Difficulty detecting stimulus with left eye in left lateral quadrant   Visual Screen Results Diplopia;Ocular motor dysfunction   Sitting Upper Body Exercises   Upper Extremity Bike Level 2 Resistance   Comments Sitting at [employer], level 2, 15 mins, 2.803km   D[employer]   Patient Position Standing   Application Visual-motor integration;Visual-spatial integration;Laterality/directionality   Mode A   Time per trial (sec) 60   Quadrants LUQ;LLQ;RUQ;RLQ   Number of Hits (No patch: 29, Patch on L eye: 27)   Average Reaction Time (No patch: 2:07 secs, Patch on L eye: 2:22 secs)   Clinical Observations Other  (L lower and upper quadrants- decreased rxn time)   Interdisciplinary Plan of Care Collaboration   IDT Collaboration with  Nursing;Certified Nursing Assistant   Patient Position at End of Therapy In Bed;Call Light within Reach;Tray Table within Reach;Phone within Reach   Collaboration Comments Consulted nursing for  pain/medications and transition of care     Blocks on wall activity - pt standing via CGA and instructed to grab colored blocks on wall. Pt required to scan 45 degrees in all directions to find the blocks and place. Noted 100% accuracy, but required increased time. Pt then instructed to return blocks. Noted over and under shooting for placement of blocks.     Letter and number cards on wall - pt standing via CGA and instructed to grab letter cards (10) and place in alphabetical order. Pt completed in 1 min. Pt then instructed to grab number cards (10) and place in numerical order. Pt completed in 48 secs. No VQs required for 100% accuracy, no LOB.      FIM Toiletin - Standby Prompting/Supervision or Set-up  Toileting Description:  Increased time, Supervision for safety    FIM Toilet Transfer Score:  4 - Minimal Assistance  Toilet Transfer Description:  Increased time, Supervision for safety (CGA for SPT to/from wheelchair to toilet)      Assessment    Pt alert and cooperative w/ tx session. Noted pt has decreased mobility in L eye when scanning to L from midline. Pt required increased time to scan objects but required no VQs. Limiters include diplopia, endurance, and balance.     Plan    Cont OT to address balance, endurance, and vision for completion of ADLs/IADLs

## 2019-06-13 NOTE — CARE PLAN
Problem: Swallowing STGs  Goal: STG-Within one week, patient will  1) Individualized goal:  Participate in an MBSS  2) Interventions:  SLP Swallowing Dysfunction Treatment, SLP Oral Pharyngeal Evaluation and SLP Video Swallow Evaluation     Outcome: MET Date Met: 06/13/19  MBSS completed     Problem: Problem Solving STGs  Goal: STG-Within one week, patient will  1) Individualized goal:  Continue administration of the SCCAN with goals added as appropriate   2) Interventions:  SLP Speech Language Treatment, SLP Self Care / ADL Training , SLP Cognitive Skill Development and SLP Group Treatment     Outcome: NOT MET  Pt recently evaluated, continue to target   Goal: STG-Within one week, patient will  1) Individualized goal:  Achieve a score of 12/12 in 3 consecutive sessions on the [place] PTA scale   2) Interventions:  SLP Speech Language Treatment, SLP Self Care / ADL Training , SLP Cognitive Skill Development and SLP Group Treatment     Outcome: NOT MET  Pt recently evaluated, continue to target

## 2019-06-13 NOTE — THERAPY
Missed Therapy     Patient Name: Rehab Tuesday  Age:  43 y.o., Sex:  male  Medical Record #: [id number]  Today's Date: 6/13/2019 06/13/19 1231   Therapy Missed   Missed Therapy (Minutes) 60   Reason For Missed Therapy Medical - Patient not Able To Participate;Medical - Patient Unarousable  (Attempted to see patient at 1230 and 1235; pt unable due to sleep, HA, mental fatigue.  Makeup scheduled for [id number] after SLP this afternoon.  Therapy scheduling aware.)     Plan     Standing balance, increasing environmental stimulus, monitor sleep/wake prn, monitor HR with increase in CV endurance activities, ther ex, and education.

## 2019-06-13 NOTE — REHAB-CM IDT TEAM NOTE
Case Management      DC Planning    DC destination/dispostion:  Home w/ supportive family.    Referrals: TBD: worker's comp following    DC Needs: DME for patient safety & mobility. OP therapies. MD f/u appts. TBI education. Family training.    Barriers to discharge: Funcitional mobility deficits. Cognitive deficits.     Strengths: Motivation. Age. PLOF: independent, works FT, drives.    Section completed by:  Mervat Rivas R.N.

## 2019-06-13 NOTE — THERAPY
Speech Language Pathology  Daily Treatment     Patient Name: Rehab Tuesday  Age:  43 y.o., Sex:  male  Medical Record #: [id number]  Today's Date: 6/13/2019     Subjective    Pt with c/o headache, RN informed and meds provided.      Objective       06/13/19 1433   WPTAS ([place] Post-traumatic Amnesia Scale)   How old are you? 1   What is your date of birth? 1   What month are we in? 1   What time of day is it? (morning, noon, or night) 1   What day of the week is it? 1   What year are we in? 1   What is the name of this place 1   Do you remember me? 1   What is my name? 1   Target Picture 1 1   Target Picture 2 1   Target Picture 3 0   Orientation Score 7   Recall Score 4   Total Score 11   Still in post-traumatic amnesia? Yes   SLP Total Time Spent   SLP Individual Total Time Spent (Mins) 30   Charge Group   SLP Cognitive Skill Development 2       Assessment    [place] administered with score of 11 achieved on this date, pt indep recalled 2/3 picture cards. SCCAN was continued however unable to be completed, test to be completed in future session with results to follow and additional goals as appropriate.     Plan    Cont SCCAN

## 2019-06-13 NOTE — CARE PLAN
Problem: Dressing  Goal: STG-Within one week, patient will dress LB  1) Individualized Goal:  With supervision  2) Interventions:  OT Group Therapy, OT Self Care/ADL, OT Cognitive Skill Dev, OT Community Reintegration, OT Manual Ther Technique, OT Neuro Re-Ed/Balance, OT Sensory Int Techniques, OT Therapeutic Activity, OT Evaluation and OT Therapeutic Exercise     Outcome: NOT MET  Pt requires min A to dress LB    Problem: Toileting  Goal: STG-Within one week, patient will complete toileting tasks  1) Individualized Goal:  With supervision  2) Interventions:  OT Group Therapy, OT Self Care/ADL, OT Cognitive Skill Dev, OT Community Reintegration, OT Manual Ther Technique, OT Neuro Re-Ed/Balance, OT Sensory Int Techniques, OT Therapeutic Activity, OT Evaluation and OT Therapeutic Exercise   Outcome: NOT MET  Pt requires Min A to complete toileting tasks    Problem: Functional Transfers  Goal: STG-Within one week, patient will  1) Individualized Goal:  Ambulate to/from bathroom and complete toilet and shower transfers with supervision and use of least restrictive adaptive device  2) Interventions:  OT Group Therapy, OT Self Care/ADL, OT Cognitive Skill Dev, OT Community Reintegration, OT Manual Ther Technique, OT Neuro Re-Ed/Balance, OT Sensory Int Techniques, OT Therapeutic Activity, OT Evaluation and OT Therapeutic Exercise   Outcome: NOT MET  Pt requires Min A to complete bathroom transfers    Problem: IADL's  Goal: STG-Within one week, patient will prepare a meal  1) Individualized Goal:  With CGA for mobility, verbal cues as needed for organization and sequencing  2) Interventions:  OT Group Therapy, OT Self Care/ADL, OT Cognitive Skill Dev, OT Community Reintegration, OT Manual Ther Technique, OT Neuro Re-Ed/Balance, OT Sensory Int Techniques, OT Therapeutic Activity, OT Evaluation and OT Therapeutic Exercise   Outcome: NOT MET  To be completed

## 2019-06-13 NOTE — REHAB-COLLABORATIVE ONGOING IDT TEAM NOTE
Weekly Interdisciplinary Team Conference Note    Weekly Interdisciplinary Team Conference # 1  Date:  6/13/2019    Clinicians present and reporting at team conference include the following:   MD: PHYLLIS New MD    RN:  Lesly Bustos RN    PT:   Jeremiah Glynn, PT, DPT  OT:  Angel Chandra MS OTR/L    ST:  Ankit Kruger MS, CCC-SLP  CM:  Mervat Rivas RN, CCM  REC:  Jimmy Sanderson, AMRÍAS  RT:  Not Applicable  RPh:  Hammad Childers [facility]   Other: Krishna Frias workers comp  via conference call  All reporting clinicians have a working knowledge of this patient's plan of care.    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     Priority: High   • Oropharyngeal dysphagia 06/06/2019     Priority: Medium   • Skull fracture (HCC) 06/04/2019     Priority: Medium   • Multiple facial fractures, open, initial encounter (HCC) 06/04/2019     Priority: Medium   • Facial laceration 06/11/2019     Priority: Low   • Bilateral pulmonary contusion 06/04/2019     Priority: Low   • Lung nodule 06/04/2019     Priority: Low   • Traumatic brain injury (HCC) 06/11/2019     Results     Procedure Component Value Ref Range Date/Time    HEMOGLOBIN A1C [[id number]] Collected:  06/12/19 0505    Order Status:  Completed Lab Status:  Final result Updated:  06/12/19 1333    Specimen:  Blood      Glycohemoglobin 5.5 0.0 - 5.6 %      Comment: Increased risk for diabetes:  5.7 -6.4%  Diabetes:  >6.4%  Glycemic control for adults with diabetes:  <7.0%  The above interpretations are per ADA guidelines.  Diagnosis  of diabetes mellitus on the basis of elevated Hemoglobin A1c  should be confirmed by repeating the Hb A1c test.          Est Avg Glucose 111 mg/dL      Comment: The eAG calculation is based on the A1c-Derived Daily Glucose  (ADAG) study.  See the ADA's website for additional  information.                Nursing  Diet/Nutrition:  Dysphagia II and Thin Liquids  Medication Administration:  Float Whole with Puree  % consumed at meals in last 24 hours:  Consumed % of meals per documentation.  Meal/Snack Consumption for the past 24 hrs:   Oral Nutrition   06/12/19 1800 Between % Consumed;Dinner   06/12/19 1602 Lunch;Between 50-75% Consumed   06/12/19 0945 Breakfast;Between % Consumed       Snack schedule:  None  Supplement:  n/a  Appetite:  Good  Fluid Intake/Output in past 24 hours: In: 940 [P.O.:880; Other:60]  Out: -   Admit Weight:  Weight: 73 kg (161 lb)  Weight Last 7 Days   [73 kg (161 lb)] 73 kg (161 lb) (06/11 1629)    Pain Issues:    Location:  Head (06/12 1316)  Anterior (06/12 1316)         Severity:  Moderate   Scheduled pain medications:  None     PRN pain medications used in last 24 hours:  oxycodone immediate release (ROXICODONE)    Non Pharmacologic Interventions:  distraction, environmental changes, repositioned and rest  Effectiveness of pain management plan:  good=patient states acceptable comfort after interventions    Bowel:    Bowel Assist Initial Score:     Bowel Assist Current Score:     Bowl Accidents in last 7 days:     Last bowel movement: 06/12/19 (per patient)        Usual bowel pattern:  daily  Scheduled bowel medications:  senna-docusate (PERICOLACE or SENOKOT S)   PRN bowel medications used in last 24 hours:  None  Nursing Interventions:     Effectiveness of bowel program:   good=regular, predictable, controlled emptying of bowel  Bladder:    Bladder Assist Initial Score:  4 - Minimal Assistance  Bladder Assist Current Score:  4 - Minimal Assistance  Bladder Accidents in last 7 days:     PVR range for past 24-48 hours:  [8-18]  ()  Intermittent Catheterization:  n/a  Medications affecting bladder:  None    Time void schedule/voiding pattern:  Voiding every 2-4 hours  Interventions:  Increased time, Supervision, Emptying of device,  Urinal  Effectiveness of bladder training:  Good=regular, predictable, emptying of bladder, patient initiates time voiding    [place]:    Type:     Patient Lines/Drains/Airways Status    Active Catheter     None              Date placed:          Justification:    Wound:         Patient Lines/Drains/Airways Status    Active Current Wounds     Name: Placement date: Placement time: Site: Days:    Wound 06/04/19 Head 06/04/19   1037      8                   Interventions:  PAKO  Effectiveness of intervention:  wound is unchanged       Sleep/wake cycle:   Average hours slept:  Sleeps 3-4 hours without waking  Sleep medication usage:  None    Patient/Family Training/Education:  Aspiration Precautions, Fall Prevention, Pain Management, Safe Transfers and Safety  Discharge Supply Recommendations:    Strengths: Alert and oriented, Able to follow instructions and Effective communication skills   Barriers:   Aspiration risk and Generalized weakness            Nursing Problems           Problem: Bowel/Gastric:     Goal: Normal bowel function is maintained or improved           Goal: Will not experience complications related to bowel motility             Problem: Communication     Goal: The ability to communicate needs accurately and effectively will improve             Problem: Discharge Barriers/Planning     Goal: Patient's continuum of care needs will be met             Problem: Infection     Goal: Will remain free from infection             Problem: Knowledge Deficit     Goal: Knowledge of disease process/condition, treatment plan, diagnostic tests, and medications will improve           Goal: Knowledge of the prescribed therapeutic regimen will improve             Problem: Pain Management     Goal: Pain level will decrease to patient's comfort goal     Flowsheet:     Taken at 06/13/19 0010    Pain Rating Scale (NPRS) 0 - No Pain by Sedrick Galvan RSHERIN    Non Verbal Scale  Calm by Sedrick Galvan R.N.    Comfort Goal  "Comfort at Rest;Sleep Comfortably by Sedrick Galvan R.N.    Taken at 06/12/19 0032    Pain Rating Scale (NPRS) 7 - Focus of attention, prevents doing daily activities by Sedrick Galvan R.N.    Non Verbal Scale  Calm by Sedrick Galvan R.N.    Comfort Goal Comfort at Rest;Sleep Comfortably by Sedrick Galvan R.N.                  Problem: Safety     Goal: Will remain free from injury           Goal: Will remain free from falls             Problem: Venous Thromboembolism (VTW)/Deep Vein Thrombosis (DVT) Prevention:     Goal: Patient will participate in Venous Thrombosis (VTE)/Deep Vein Thrombosis (DVT)Prevention Measures                  Long Term Goals:   At discharge patient will be able to function safely at home and in the community with support.    Section completed by:  Sedrick Galvan R.N.              Mobility  Bed mobility:   SBA  Bed /Chair/Wheelchair Transfer Initial:  4 - Minimal Assistance  Bed /Chair/Wheelchair Transfer Current:  4 - Minimal Assistance   Bed/Chair/Wheelchair Transfer Description:   (SBA bed mobility, incr time required; CGA stand pivot w/o AD)  Walk Initial:  4 - Minimal Assistance  Walk Current:  4 - Minimal Assistance   Walk Description:   (CGA x200 ft x3 reps w/o AD, slow pace. Further distance limited by fatigue from headache and vision difficulties. )  Wheelchair Initial:  5 - Standby Prompting/Supervision or Set-up  Wheelchair Current:  5 - Standby Prompting/Supervision or Set-up   Wheelchair Description:   (SPV with BUEs x150 ft, slow pace)  Stairs Initial:  4 - Minimal Assistance  Stairs Current: 4 - Minimal Assistance   Stairs Description:  (12 - 6\" steps with CGA, progressed to no handrails with recip pattern going up; step to with descent. )  Patient/Family Training/Education:  Initiated patient education  DME/DC Recommendations:  2 weeks  Strengths:  Patient lives with his family in a single level home with one entry step. PLOF INDEP, community amb w/o " "AD  Barriers:   decreased activity tolerance, impaired vision, and impaired balance  # of short term goals set= 3 goals set yesterday with PT eval  # of short term goals met= 0 since yesterday; will monitor pt's progress towards goals       Physical Therapy Problems           Problem: Mobility     Dates: Start: 06/12/19       Goal: STG-Within one week, patient will ambulate community distances     Dates: Start: 06/12/19       Description: 1) Individualized goal: x200 ft with SPV, no AD.   2) Interventions:  PT Group Therapy, PT E Stim Attended, PT Gait Training, PT Self Care/Home Eval, PT Therapeutic Exercises, PT TENS Application, PT Neuro Re-Ed/Balance, PT Aquatic Therapy, PT Therapeutic Activity, PT Manual Therapy and PT Evaluation       Note:     Goal Note filed on 06/13/19 0858 by Rubio Glynn, PT    Goal: STG-Within one week, patient will ambulate community distances  Outcome: NOT MET  The PT eval was completed yesterday.  We will monitor pt's progress   towards his goals this week.                Goal: STG-Within one week, patient will ambulate up/down flight of stairs     Dates: Start: 06/12/19       Description: 1) Individualized goal: 12 - 6\" steps with SPV w/o use of handrails.   2) Interventions:  PT Group Therapy, PT E Stim Attended, PT Gait Training, PT Self Care/Home Eval, PT Therapeutic Exercises, PT TENS Application, PT Neuro Re-Ed/Balance, PT Aquatic Therapy, PT Therapeutic Activity, PT Manual Therapy and PT Evaluation         Note:     Goal Note filed on 06/13/19 0858 by Rubio Glynn, PT    Goal: STG-Within one week, patient will ambulate up/down flight of stairs  Outcome: NOT MET  The PT eval was completed yesterday.  We will monitor pt's progress   towards his goals this week.                  Problem: Mobility Transfers     Dates: Start: 06/12/19       Goal: STG-Within one week, patient will transfer bed to chair     Dates: Start: 06/12/19       Description: 1) Individualized " "goal: with SPV w/o AD.    2) Interventions:  PT Group Therapy, PT E Stim Attended, PT Gait Training, PT Self Care/Home Eval, PT Therapeutic Exercises, PT TENS Application, PT Neuro Re-Ed/Balance, PT Aquatic Therapy, PT Therapeutic Activity, PT Manual Therapy and PT Evaluation         Note:     Goal Note filed on 06/13/19 0858 by Rubio Glynn, PT    Goal: STG-Within one week, patient will transfer bed to chair  Outcome: NOT MET  The PT eval was completed yesterday.  We will monitor pt's progress   towards his goals this week.                  Problem: PT-Long Term Goals     Dates: Start: 06/12/19       Goal: LTG-By discharge, patient will ambulate     Dates: Start: 06/12/19       Description: 1) Individualized goal: x500 ft indoors and outdoors over uneven surfaces, MOD INDEP w/o AD for mobility in community.   2) Interventions:  PT Group Therapy, PT E Stim Attended, PT Gait Training, PT Self Care/Home Eval, PT Therapeutic Exercises, PT TENS Application, PT Neuro Re-Ed/Balance, PT Aquatic Therapy, PT Therapeutic Activity, PT Manual Therapy and PT Evaluation               Goal: LTG-By discharge, patient will transfer one surface to another     Dates: Start: 06/12/19       Description: 1) Individualized goal: MOD INDEP bed<>chair, no AD.  2) Interventions:  PT Group Therapy, PT E Stim Attended, PT Gait Training, PT Self Care/Home Eval, PT Therapeutic Exercises, PT TENS Application, PT Neuro Re-Ed/Balance, PT Aquatic Therapy, PT Therapeutic Activity, PT Manual Therapy and PT Evaluation               Goal: LTG-By discharge, patient will ambulate up/down flight of stairs     Dates: Start: 06/12/19       Description: 1) Individualized goal: 12- 6\" steps MOD INDEP w/o use of handrails for increased community mobility.  2) Interventions:  PT Group Therapy, PT E Stim Attended, PT Gait Training, PT Self Care/Home Eval, PT Therapeutic Exercises, PT TENS Application, PT Neuro Re-Ed/Balance, PT Aquatic Therapy, PT " Therapeutic Activity, PT Manual Therapy and PT Evaluation               Goal: LTG-By discharge, patient will     Dates: Start: 06/12/19                     Section completed by:  Rubio Glynn, PT       Activities of Daily Living  Eating Initial:  5 - Standby Prompting/Supervision or Set-up  Eating Current:  5 - Standby Prompting/Supervision or Set-up   Eating Description:  Increased time, Modified diet, Supervision for safety, Verbal cueing  Grooming Initial:  5 - Standby Prompting/Supervision or Set-up  Grooming Current:  5 - Standby Prompting/Supervision or Set-up   Grooming Description:  Supervision for safety (standing at sink for oral care)  Bathing Initial:  5 - Standby Prompting/Supervision or Set-up  Bathing Current:  5 - Standby Prompting/Supervision or Set-up   Bathing Description:  Tub bench, Hand held shower, Verbal cueing (seated on tub bench, close SBA for safety)  Upper Body Dressing Initial:  5 - Standby Prompting/Supervision or Set-up  Upper Body Dressing Current:  5 - Standby Prompting/Supervision or Set-up   Upper Body Dressing Description:  Set-up of equipment  Lower Body Dressing Initial:  4 - Minimal Assistance  Lower Body Dressing Current:  4 - Minimal Assistance   Lower Body Dressing Description:  4 - Minimal Assistance  Toileting Initial:  4 - Minimal Assistance  Toileting Current:  5 - Standby Prompting/Supervision or Set-up   Toileting Description:  Increased time, Supervision for safety  Toilet Transfer Initial:  4 - Minimal Assistance  Toilet Transfer Current:  4 - Minimal Assistance   Toilet Transfer Description:  4 - Minimal Assistance  Tub / Shower Transfer Initial:  4 - Minimal Assistance  Tub / Shower Transfer Current:  4 - Minimal Assistance   Tub / Shower Transfer Description:   (CGA stand pivot w/c > shower chair and to ambulate from shower chair to sink after shower)  IADL:  TBD  Family Training/Education:  TBD  DME/DC Recommendations:  TBD    Strengths:  Independent  PLOF and Willingly participates in therapeutic activities  Barriers:  Decreased endurance, Generalized weakness, Limited mobility, Poor activity tolerance, Poor balance and Other: Pain, Diplopia     # of short term goals set= 4    # of short term goals met= 0       Occupational Therapy Goals           Problem: Dressing     Dates: Start: 06/12/19       Goal: STG-Within one week, patient will dress LB     Dates: Start: 06/12/19       Description: 1) Individualized Goal:  With supervision  2) Interventions:  OT Group Therapy, OT Self Care/ADL, OT Cognitive Skill Dev, OT Community Reintegration, OT Manual Ther Technique, OT Neuro Re-Ed/Balance, OT Sensory Int Techniques, OT Therapeutic Activity, OT Evaluation and OT Therapeutic Exercise       Note:     Goal Note filed on 06/13/19 1222 by Dorothy Ko, Student    Goal: STG-Within one week, patient will dress LB  Outcome: NOT MET  Pt requires min A to dress LB                  Problem: Functional Transfers     Dates: Start: 06/12/19       Goal: STG-Within one week, patient will     Dates: Start: 06/12/19       Description: 1) Individualized Goal:  Ambulate to/from bathroom and complete toilet and shower transfers with supervision and use of least restrictive adaptive device  2) Interventions:  OT Group Therapy, OT Self Care/ADL, OT Cognitive Skill Dev, OT Community Reintegration, OT Manual Ther Technique, OT Neuro Re-Ed/Balance, OT Sensory Int Techniques, OT Therapeutic Activity, OT Evaluation and OT Therapeutic Exercise     Note:     Goal Note filed on 06/13/19 1222 by Dorothy Ko, Student    Goal: STG-Within one week, patient will  Outcome: NOT MET  Pt requires Min A to complete bathroom transfers                  Problem: IADL's     Dates: Start: 06/12/19       Goal: STG-Within one week, patient will prepare a meal     Dates: Start: 06/12/19       Description: 1) Individualized Goal:  With CGA for mobility, verbal cues as needed for organization and sequencing  2)  Interventions:  OT Group Therapy, OT Self Care/ADL, OT Cognitive Skill Dev, OT Community Reintegration, OT Manual Ther Technique, OT Neuro Re-Ed/Balance, OT Sensory Int Techniques, OT Therapeutic Activity, OT Evaluation and OT Therapeutic Exercise     Note:     Goal Note filed on 06/13/19 1222 by Dorothy Ko, Student    Goal: STG-Within one week, patient will prepare a meal  Outcome: NOT MET  To be completed                  Problem: OT Long Term Goals     Dates: Start: 06/12/19       Goal: LTG-By discharge, patient will complete basic self care tasks     Dates: Start: 06/12/19       Description: 1) Individualized Goal:  With  Modified independence  2) Interventions:  OT Group Therapy, OT Self Care/ADL, OT Cognitive Skill Dev, OT Community Reintegration, OT Manual Ther Technique, OT Neuro Re-Ed/Balance, OT Sensory Int Techniques, OT Therapeutic Activity, OT Evaluation and OT Therapeutic Exercise           Goal: LTG-By discharge, patient will perform bathroom transfers     Dates: Start: 06/12/19       Description: 1) Individualized Goal:  With  Modified independence  2) Interventions:  OT Group Therapy, OT Self Care/ADL, OT Cognitive Skill Dev, OT Community Reintegration, OT Manual Ther Technique, OT Neuro Re-Ed/Balance, OT Sensory Int Techniques, OT Therapeutic Activity, OT Evaluation and OT Therapeutic Exercise           Goal: LTG-By discharge, patient will complete basic home management     Dates: Start: 06/12/19       Description: 1) Individualized Goal:  With  Modified independence  2) Interventions:  OT Group Therapy, OT Self Care/ADL, OT Cognitive Skill Dev, OT Community Reintegration, OT Manual Ther Technique, OT Neuro Re-Ed/Balance, OT Sensory Int Techniques, OT Therapeutic Activity, OT Evaluation and OT Therapeutic Exercise             Problem: Toileting     Dates: Start: 06/12/19       Goal: STG-Within one week, patient will complete toileting tasks     Dates: Start: 06/12/19       Description: 1)  Individualized Goal:  With supervision  2) Interventions:  OT Group Therapy, OT Self Care/ADL, OT Cognitive Skill Dev, OT Community Reintegration, OT Manual Ther Technique, OT Neuro Re-Ed/Balance, OT Sensory Int Techniques, OT Therapeutic Activity, OT Evaluation and OT Therapeutic Exercise     Note:     Goal Note filed on 06/13/19 1222 by Dorothy Ko, Student    Goal: STG-Within one week, patient will complete toileting tasks  Outcome: NOT MET  Pt requires Min A to complete toileting tasks                      Section completed by:  Dorothy Ko, Student       Cognitive Linquistic Functions  Comprehension Initial:  5 - Stand-by Prompting/Supervision or Set-up  Comprehension Current:  5 - Stand-by Prompting/Supervision or Set-up   Comprehension Description:  Verbal cues  Expression Initial:  5 - Stand-by Prompting/Supervision or Set-up  Expression Current:  5 - Stand-by Prompting/Supervision or Set-up   Expression Description:  Verbal cueing  Social Interaction Initial:  4 - Minimal Assistance  Social Interaction Current:  4 - Minimal Assistance   Social Interaction Description:  Increased time, Verbal cues, Low stim environment  Problem Solving Initial:  4 - Minimal Assistance  Problem Solving Current:  4 - Minimal Assistance   Problem Solving Description:  Verbal cueing, Therapy schedule  Memory Initial:  3 - Moderate Assistance  Memory Current:  3 - Moderate Assistance   Memory Description:  Verbal cueing, Therapy schedule  Executive Functioning / Organization Initial:  To Be Assessed  Executive Functioning / Organization Current:  To Be Assessed   Executive Functioning Desciption:  TBD  Swallowing  Swallowing Status:  MBSS completed, pt tolerated thin liquids without aspiration/penetration and mild-moderate residue with mech soft textures that he was able to clear independently with a second swallow.  Pt currently does not have his dentures which causes difficulty when attempting to chew mech soft or regular  textures.  Recommend pt remain on a dysphagia 2 diet with thin liquids until he has his dentures (pt's mother reported she would bring them this evening).    Orders Placed This Encounter   Procedures   • Diet Order Regular     Standing Status:   Standing     Number of Occurrences:   1     Order Specific Question:   Diet:     Answer:   Regular [1]     Order Specific Question:   Texture/Fiber modifications:     Answer:   Dysphagia 2(Pureed/Chopped)specify fluid consistency(question 6) [2]     Order Specific Question:   Consistency/Fluid modifications:     Answer:   Thin Liquids [3]     Behavior Modification Plan  Allow for rest time, Keep instructions simple/concrete, Redirect to task/topic and Analyze tasks (break down into smaller steps)  Assistive Technology  Memory aides: and Low tech: Calendar, planner, schedule, alarms/timers, pill organizer, post-it notes, lists  Family Training/Education:  To be completed   DC Recommendations:   TBD  Strengths:  Able to follow instructions, Alert and oriented, Effective communication skills, Independent PLOF, Motivated for self care and independence, Pleasant and cooperative, Supportive family and Willingly participates in therapeutic activities  Barriers:  Pain poorly managed, Poor activity tolerance and Poor carryover of learning  # of short term goals set=3  # of short term goals met=1       Speech Therapy Problems           Problem: Problem Solving STGs     Dates: Start: 06/12/19       Goal: STG-Within one week, patient will     Dates: Start: 06/12/19       Description: 1) Individualized goal:  Continue administration of the SCCAN with goals added as appropriate   2) Interventions:  SLP Speech Language Treatment, SLP Self Care / ADL Training , SLP Cognitive Skill Development and SLP Group Treatment       Note:     Goal Note filed on 06/13/19 1140 by Ankit Kruger MS,CCC-SLP    Goal: STG-Within one week, patient will  Outcome: NOT MET  Pt recently evaluated, continue to  target                 Goal: STG-Within one week, patient will     Dates: Start: 06/12/19       Description: 1) Individualized goal:  Achieve a score of 12/12 in 3 consecutive sessions on the [place] PTA scale   2) Interventions:  SLP Speech Language Treatment, SLP Self Care / ADL Training , SLP Cognitive Skill Development and SLP Group Treatment       Note:     Goal Note filed on 06/13/19 1140 by Ankit Kruger MS,CCC-SLP    Goal: STG-Within one week, patient will  Outcome: NOT MET  Pt recently evaluated, continue to target                   Problem: Speech/Swallowing LTGs     Dates: Start: 06/12/19       Goal: LTG-By discharge, patient will safely swallow     Dates: Start: 06/12/19       Description: 1) Individualized goal:  The least restrictive diet for safe intake of daily meals   2) Interventions:  SLP Swallowing Dysfunction Treatment, SLP Oral Pharyngeal Evaluation, SLP Video Swallow Evaluation and SLP Group Treatment             Goal: LTG-By discharge, patient will solve complex problem     Dates: Start: 06/12/19       Description: 1) Individualized goal:  Given spv for a safe discharge home  2) Interventions:  SLP Speech Language Treatment, SLP Self Care / ADL Training , SLP Cognitive Skill Development and SLP Group Treatment               Problem: Swallowing STGs     Dates: Start: 06/12/19       Goal: STG-Within one week, patient will     Dates: Start: 06/13/19       Description: 1) Individualized goal:  Tolerate trials of dysphagia 3 textures with dentures without difficulty .   2) Interventions:  SLP Swallowing Dysfunction Treatment, SLP Oral Pharyngeal Evaluation, SLP Video Swallow Evaluation and SLP Group Treatment                    Section completed by:  Ankit Kruger MS,CCC-SLP          REHAB-Pharmacy IDT Team Note by Ulises Mendez RPH at 6/12/2019  4:39 PM  Version 1 of 1    Author:  Ulises Mendez RPH Service:  (none) Author Type:  Pharmacist    Filed:  6/12/2019  4:40 PM Date of  Service:  6/12/2019  4:39 PM Status:  Signed    :  Ulises Mendez RPH (Pharmacist)         Pharmacy   Pharmacy  Antibiotics/Antifungals/Antivirals:  Medication:      Active Orders     None        Route:         None  Stop Date:  N/A  Reason:   Antihypertensives/Cardiac:  Medication:    Orders (72h ago through future)    Start     Ordered    06/11/19 1629  hydrALAZINE (APRESOLINE) tablet 25 mg  EVERY 8 HOURS PRN      06/11/19 1629        Patient Vitals for the past 24 hrs:   BP Pulse   06/12/19 0800 111/77 90   06/11/19 1905 - 85   06/11/19 1835 133/78 86       Anticoagulation:  Medication:  lovenox  INR:      Other key medications:     A review of the medication list reveals no issues at this time.    Section completed by:  Ulises Mendez RPH[WR.1]        Attribution Bassett     WR.1 - Ulises Mendez RPH on 6/12/2019  4:39 PM                      DC Planning    DC destination/dispostion:  Home w/ supportive family.    Referrals: TBD: worker's comp following    DC Needs: DME for patient safety & mobility. OP therapies. MD f/u appts. TBI education. Family training.    Barriers to discharge: Funcitional mobility deficits. Cognitive deficits.     Strengths: Motivation. Age. PLOF: independent, works FT, drives.    Section completed by:  Mervat Rivas R.N.    Summary: fatigued, sleepy, c/o HA, CGA transfers, gait 200ft w/ CGA, fatigue & HA limit activity, memory deficits, poor safety awareness, double vision.    Physician Summary  PHYLLIS New MD  participated and led team conference discussion.

## 2019-06-14 LAB
BASOPHILS # BLD AUTO: 0.4 % (ref 0–1.8)
BASOPHILS # BLD: 0.04 K/UL (ref 0–0.12)
EOSINOPHIL # BLD AUTO: 0.22 K/UL (ref 0–0.51)
EOSINOPHIL NFR BLD: 2.4 % (ref 0–6.9)
ERYTHROCYTE [DISTWIDTH] IN BLOOD BY AUTOMATED COUNT: 44.5 FL (ref 35.9–50)
HCT VFR BLD AUTO: 37.2 % (ref 42–52)
HGB BLD-MCNC: 11.8 G/DL (ref 14–18)
IMM GRANULOCYTES # BLD AUTO: 0.07 K/UL (ref 0–0.11)
IMM GRANULOCYTES NFR BLD AUTO: 0.8 % (ref 0–0.9)
LYMPHOCYTES # BLD AUTO: 2.01 K/UL (ref 1–4.8)
LYMPHOCYTES NFR BLD: 21.9 % (ref 22–41)
MCH RBC QN AUTO: 30.2 PG (ref 27–33)
MCHC RBC AUTO-ENTMCNC: 31.7 G/DL (ref 33.7–35.3)
MCV RBC AUTO: 95.1 FL (ref 81.4–97.8)
MONOCYTES # BLD AUTO: 0.59 K/UL (ref 0–0.85)
MONOCYTES NFR BLD AUTO: 6.4 % (ref 0–13.4)
NEUTROPHILS # BLD AUTO: 6.24 K/UL (ref 1.82–7.42)
NEUTROPHILS NFR BLD: 68.1 % (ref 44–72)
NRBC # BLD AUTO: 0 K/UL
NRBC BLD-RTO: 0 /100 WBC
PLATELET # BLD AUTO: 404 K/UL (ref 164–446)
PMV BLD AUTO: 9.3 FL (ref 9–12.9)
RBC # BLD AUTO: 3.91 M/UL (ref 4.7–6.1)
WBC # BLD AUTO: 9.2 K/UL (ref 4.8–10.8)

## 2019-06-14 PROCEDURE — 770010 HCHG ROOM/CARE - REHAB SEMI PRIVAT*

## 2019-06-14 PROCEDURE — G0515 COGNITIVE SKILLS DEVELOPMENT: HCPCS

## 2019-06-14 PROCEDURE — 97112 NEUROMUSCULAR REEDUCATION: CPT

## 2019-06-14 PROCEDURE — 85025 COMPLETE CBC W/AUTO DIFF WBC: CPT

## 2019-06-14 PROCEDURE — A9270 NON-COVERED ITEM OR SERVICE: HCPCS | Performed by: PHYSICAL MEDICINE & REHABILITATION

## 2019-06-14 PROCEDURE — 97116 GAIT TRAINING THERAPY: CPT

## 2019-06-14 PROCEDURE — 99232 SBSQ HOSP IP/OBS MODERATE 35: CPT | Performed by: PHYSICAL MEDICINE & REHABILITATION

## 2019-06-14 PROCEDURE — 700111 HCHG RX REV CODE 636 W/ 250 OVERRIDE (IP): Performed by: PHYSICAL MEDICINE & REHABILITATION

## 2019-06-14 PROCEDURE — 36415 COLL VENOUS BLD VENIPUNCTURE: CPT

## 2019-06-14 PROCEDURE — 97110 THERAPEUTIC EXERCISES: CPT

## 2019-06-14 PROCEDURE — 97530 THERAPEUTIC ACTIVITIES: CPT

## 2019-06-14 PROCEDURE — 700102 HCHG RX REV CODE 250 W/ 637 OVERRIDE(OP): Performed by: PHYSICAL MEDICINE & REHABILITATION

## 2019-06-14 PROCEDURE — 92526 ORAL FUNCTION THERAPY: CPT

## 2019-06-14 RX ADMIN — OMEPRAZOLE 20 MG: 20 CAPSULE, DELAYED RELEASE ORAL at 09:15

## 2019-06-14 RX ADMIN — SENNOSIDES AND DOCUSATE SODIUM 2 TABLET: 8.6; 5 TABLET ORAL at 09:15

## 2019-06-14 RX ADMIN — ENOXAPARIN SODIUM 40 MG: 100 INJECTION SUBCUTANEOUS at 09:15

## 2019-06-14 RX ADMIN — SENNOSIDES AND DOCUSATE SODIUM 2 TABLET: 8.6; 5 TABLET ORAL at 20:09

## 2019-06-14 RX ADMIN — VITAMIN D, TAB 1000IU (100/BT) 2000 UNITS: 25 TAB at 09:15

## 2019-06-14 RX ADMIN — BUTALBITAL, ACETAMINOPHEN AND CAFFEINE 2 TABLET: 50; 325; 40 TABLET ORAL at 02:37

## 2019-06-14 RX ADMIN — MAGNESIUM HYDROXIDE 30 ML: 400 SUSPENSION ORAL at 09:15

## 2019-06-14 RX ADMIN — ACETAMINOPHEN 650 MG: 325 TABLET, FILM COATED ORAL at 19:42

## 2019-06-14 RX ADMIN — BUTALBITAL, ACETAMINOPHEN AND CAFFEINE 2 TABLET: 50; 325; 40 TABLET ORAL at 11:02

## 2019-06-14 RX ADMIN — BUTALBITAL, ACETAMINOPHEN AND CAFFEINE 2 TABLET: 50; 325; 40 TABLET ORAL at 18:34
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
## 2019-06-14 NOTE — THERAPY
Physical Therapy   Daily Treatment     Patient Name: Rehab Tuesday  Age:  43 y.o., Sex:  male  Medical Record #: [id number]  Today's Date: 6/14/2019     Precautions  Precautions: (P) Swallow Precautions ( See Comments), Fall Risk  Comments: (P) dyphagia 2 with thins, diplopia, HA    Subjective    Pt seated at EOB w/ SO. Pt agreeable to tx.     Objective       06/14/19 1001   Cosignature   Documentation Review Approved with modifications made by preceptor in flowsheet   Precautions   Precautions Swallow Precautions ( See Comments);Fall Risk   Comments dyphagia 2 with thins, diplopia, HA   Cognition    Level of Consciousness Alert   Bed Mobility    Supine to Sit Modified Independent   Sit to Supine Modified Independent   Sit to Stand Stand by Assist   Scooting Modified Independent   Rolling Modified Independent   Neuro-Muscular Treatments   Comments Standing in // bars w/o UE support, narrow stance on flat ground x 30sec, narrow stance on flat ground w/ eyes closed x 30 sec, sharpened romberg w/ alternating feet 2 x 30sec, SLS on flat ground w/ alternating feet 2 x 30sec, CGA.  Basketball bounce pass in standing, x 5min, SBA.    Interdisciplinary Plan of Care Collaboration   IDT Collaboration with  Family / Caregiver   Patient Position at End of Therapy Seated;Edge of Bed;Family / Friend in Room;Tray Table within Reach;Call Light within Reach;Phone within Reach   Collaboration Comments Pt and SO cleared to AMB on unit with gait belt and CGA.   PT Total Time Spent   PT Individual Total Time Spent (Mins) 60   PT Charge Group   PT Gait Training 1   PT Neuromuscular Re-Education / Balance 3     FIM Toilet Transfer Score:  5 - Standby Prompting/Supervision or Set-up  Toilet Transfer Description:  Increased time, Grab bar, Supervision for safety (gait belt, SBA)    FIM Bed/Chair/Wheelchair Transfers Score: 4 - Minimal Assistance  Bed/Chair/Wheelchair Transfers Description:   (CGA-SBA, gait belt, elevated HOB, cueing)    FIM  Walking Score:  5 - Standby Prompting/Supervision or Set-up  Walking Description:   (gait belt, 200ft x 1, outside on even ground, up/down ramps, over rocks, on grass 1000ft x 1, SBA.  SO to provide CGA with ambulation using gait belt.)    FIM Wheelchair Score:  Not tested  Wheelchair Description: (n/a due to ambulatory status; Okay with CGA, gait belt, and staff or SO.)        Educated SO about guarding pt during AMB, toilet transfer and bed transfer. SO demonstrated understanding.    Pt and SO cleared to AMB on unit.    Assessment    Pt has increased tolerance to multiple stimuli in open environment during AMB and balance activities. Pt has good balance in gait and mildly impaired standing balance on dynamic surfaces when moving outside of [place]. Pt has increased AMB endurance. Pt has moderate difficulty w/ vision to right and runs into objects on right side.     Plan    High level dynamic balance activities, AMB w/ head turns, AMB endurance, Continue increasing stimuli during tasks , dual tasking activities, wayfinding

## 2019-06-14 NOTE — CARE PLAN
Problem: Problem Solving STGs  Goal: STG-Within one week, patient will  1) Individualized goal:  Continue administration of the SCCAN with goals added as appropriate   2) Interventions:  SLP Speech Language Treatment, SLP Self Care / ADL Training , SLP Cognitive Skill Development and SLP Group Treatment     Outcome: MET Date Met: 06/14/19  SCCAN completed

## 2019-06-14 NOTE — THERAPY
Speech Language Pathology  Daily Treatment     Patient Name: Rehab Tuesday  Age:  43 y.o., Sex:  male  Medical Record #: [id number]  Today's Date: 6/14/2019     Subjective    Pt was pleasant and cooperative during this ST session      Objective       06/14/19 0801   Dysphagia    Diet / Liquid Recommendation Dysphagia III;Thin Liquid   SLP Total Time Spent   SLP Individual Total Time Spent (Mins) 30   Charge Group   SLP Swallowing Dysfunction Treatment Swallowing Dysfunction Treatment       Assessment    Pt trialed mechanical soft textures with thin liquids while wearing his dentures with only one cough noted on a very large bite.  Other than this pt was able to tolerate dysphagia 3 trials without difficulty.  Recommend upgrading pt to dysphagia 3 textures.      Plan    Trial regular textures.
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
## 2019-06-14 NOTE — THERAPY
Speech Language Pathology  Daily Treatment     Patient Name: Rehab Tuesday  Age:  43 y.o., Sex:  male  Medical Record #: [id number]  Today's Date: 6/14/2019     Subjective    Pt in bed at the beginning of this session, willing to transfer out of bed for therapy     Objective       06/14/19 0931   Outcome Measures   Outcome Measures Utilized SCCAN   SCCAN (Scales of Cognitive and Communicative Ability for Neurorehabilitation)   Oral Expression - Raw Score 19   Oral Expression - Scale Performance Score 100   Orientation - Raw Score 12   Orientation - Scale Performance Score 100   Memory - Raw Score 11   Memory - Scale Performance Score 58   Speech Comprehension - Raw Score 13   Speech Comprehension - Scale Performance Score 100   Reading Comprehension - Raw Score 10   Reading Comprehension - Scale Performance Score 83   Writing - Raw Score 7   Writing - Scale Performance Score 100   Attention - Raw Score 14   Attention - Scale Performance Score 88   Problem Solving - Raw Score 19   Problem Solving - Scale Performance Score 83   SCCAN Total Raw Score 81   SCCAN Degree of Severity Mild Impairment   WPTAS ([place] Post-traumatic Amnesia Scale)   How old are you? 1   What is your date of birth? 1   What month are we in? 1   What time of day is it? (morning, noon, or night) 0   What day of the week is it? 1   What year are we in? 1   What is the name of this place 1   Do you remember me? 1   What is my name? 1   Target Picture 1 1   Target Picture 2 1   Target Picture 3 1   Orientation Score 6   Recall Score 5   Total Score 11   Still in post-traumatic amnesia? Yes   SLP Total Time Spent   SLP Individual Total Time Spent (Mins) 30   Charge Group   SLP Cognitive Skill Development 2       FIM Eating Score:  5 - Standby Prompting/Supervision or Set-up  Eating Description:  Increased time, Modified diet, Supervision for safety, Verbal cueing    FIM Comprehension Score:  6 - Modified Independent  Comprehension Description:   Verbal cues    FIM Expression Score:  6 - Modified Independent  Expression Description:  Verbal cueing    FIM Social Interaction Score:  5 - Stand-by Prompting/Supervision or Set-up  Social Interaction Description:  Increased time, Verbal cues    FIM Problem Solving Score:  4 - Minimal Assistance  Problem Solving Description:  Verbal cueing, Therapy schedule, Increased time    FIM Memory Score:  3 - Moderate Assistance  Memory Description:  Verbal cueing, Therapy schedule      Assessment    WPTAS completed, pt scored an 11/12 (reported that it was 12:00 in the afternoon), continue with picture set 1 for tomorrow.  The SCCAN was completed.  Pt scored an 81/94 indicating mild cognitive deficits, moderate-severe memory deficits noted (58%) and mild attention (88%) and problem solving (83%) deficits noted.      Plan    Continue WPTAS and targeting memory

## 2019-06-14 NOTE — THERAPY
Occupational Therapy  Daily Treatment     Patient Name: Rehab Tuesday  Age:  43 y.o., Sex:  male  Medical Record #: [id number]  Today's Date: 6/14/2019     Precautions  Precautions: Swallow Precautions ( See Comments), Fall Risk  Comments: dyphagia 2 with thins, diplopia, HA         Subjective     Objective       06/14/19 1301   Precautions   Precautions Swallow Precautions ( See Comments);Fall Risk   Comments dyphagia 2 with thins, diplopia, HA   Vitals   O2 Delivery None (Room Air)   Pain 0 - 10 Group   Location Head   Pain Rating Scale (NPRS) 3   Therapist Pain Assessment Post Activity Pain Same as Prior to Activity   Cognition    Level of Consciousness Alert   Standing Upper Body Exercises   Comments Standing at FluidoBike, Level 3 for 6.5 mins, level 4 for 8.5 mins. 15 mins total, 5.090km   Balance   Comments 1) EOM, pt instructed to copy 21 piece PVC Pipe pattern, pt requried to walk to other side of mat to retrieve 1 piece at a time. CGA for first half of pattern, SBA for second half of pattern. Pt then instructed to take apart pattern and walk to bucket on other side of mat one by one and walk over 1 bolster there and back. SBA for entire decunstruction of pattern. total of 42 pieces. 1,260ft total ambulated. 42 xs over bolsters. 2) pt standing on bosu ball via CGA. Pt instructed to bounce physioball to therapist 7-8ft away. total of 100 bounces. 30 bounces 15 degrees from midline L and R each, and 40 bounces straight on. LOB x5 self-corrected. No RBs   Interdisciplinary Plan of Care Collaboration   Patient Position at End of Therapy Seated;Family / Friend in Room       Assessment    Pt alert and cooperative w/ tx session. Tx session emphasized endurance and higher level balance.     Plan    Cont to address endurance, balance, vision (diplopia), safety awareness (impulsivity) and cognition for functional ADLs/IADLs

## 2019-06-15 PROCEDURE — 770010 HCHG ROOM/CARE - REHAB SEMI PRIVAT*

## 2019-06-15 PROCEDURE — 700102 HCHG RX REV CODE 250 W/ 637 OVERRIDE(OP): Performed by: PHYSICAL MEDICINE & REHABILITATION

## 2019-06-15 PROCEDURE — 700111 HCHG RX REV CODE 636 W/ 250 OVERRIDE (IP): Performed by: PHYSICAL MEDICINE & REHABILITATION

## 2019-06-15 PROCEDURE — G0515 COGNITIVE SKILLS DEVELOPMENT: HCPCS

## 2019-06-15 PROCEDURE — 97110 THERAPEUTIC EXERCISES: CPT

## 2019-06-15 PROCEDURE — A9270 NON-COVERED ITEM OR SERVICE: HCPCS | Performed by: PHYSICAL MEDICINE & REHABILITATION

## 2019-06-15 PROCEDURE — 97530 THERAPEUTIC ACTIVITIES: CPT

## 2019-06-15 PROCEDURE — 97116 GAIT TRAINING THERAPY: CPT

## 2019-06-15 PROCEDURE — 97535 SELF CARE MNGMENT TRAINING: CPT

## 2019-06-15 RX ADMIN — MAGNESIUM HYDROXIDE 30 ML: 400 SUSPENSION ORAL at 09:21

## 2019-06-15 RX ADMIN — BUTALBITAL, ACETAMINOPHEN AND CAFFEINE 2 TABLET: 50; 325; 40 TABLET ORAL at 11:02

## 2019-06-15 RX ADMIN — SENNOSIDES AND DOCUSATE SODIUM 2 TABLET: 8.6; 5 TABLET ORAL at 09:22

## 2019-06-15 RX ADMIN — ENOXAPARIN SODIUM 40 MG: 100 INJECTION SUBCUTANEOUS at 09:21

## 2019-06-15 RX ADMIN — VITAMIN D, TAB 1000IU (100/BT) 2000 UNITS: 25 TAB at 09:22

## 2019-06-15 RX ADMIN — SENNOSIDES AND DOCUSATE SODIUM 2 TABLET: 8.6; 5 TABLET ORAL at 19:48

## 2019-06-15 RX ADMIN — ACETAMINOPHEN 650 MG: 325 TABLET, FILM COATED ORAL at 09:26

## 2019-06-15 RX ADMIN — OMEPRAZOLE 20 MG: 20 CAPSULE, DELAYED RELEASE ORAL at 09:21

## 2019-06-15 RX ADMIN — BUTALBITAL, ACETAMINOPHEN AND CAFFEINE 2 TABLET: 50; 325; 40 TABLET ORAL at 05:00

## 2019-06-15 NOTE — THERAPY
Physical Therapy   Daily Treatment     Patient Name: Rehab Tuesday  Age:  43 y.o., Sex:  male  Medical Record #: [id number]  Today's Date: 6/15/2019     Precautions  Precautions: Swallow Precautions ( See Comments)  Comments: dyphagia 2 with thins, diplopia, HA    Subjective    Patient reports extremely tired; anxious for therapy breaks so he can rest     Objective       06/15/19 0831   Precautions   Precautions Swallow Precautions ( See Comments)   Comments dyphagia 2 with thins, diplopia, HA   Sitting Lower Body Exercises   Nustep Resistance Level 3  (5min warm-up, 5min cool-down)   Interdisciplinary Plan of Care Collaboration   IDT Collaboration with  Family / Caregiver   Collaboration Comments wife participated in session   PT Total Time Spent   PT Individual Total Time Spent (Mins) 60   PT Charge Group   PT Gait Training 2   PT Therapeutic Exercise 1   PT Therapeutic Activities 1       FIM Bed/Chair/Wheelchair Transfers Score: 5 - Standby Prompting/Supervision or Set-up  Bed/Chair/Wheelchair Transfers Description:   (SPV SPT)    FIM Walking Score:  5 - Standby Prompting/Supervision or Set-up  Walking Description:   (approx 1000ft x 2 no AD indoors SBA, min-mod assist for wayfinding)    FIM Stairs Score:  5 - Standby Prompting/Supervision or Set-up  Stairs Description:   (20 stairs step-over-step intermittant use of rails SBA/SPV)      Assessment    Patient completed scavenger hunt 2x ambulating approx 1000ft each trial  Trial 1- 12min, 0 LOB, mod assist for wayfinding  Trial 2- 10min, 0 LOB, min assist for wayfinding    Plan    Continue to practice dual-task ambulation, outdoor ambulation, high-level balance activities

## 2019-06-15 NOTE — PROGRESS NOTES
Received patient during shift change, report rec'd from day shift RN. Resting in bed, family member visiting, questions answered. VS stable on room air. Continent of B&B, for transfers. A&O x 4, able to make needs known. Bed in low position, call light within reach.

## 2019-06-15 NOTE — THERAPY
"Occupational Therapy  Daily Treatment     Patient Name: Rehab Tuesday  Age:  43 y.o., Sex:  male  Medical Record #: [id number]  Today's Date: 6/15/2019     Precautions  Precautions: (P) Swallow Precautions ( See Comments)  Comments: (P) dyphagia 2 with thins, diplopia, HA    Safety   ADL Safety : (P) Requires Supervision for Safety  Bathroom Safety: (P) Requires Supervision for Safety    Subjective    \"OK\" agreeable to tx      Objective       06/15/19 1331   Precautions   Precautions Swallow Precautions ( See Comments)   Comments dyphagia 2 with thins, diplopia, HA   Safety    ADL Safety  Requires Supervision for Safety   Bathroom Safety Requires Supervision for Safety   Interdisciplinary Plan of Care Collaboration   IDT Collaboration with  Family / Caregiver   Collaboration Comments JENNIFER Quezada particiapted in family training    and has been cleared to assist patient with ADL and related mobiltiy ( shower dress toileting  grooming)   transfers / mobility    Therapy Missed   Missed Therapy (Minutes) 60   OT Charge Group   OT Self Care / ADL 3   OT Therapy Activity 1       FIM Bathing Score:  5 - Standby Prompting/Supervision or Set-up  Bathing Description:       FIM Upper Body Dressin - Standby Prompting/Supervision or Set-up  Upper Body Dressing Description:       FIM Lower Body Dressing Score:  5 - Standby Prompting/Supervsion or Set-up  Lower Body Dressing Description:       FIM Toiletin - Standby Prompting/Supervision or Set-up  Toileting Description:       FIM Toilet Transfer Score:  5 - Standby Prompting/Supervision or Set-up  Toilet Transfer Description:  Grab bar    FIM Tub/Shower Transfers Score:  5 - Standby Prompting/Supervision or Set-up  Tub/Shower Transfers Description:        Patient issued  Adaptive  Safety glasses with partial occlusion nasal side of left lens to promote binocular vision and reduce double vision .    Assessment    Significant other cleared to assist patient with self care and " related mobility.        Plan    Continue ADL  IADL , related mobility strength/endurance  Balance activity    Convergence and oculomotor exercises as tolerated

## 2019-06-15 NOTE — THERAPY
Speech Language Pathology  Daily Treatment     Patient Name: Rehab Tuesday  Age:  43 y.o., Sex:  male  Medical Record #: [id number]  Today's Date: 6/15/2019     Subjective    Pt pleasant and cooperative, SO present for session and supportive.        Objective       06/15/19 1001   Cognition   Moderate Attention Minimal (4)   Orientation  Supervision (5)   Verbal Short Term Memory 5 Minutes   Insight into Deficits Minimal (4)   Interdisciplinary Plan of Care Collaboration   IDT Collaboration with  Family / Caregiver;Nursing   Patient Position at End of Therapy In Bed;Family / Friend in Room;Call Light within Reach   Collaboration Comments SO participated in session; Nursing- pt continues to have headache   SLP Total Time Spent   SLP Individual Total Time Spent (Mins) 30   Charge Group   SLP Cognitive Skill Development 2       FIM Comprehension Score:  6 - Modified Independent  Comprehension Description:  Verbal cues    FIM Expression Score:  6 - Modified Independent  Expression Description:  Verbal cueing    FIM Social Interaction Score:  5 - Stand-by Prompting/Supervision or Set-up  Social Interaction Description:  Increased time, Verbal cues    FIM Problem Solving Score:  4 - Minimal Assistance  Problem Solving Description:  Verbal cueing, Therapy schedule, Increased time    FIM Memory Score:  3 - Moderate Assistance  Memory Description:  Verbal cueing, Therapy schedule      Assessment    Pt completed Easy and Difficult level calendar organization tasks w/ 100% acc IND. Pt IND self-monitoring and correcting moments of rushing through task items.   Pt educated on use of RWAVS memory strategies. Pt req'd MOD-MIN A to complete tx journal for today's PT and ST sessions.   Constant Tx- Pattern Recreation=- Level 1-96%, Level 4- 70% acc; Auditory Commands Level 1 100%, Level 2- 100% acc     Plan    Assess executive function, planning and reasoning. Continue to address memory.

## 2019-06-15 NOTE — CARE PLAN
Problem: Safety  Goal: Will remain free from injury  Using call light for assist as needed. Bed in low position, call light within reach.    Problem: Pain Management  Goal: Pain level will decrease to patient's comfort goal  Administered PRN tylenol, per request for headache, fioricet already administered. Good effect, upon reassess, resting in bed, eyes closed, resp even, unlabored.

## 2019-06-16 PROCEDURE — 700102 HCHG RX REV CODE 250 W/ 637 OVERRIDE(OP): Performed by: PHYSICAL MEDICINE & REHABILITATION

## 2019-06-16 PROCEDURE — 700111 HCHG RX REV CODE 636 W/ 250 OVERRIDE (IP): Performed by: PHYSICAL MEDICINE & REHABILITATION

## 2019-06-16 PROCEDURE — A9270 NON-COVERED ITEM OR SERVICE: HCPCS | Performed by: PHYSICAL MEDICINE & REHABILITATION

## 2019-06-16 PROCEDURE — 770010 HCHG ROOM/CARE - REHAB SEMI PRIVAT*

## 2019-06-16 RX ADMIN — OXYCODONE HYDROCHLORIDE 10 MG: 10 TABLET ORAL at 09:13

## 2019-06-16 RX ADMIN — OMEPRAZOLE 20 MG: 20 CAPSULE, DELAYED RELEASE ORAL at 09:06

## 2019-06-16 RX ADMIN — SENNOSIDES AND DOCUSATE SODIUM 2 TABLET: 8.6; 5 TABLET ORAL at 09:06

## 2019-06-16 RX ADMIN — MAGNESIUM HYDROXIDE 30 ML: 400 SUSPENSION ORAL at 09:10

## 2019-06-16 RX ADMIN — ENOXAPARIN SODIUM 40 MG: 100 INJECTION SUBCUTANEOUS at 09:07

## 2019-06-16 RX ADMIN — SENNOSIDES AND DOCUSATE SODIUM 2 TABLET: 8.6; 5 TABLET ORAL at 22:29

## 2019-06-16 RX ADMIN — VITAMIN D, TAB 1000IU (100/BT) 2000 UNITS: 25 TAB at 09:06

## 2019-06-16 ASSESSMENT — PATIENT HEALTH QUESTIONNAIRE - PHQ9
1. LITTLE INTEREST OR PLEASURE IN DOING THINGS: NOT AT ALL
SUM OF ALL RESPONSES TO PHQ9 QUESTIONS 1 AND 2: 0
2. FEELING DOWN, DEPRESSED, IRRITABLE, OR HOPELESS: NOT AT ALL

## 2019-06-16 NOTE — PROGRESS NOTES
Received patient on bed. Patient conscious and coherent. With wife at bedside. Patient verbalized no headache at the moment. Per patient no significant changes in patient CMS. Safety and fall precaution reinforced.

## 2019-06-16 NOTE — PROGRESS NOTES
Shift received, patient alert and oriented in no acute distress, stated a pain level of 6 at this time. Will continue to monitor.

## 2019-06-16 NOTE — CARE PLAN
Problem: Safety  Goal: Will remain free from injury  Patient demonstrates good safety technique this shift.  Asks for assistance when needed and does not attempt self transfer.  Able to verbalize needs.  Will continue to monitor.    Problem: Venous Thromboembolism (VTW)/Deep Vein Thrombosis (DVT) Prevention:  Goal: Patient will participate in Venous Thrombosis (VTE)/Deep Vein Thrombosis (DVT)Prevention Measures  Patient on Lovenox therapy for DVT prophylaxis.    Problem: Pain Management  Goal: Pain level will decrease to patient's comfort goal  Patient reports satisfactory pain control and decrease intensity after pharmacological pain management.

## 2019-06-16 NOTE — CARE PLAN
Problem: Safety  Goal: Will remain free from injury  Outcome: PROGRESSING AS EXPECTED  Safety and fall precaution reinforced. Patient has been ambulating with assist with gait belt. Patient has a steady gait.     Problem: Pain Management  Goal: Pain level will decrease to patient's comfort goal  Outcome: PROGRESSING AS EXPECTED  Per patient current headache has been controlled by fioricet PO. Encouraged patient to inform RN for any headache.

## 2019-06-17 PROCEDURE — G0515 COGNITIVE SKILLS DEVELOPMENT: HCPCS

## 2019-06-17 PROCEDURE — 97112 NEUROMUSCULAR REEDUCATION: CPT

## 2019-06-17 PROCEDURE — 97530 THERAPEUTIC ACTIVITIES: CPT

## 2019-06-17 PROCEDURE — 99232 SBSQ HOSP IP/OBS MODERATE 35: CPT | Performed by: PHYSICAL MEDICINE & REHABILITATION

## 2019-06-17 PROCEDURE — 700111 HCHG RX REV CODE 636 W/ 250 OVERRIDE (IP): Performed by: PHYSICAL MEDICINE & REHABILITATION

## 2019-06-17 PROCEDURE — 770010 HCHG ROOM/CARE - REHAB SEMI PRIVAT*

## 2019-06-17 PROCEDURE — A9270 NON-COVERED ITEM OR SERVICE: HCPCS | Performed by: PHYSICAL MEDICINE & REHABILITATION

## 2019-06-17 PROCEDURE — 700102 HCHG RX REV CODE 250 W/ 637 OVERRIDE(OP): Performed by: PHYSICAL MEDICINE & REHABILITATION

## 2019-06-17 PROCEDURE — 97110 THERAPEUTIC EXERCISES: CPT

## 2019-06-17 PROCEDURE — 97116 GAIT TRAINING THERAPY: CPT

## 2019-06-17 PROCEDURE — 92526 ORAL FUNCTION THERAPY: CPT

## 2019-06-17 RX ADMIN — SENNOSIDES AND DOCUSATE SODIUM 2 TABLET: 8.6; 5 TABLET ORAL at 20:00

## 2019-06-17 RX ADMIN — MAGNESIUM HYDROXIDE 30 ML: 400 SUSPENSION ORAL at 08:27

## 2019-06-17 RX ADMIN — ACETAMINOPHEN 650 MG: 325 TABLET, FILM COATED ORAL at 20:00

## 2019-06-17 RX ADMIN — SENNOSIDES AND DOCUSATE SODIUM 2 TABLET: 8.6; 5 TABLET ORAL at 08:27

## 2019-06-17 RX ADMIN — ENOXAPARIN SODIUM 40 MG: 100 INJECTION SUBCUTANEOUS at 08:27

## 2019-06-17 RX ADMIN — BUTALBITAL, ACETAMINOPHEN AND CAFFEINE 2 TABLET: 50; 325; 40 TABLET ORAL at 08:49

## 2019-06-17 RX ADMIN — OMEPRAZOLE 20 MG: 20 CAPSULE, DELAYED RELEASE ORAL at 08:27

## 2019-06-17 RX ADMIN — VITAMIN D, TAB 1000IU (100/BT) 2000 UNITS: 25 TAB at 08:27

## 2019-06-17 NOTE — THERAPY
"Physical Therapy   Daily Treatment     Patient Name: Tuesday Rehab  Age:  43 y.o., Sex:  male  Medical Record #: [id number]  Today's Date: 6/17/2019     Precautions  Precautions: (P) Swallow Precautions ( See Comments)  Comments: dyphagia 2 with thins, diplopia, HA    Subjective    Pt resting in bed, willing to participate, \"I hope I get to go home sooner then they say\"     Objective       06/17/19 0901   Precautions   Precautions Swallow Precautions ( See Comments)   Neuro-Muscular Treatments   Neuro-Muscular Treatments Facilitation;Postural Changes;Postural Facilitation;Sequencing;Tactile Cuing;Verbal Cuing   Comments standing balance on Airex foam pad eyes closed 3 x 30 sec, feet together/ arms crossed for lateral head turns/ nods 3 x 15 all planes. Pt with close SBA for safety/ Tandem stance for ball toss/ catch 2 x 10 with both L/R LE lead. Soccer ball volley for LE SLS/ intermittent UE support as needed for balance recovery x 5 minutes   PT Total Time Spent   PT Individual Total Time Spent (Mins) 30   PT Charge Group   PT Gait Training 1   PT Neuromuscular Re-Education / Balance 1       FIM Bed/Chair/Wheelchair Transfers Score: 5 - Standby Prompting/Supervision or Set-up  Bed/Chair/Wheelchair Transfers Description:  Supervision for safety    FIM Walking Score:  5 - Standby Prompting/Supervision or Set-up  Walking Description:  Supervision for safety (SPV for safety 1200 ft outdoors / 400 ft x 2 indoors)    FIM Wheelchair Score:  0 - Not tested, patient refused  Wheelchair Description:   Pt is ambulatory    FIM Stairs Score:  5 - Standby Prompting/Supervision or Set-up  Stairs Description:  Hand rails, Supervision for safety, Ascends/descends 12 to 14 steps      Assessment    Pt pleasant and cooperative throughout tx, limited by double vision / impaired activity tolerance    Plan    Continue to practice dual-task ambulation, outdoor ambulation, high-level balance activities    "

## 2019-06-17 NOTE — THERAPY
"Occupational Therapy  Daily Treatment     Patient Name: Tuesday Rehab  Age:  43 y.o., Sex:  male  Medical Record #: [id number]  Today's Date: 6/17/2019     Precautions  Precautions: (P) Swallow Precautions ( See Comments)  Comments: (P) dyphagia 2 with thins, diplopia, HA      Subjective    \"Can I lift some weights?\"     Objective       06/17/19 1301   Precautions   Precautions Swallow Precautions ( See Comments)   Comments dyphagia 2 with thins, diplopia, HA   Sitting Upper Body Exercises   Chest Press 2 sets of 15;Bilateral;Weight (See Comments for lbs)  (vertical bench press with 45# weighted pulleys)   Upper Extremity Bike Level 3 Resistance  (FluidoBike x10 min, alt fwd/bwd pedaling, 0 RB, 4.38km)   Other Exercise Bilateral Row with 45# weighted pulleys, 2x15; completed while seated on physioball for added core strengthening and challenge with balance.   Standing Upper Body Exercises   Other Exercises Bilateral Row standing at equalizer with 45#, 2x15   Interdisciplinary Plan of Care Collaboration   IDT Collaboration with  Family / Caregiver   Patient Position at End of Therapy Seated;Family / Friend in Room   Collaboration Comments SO present for session   OT Total Time Spent   OT Individual Total Time Spent (Mins) 30   OT Charge Group   OT Therapeutic Exercise  2         Assessment    Pt tolerated session well with verbal cues for pacing during activity. Pt's primary complaint con't to be diplopia.     Plan    Continue IADLs, related mobility, cognition ( memory/ problem solving) . Convergence and tracking exercises.     "
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
## 2019-06-17 NOTE — THERAPY
"Physical Therapy   Daily Treatment     Patient Name: Tuesday Rehab  Age:  43 y.o., Sex:  male  Medical Record #: [id number]  Today's Date: 6/17/2019     Precautions  Precautions: Swallow Precautions ( See Comments)  Comments: dyphagia 2 with thins, diplopia, HA    Subjective    Patient agreeable to PT.     Objective       06/17/19 1401   Vitals   O2 (LPM) 0   O2 Delivery None (Room Air)   Neuro-Muscular Treatments   Comments BLE coordination/balance activities with 1 set of 12 cone touches with SBA but good performance, 1 set of 8 reps of Four Square Balance Test with SPV, Min A to CGA at gait belt for ball toss during SLS for 1 set of 20 tosses with a 1-lb ball and the same with a 6\" exercise ball.  Worked on visual screen with: Far vision L 20/30, R 20/25, Near vision with B 20/20.  Patient's smooth pursuits with L eye require increased time noticably most with scanning to L, mild to no saccades with L eye test, pt has difficulty with vision without diplopia (convergence) at this time, and pt had an interesting result with the cover/uncover strabismus test where the L eye would shoot to the far left when either eye was uncovered.  As for c.n. III/IV/VI, improvements in all aspects with L eye scanning ability.  Strengthening L eye movements is primary focus at this time followed by Diplopia management.   Interdisciplinary Plan of Care Collaboration   IDT Collaboration with  Family / Caregiver   Collaboration Comments SO present throughout   PT Total Time Spent   PT Individual Total Time Spent (Mins) 30   PT Charge Group   PT Neuromuscular Re-Education / Balance 2       FIM Walking Score:  5 - Standby Prompting/Supervision or Set-up  Walking Description:   (SBA to/from back gym (2x 200'), gait belt, eye patch. SO provided guarding and cueing appropriately.)      Assessment    Patient has improving endurance and visual scanning; diplopia slightly improved but significantly present without use of eye patch; improving " balance; mild safety impulsiveness but high motivation also.    Plan    Continue to practice L eye scanning/visual activities, dual-task ambulation, outdoor ambulation, high-level balance activities.

## 2019-06-17 NOTE — THERAPY
Speech Language Pathology  Daily Treatment     Patient Name: Tuesday Rehab  Age:  43 y.o., Sex:  male  Medical Record #: [id number]  Today's Date: 6/17/2019     Subjective    Pt agreeable to ST, SO present for session and supportive. Pt continues to demonstrate impulsivity with ambulation. Using gait belt CGA with LOB x2 ambulating to/from ST office. Pt reports double vision impacts balance at this time, wearing eye patch during session.      Objective       06/17/19 1104   Cognition   Orientation  Minimal (4)   Executive Functioning / Organization Minimal (4)   Dysphagia    Other Treatments regular texture trials assessed this date   Diet / Liquid Recommendation Regular;Thin Liquid   Nutritional Liquid Intake Rating Scale 3   Nutritional Food Intake Rating Scale 7   Interdisciplinary Plan of Care Collaboration   IDT Collaboration with  Family / Caregiver   Patient Position at End of Therapy Seated;Family / Friend in Room   Collaboration Comments SO present for session   SLP Total Time Spent   SLP Individual Total Time Spent (Mins) 60   Charge Group   SLP Swallowing Dysfunction Treatment Swallowing Dysfunction Treatment   SLP Cognitive Skill Development 2       FIM Eating Score:  6 - Modified Independent  Eating Description:  Insert dentures    FIM Comprehension Score:  6 - Modified Independent  Comprehension Description:  Adaptive equipment, Verbal cues (pt wearing eye patch to aid in decreasing double vision)    FIM Expression Score:  6 - Modified Independent  Expression Description:  Verbal cueing    FIM Social Interaction Score:  6 - Modified Independent  Social Interaction Description:  Verbal cues    FIM Problem Solving Score:  4 - Minimal Assistance  Problem Solving Description:  Verbal cueing, Therapy schedule, Increased time    FIM Memory Score:  4 - Minimal Assistance  Memory Description:  Verbal cueing, Therapy schedule      Assessment    WPTA Scale administered with pt receiving score of 10/12 (set 1). Pt  not oriented to ELDA (Tuesday) and Face (nurse). Continue administration of Set 1 until score of 12/12 achieved. Pt initiated exec fx task (eating out on a budget). Pt completed menu selections and calculated totals with MIN cues from SO for organization and attention to detail. Unable to complete gratuity calculations and will do so in subsequent ST session. Pt tolerated therapeutic trials of regular textures/thin liquids with no overt s/sx of pen/asp. Pt completed 3oz Lizabeth swallow protocol, PASS. Pt demonstrates rapid rate of intake, however, is able to tolerate without overt s/sx of pen/asp. Pt's dentures present and tolerating advanced textures well. Recommend advance to regular textures, d/c dysphagia therapy and therapeutic dining at this time.     Plan    Complete exec fx task from ST session prior. Continue WPTA, exec function and complex problem solving. Advance to regular textures/thin liquids, d/c dysphagia intervention at this time.

## 2019-06-17 NOTE — THERAPY
Speech Language Pathology  Daily Treatment     Patient Name: Tuesday Rehab  Age:  43 y.o., Sex:  male  Medical Record #: [id number]  Today's Date: 6/17/2019     Subjective    Pt pleasant and cooperative during ST session.       Objective       06/17/19 1331   Cognition   Simple Attention Within Functional Limits (6-7)   Moderate Attention Supervision (5)   Orientation  Supervision (5)   Verbal Short Term Memory (Pt recalled details from yesterday's session.)   Insight into Deficits Supervision (5)   Executive Functioning / Organization Minimal (4)   Interdisciplinary Plan of Care Collaboration   IDT Collaboration with  Nursing   Patient Position at End of Therapy Seated;Call Light within Reach   Collaboration Comments Limitations r/t increased HR.    SLP Total Time Spent   SLP Individual Total Time Spent (Mins) 60   Charge Group   SLP Cognitive Skill Development 4       FIM Comprehension Score:  6 - Modified Independent  Comprehension Description:       FIM Expression Score:  6 - Modified Independent  Expression Description:       FIM Social Interaction Score:  5 - Stand-by Prompting/Supervision or Set-up  Social Interaction Description:       FIM Problem Solving Score:  4 - Minimal Assistance  Problem Solving Description:       FIM Memory Score:  3 - Moderate Assistance  Memory Description:         Assessment    Pt verbally problem solved through functional work related task- identifying damage/problem, generating a quote for work req'd, relaying that cost will fluctuate based on labor req'd and verbally sequencing procedure for repair, SPV cues for occasional tangential information. Divided attn using application based program- MIN A 88% acc.     Plan    Continue to address functional problem solving executive funx, planning.

## 2019-06-17 NOTE — PROGRESS NOTES
Received bedside shift report from Lashay SANDOVAL RN regarding patient and assumed care. Patient awake, calm and stable, currently positioned in bed for comfort and safety; call light within reach. Denies pain or discomfort at this time. Will continue to monitor.

## 2019-06-18 PROCEDURE — 97116 GAIT TRAINING THERAPY: CPT

## 2019-06-18 PROCEDURE — 700102 HCHG RX REV CODE 250 W/ 637 OVERRIDE(OP): Performed by: PHYSICAL MEDICINE & REHABILITATION

## 2019-06-18 PROCEDURE — A9270 NON-COVERED ITEM OR SERVICE: HCPCS | Performed by: PHYSICAL MEDICINE & REHABILITATION

## 2019-06-18 PROCEDURE — 97112 NEUROMUSCULAR REEDUCATION: CPT

## 2019-06-18 PROCEDURE — 97530 THERAPEUTIC ACTIVITIES: CPT

## 2019-06-18 PROCEDURE — 770010 HCHG ROOM/CARE - REHAB SEMI PRIVAT*

## 2019-06-18 PROCEDURE — 99233 SBSQ HOSP IP/OBS HIGH 50: CPT | Performed by: PHYSICAL MEDICINE & REHABILITATION

## 2019-06-18 PROCEDURE — 97110 THERAPEUTIC EXERCISES: CPT

## 2019-06-18 PROCEDURE — G0515 COGNITIVE SKILLS DEVELOPMENT: HCPCS

## 2019-06-18 RX ORDER — AMOXICILLIN 250 MG
2 CAPSULE ORAL 2 TIMES DAILY PRN
Status: DISCONTINUED | OUTPATIENT
Start: 2019-06-18 | End: 2019-06-21 | Stop reason: HOSPADM

## 2019-06-18 RX ORDER — BISACODYL 10 MG
10 SUPPOSITORY, RECTAL RECTAL
Status: DISCONTINUED | OUTPATIENT
Start: 2019-06-18 | End: 2019-06-21 | Stop reason: HOSPADM

## 2019-06-18 RX ORDER — POLYETHYLENE GLYCOL 3350 17 G/17G
1 POWDER, FOR SOLUTION ORAL
Status: DISCONTINUED | OUTPATIENT
Start: 2019-06-18 | End: 2019-06-21 | Stop reason: HOSPADM

## 2019-06-18 RX ORDER — ASPIRIN 81 MG/1
81 TABLET, CHEWABLE ORAL DAILY
Status: DISCONTINUED | OUTPATIENT
Start: 2019-06-18 | End: 2019-06-21 | Stop reason: HOSPADM

## 2019-06-18 RX ADMIN — SENNOSIDES AND DOCUSATE SODIUM 2 TABLET: 8.6; 5 TABLET ORAL at 08:01

## 2019-06-18 RX ADMIN — VITAMIN D, TAB 1000IU (100/BT) 2000 UNITS: 25 TAB at 08:01

## 2019-06-18 RX ADMIN — BUTALBITAL, ACETAMINOPHEN AND CAFFEINE 2 TABLET: 50; 325; 40 TABLET ORAL at 08:01

## 2019-06-18 RX ADMIN — ASPIRIN 81 MG 81 MG: 81 TABLET ORAL at 13:36

## 2019-06-18 RX ADMIN — OMEPRAZOLE 20 MG: 20 CAPSULE, DELAYED RELEASE ORAL at 08:01

## 2019-06-18 RX ADMIN — ACETAMINOPHEN 650 MG: 325 TABLET, FILM COATED ORAL at 20:21

## 2019-06-18 NOTE — THERAPY
Physical Therapy   Daily Treatment     Patient Name: Tuesday Rehab  Age:  43 y.o., Sex:  male  Medical Record #: [id number]  Today's Date: 6/18/2019     Precautions  Precautions: (P) Swallow Precautions ( See Comments)  Comments: (P) diplopia, eye patch/glasses for vision assist    Subjective  I am still seeing double. These glasses aren't comfortable.      Objective     06/18/19 1031   Precautions   Precautions Swallow Precautions ( See Comments)   Comments diplopia, eye patch/glasses for vision assist   Pain 0 - 10 Group   Location Head   Location Orientation Lateral   Description Aching   Comfort Goal Comfort with Movement   Therapist Pain Assessment Prior to Activity;During Activity;Post Activity   Non Verbal Descriptors   Non Verbal Scale  Calm   Cognition    Level of Consciousness Alert   Comments pleasant and cooperative   Standing Lower Body Exercises   Other Exercises bean bag toss x 10 min alternating eye patch and targets, sup for safety; balloon toss in // bars for tracking/balance without eye patch   Neuro-Muscular Treatments   Neuro-Muscular Treatments Facilitation;Weight Shift Right;Weight Shift Left;Postural Changes   Comments B LE/UE coordination/tracking activities with dual task management: bouncing ball with gait and reciting categories; passing ball laterally, R and L directions; scanning walls with consistent gait pattern to faciliate L lateral tracking. 30+ min standing activities with dual task management challenges.   Interdisciplinary Plan of Care Collaboration   Patient Position at End of Therapy Seated  (in lunch room)   PT Total Time Spent   PT Individual Total Time Spent (Mins) 60   PT Charge Group   PT Gait Training 2   PT Neuromuscular Re-Education / Balance 2     High level dynamic gait activities completed throughout session to challenge balance, vision, UE/LE coordination, recall.    FIM Bed/Chair/Wheelchair Transfers Score: 5 - Standby Prompting/Supervision or  Set-up  Bed/Chair/Wheelchair Transfers Description:  Supervision for safety    FIM Walking Score:  5 - Standby Prompting/Supervision or Set-up  Walking Description:  Extra time, Supervision for safety (1500ft around building outdoors with eye patch, SBA, scanning L and R, naming objects in peripheral vision. )      Assessment    Good ability to multitask and maintain balance, gait pace, and recall categories. Patient prefers to use R eye for scanning, but able to complete tasks with L eye at slower pace/rate.    Plan    Continue with high level, dynamic activities, outdoor gait, activities that challenge vision, depth perception, peripheral vision, especially to L side.

## 2019-06-18 NOTE — CARE PLAN
Problem: Communication  Goal: The ability to communicate needs accurately and effectively will improve  Makes needs known to staff.  Encouraged to use call light for staff assist.    Problem: Safety  Goal: Will remain free from falls  Call light kept within reach and encouraged to use for assistance and with toileting needs. Encouraged to call staff and to wait for staff before transfers.    Problem: Pain Management  Goal: Pain level will decrease to patient's comfort goal  Complains of headache tonight, medicated with Tylenol, has + relief.  See MAR and doc flow sheet.  Will continue to monitor patient.

## 2019-06-18 NOTE — THERAPY
Occupational Therapy  Daily Treatment     Patient Name: Tuesday Rehab  Age:  43 y.o., Sex:  male  Medical Record #: [id number]  Today's Date: 6/18/2019     Precautions  Precautions: Swallow Precautions ( See Comments)  Comments: dyphagia 2 with thins, diplopia, HA    Safety   ADL Safety : Requires Supervision for Safety  Bathroom Safety: Requires Supervision for Safety    Subjective     Objective       06/18/19 0931   Precautions   Precautions Swallow Precautions ( See Comments)   Comments dyphagia 2 with thins, diplopia, HA   Vitals   O2 Delivery None (Room Air)   Pain   Intervention Declines   Pain 0 - 10 Group   Pain Rating Scale (NPRS) 0   Cognition    Level of Consciousness Alert   Standing Upper Body Exercises   Comments Standing at FluidoBike, level 6, 20 mins, 5.231km, no RBs   Interdisciplinary Plan of Care Collaboration   Patient Position at End of Therapy Seated;Call Light within Reach;Tray Table within Reach;Phone within Reach       Assessment    Pt alert and cooperative w/ tx session. Verbal instruction to pace self while on the FluidoBike. Tx session emphasized endurance and balance.     Plan    Cont OT to address endurance, balance, safety awareness/impulsivity for ADLs/IADLs

## 2019-06-18 NOTE — DISCHARGE PLANNING
Tc to Sissy Frias / BRITTNEY with workman's comp  to confirm preferred provider for out pt therapy/pharmacy, but not available.   I left detailed message.     Pt will also need follow up with Dr. Bernal Neurosurgeon and Dr. Piazno ENT.   Pt does not have a PCP.

## 2019-06-18 NOTE — THERAPY
Occupational Therapy  Daily Treatment     Patient Name: Tuesday Rehab  Age:  43 y.o., Sex:  male  Medical Record #: [id number]  Today's Date: 6/18/2019     Precautions  Precautions: Swallow Precautions ( See Comments)  Comments: diplopia, eye patch/glasses for vision assist    Safety   ADL Safety : Requires Supervision for Safety  Bathroom Safety: Requires Supervision for Safety    Subjective     Objective       06/18/19 1431   Precautions   Precautions Swallow Precautions ( See Comments)   Comments diplopia, eye patch/glasses for vision assist   Vitals   O2 Delivery None (Room Air)   Pain   Intervention Declines   Pain 0 - 10 Group   Pain Rating Scale (NPRS) 0   Non Verbal Descriptors   Non Verbal Scale  Calm   Cognition    Level of Consciousness Alert   IADL Treatments   Meal Preparation Pt engaged in baking muffins - required min VQs for attention to task. Pt required increased time to complete tasks. Pt demonstrated appropriate safety awareness    Interdisciplinary Plan of Care Collaboration   Patient Position at End of Therapy Edge of Bed;Call Light within Reach;Tray Table within Reach;Phone within Reach       Assessment    Pt alert and cooperative w/ tx session. During cooking activity pt required VQs for set up (pt got distracted gathering items). Pt also required increased time to complete tasks (mixing dough and dishing into muffin tins)    Plan    Cont to address vision, higher level balance, safety awareness for ADL/IADL completion
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
## 2019-06-18 NOTE — CARE PLAN
Problem: Bowel/Gastric:  Goal: Normal bowel function is maintained or improved  Pt. Is continent of bowels. LBM 6/17/19.     Problem: Pain Management  Goal: Pain level will decrease to patient's comfort goal    Intervention: Follow pain managment plan developed in collaboration with patient and Interdisciplinary Team  Pt. C/o headache once during this shift. PRN Fioricet given with positive relief.

## 2019-06-18 NOTE — THERAPY
Speech Language Pathology  Daily Treatment     Patient Name: Tuesday Rehab  Age:  43 y.o., Sex:  male  Medical Record #: [id number]  Today's Date: 6/18/2019     Subjective    Pt was asleep in bed at the beginning of this session, willing to get out of bed to participate in ST      Objective       06/18/19 0831   WPTAS ([place] Post-traumatic Amnesia Scale)   How old are you? 1   What is your date of birth? 1   What month are we in? 1   What time of day is it? (morning, noon, or night) 1   What day of the week is it? 1   What year are we in? 1   What is the name of this place 1   Do you remember me? 1   What is my name? 1   Target Picture 1 1   Target Picture 2 1   Target Picture 3 1   Orientation Score 7   Recall Score 5   Total Score 12   Still in post-traumatic amnesia? Yes   SLP Total Time Spent   SLP Individual Total Time Spent (Mins) 60   Charge Group   SLP Cognitive Skill Development 4       FIM Eating Score:  5 - Standby Prompting/Supervision or Set-up  Eating Description:       FIM Comprehension Score:  6 - Modified Independent  Comprehension Description:       FIM Expression Score:  6 - Modified Independent  Expression Description:       FIM Social Interaction Score:  5 - Stand-by Prompting/Supervision or Set-up  Social Interaction Description:       FIM Problem Solving Score:  4 - Minimal Assistance  Problem Solving Description:       FIM Memory Score:  3 - Moderate Assistance  Memory Description:         Assessment    WPTAS completed, pt scored a 12/12, picture set changed to set 2 tomorrow (Clock, keys, flower).  Pt completed a checkbook management task independently with only one error noted (Pt transferred one number from the calculator incorrectly).  Pt required min-mod cues for memory to locate ST from his room and then again to locate his room from the ST office.  Pt was noted to open his eyes more frequently during conversations.      Plan    Continue WPTAS with picture set 2 and target memory and  executive functions

## 2019-06-19 DIAGNOSIS — S04.42XA: ICD-10-CM

## 2019-06-19 PROCEDURE — 97116 GAIT TRAINING THERAPY: CPT

## 2019-06-19 PROCEDURE — 97112 NEUROMUSCULAR REEDUCATION: CPT

## 2019-06-19 PROCEDURE — 770010 HCHG ROOM/CARE - REHAB SEMI PRIVAT*

## 2019-06-19 PROCEDURE — 700102 HCHG RX REV CODE 250 W/ 637 OVERRIDE(OP): Performed by: PHYSICAL MEDICINE & REHABILITATION

## 2019-06-19 PROCEDURE — 97110 THERAPEUTIC EXERCISES: CPT

## 2019-06-19 PROCEDURE — G0515 COGNITIVE SKILLS DEVELOPMENT: HCPCS

## 2019-06-19 PROCEDURE — A9270 NON-COVERED ITEM OR SERVICE: HCPCS | Performed by: PHYSICAL MEDICINE & REHABILITATION

## 2019-06-19 PROCEDURE — 99232 SBSQ HOSP IP/OBS MODERATE 35: CPT | Performed by: PHYSICAL MEDICINE & REHABILITATION

## 2019-06-19 PROCEDURE — 97535 SELF CARE MNGMENT TRAINING: CPT

## 2019-06-19 PROCEDURE — 97530 THERAPEUTIC ACTIVITIES: CPT

## 2019-06-19 RX ADMIN — BUTALBITAL, ACETAMINOPHEN AND CAFFEINE 2 TABLET: 50; 325; 40 TABLET ORAL at 07:28

## 2019-06-19 RX ADMIN — VITAMIN D, TAB 1000IU (100/BT) 2000 UNITS: 25 TAB at 07:28

## 2019-06-19 RX ADMIN — ASPIRIN 81 MG 81 MG: 81 TABLET ORAL at 07:28

## 2019-06-19 NOTE — PROGRESS NOTES
Received bedside shift report from Lesly EPGUERO RN regarding patient and assumed care. Patient awake, calm and stable, currently positioned in bed for comfort and safety; call light within reach. Denies pain or discomfort at this time. Will continue to monitor.

## 2019-06-19 NOTE — CARE PLAN
Problem: Safety  Goal: Will remain free from injury  Pt uses call light consistently and appropriately. Waits for assistance does not attempt self transfer this shift. Able to verbalize needs.       Problem: Pain Management  Goal: Pain level will decrease to patient's comfort goal  Pt. c/o headache once during this shift. PRN Fioricet given with positive relief.

## 2019-06-19 NOTE — REHAB-CM IDT TEAM NOTE
Case Management    DC Planning   DC destination/dispostion:  Home w/ supportive family. Single story home in [place].      Referrals:   Will need follow up with  Neuro opthalmology.  Recommendations to obtain a PCP      DC Needs: Anticipate out pt therapy. TBI education. Family training.     Barriers to discharge: Memory deficits; impulsivity; diplopia Funcitional mobility deficits. Pt does not have a PCP.      Strengths: Pleasant, cooperative, motivated, independent PLOF; works FT, and drives.     Section completed by:  JENIFFER Luis, [facility]
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
## 2019-06-19 NOTE — THERAPY
Speech Language Pathology  Daily Treatment     Patient Name: Tuesday Rehab  Age:  43 y.o., Sex:  male  Medical Record #: [id number]  Today's Date: 6/19/2019     Subjective    Pt agreeable to extra ST this date. Pleasant and cooperative, reports vision is not improving at this time. Pt wearing eye patch.     Objective       06/19/19 1004   Cognition   Executive Functioning / Organization Minimal (4)   Interdisciplinary Plan of Care Collaboration   IDT Collaboration with  Speech Therapist   Patient Position at End of Therapy Seated;In Bed   Collaboration Comments POC with primary ST   SLP Total Time Spent   SLP Individual Total Time Spent (Mins) 30   Charge Group   SLP Cognitive Skill Development 2       FIM Eating Score:  6 - Modified Independent  Eating Description:       FIM Comprehension Score:  6 - Modified Independent  Comprehension Description:  Adaptive equipment, Verbal cues    FIM Expression Score:  6 - Modified Independent  Expression Description:  Verbal cueing    FIM Social Interaction Score:  7 - Independent  Social Interaction Description:       FIM Problem Solving Score:  5 - Standby Prompting/Supervision or Set-up  Problem Solving Description:  Verbal cueing, Therapy schedule, Increased time    FIM Memory Score:  4 - Minimal Assistance  Memory Description:  Verbal cueing, Therapy schedule      Assessment    Pt completing second portion of financial mngt task completed in prior ST session. Pt required MIN cues to inferencing to complete with 83% accuracy. Pt benefits from visual cues for organization in order to infer check numbers and balance of register when exact information not given.     Plan    Continue to target exec function, reasoning.

## 2019-06-19 NOTE — THERAPY
Occupational Therapy  Daily Treatment     Patient Name: Tuesday Rehab  Age:  43 y.o., Sex:  male  Medical Record #: [id number]  Today's Date: 2019     Precautions  Precautions: Swallow Precautions ( See Comments)  Comments: diplopia, eye patch/glasses for vision assist    Safety   ADL Safety : Requires Supervision for Safety  Bathroom Safety: Requires Supervision for Safety    Subjective     Objective       19 0831   Precautions   Precautions Swallow Precautions ( See Comments)   Comments diplopia, eye patch/glasses for vision assist   Vitals   O2 Delivery None (Room Air)   Pain   Intervention Declines   Pain 0 - 10 Group   Pain Rating Scale (NPRS) 0   Non Verbal Descriptors   Non Verbal Scale  Calm   Cognition    Level of Consciousness Alert   Balance   Comments Pt standing on bosu ball via CGA. Pt instructed to toss 4# medicine ball to therapist 5ft away. While tossing ball, pt required to go thorugh alphabet and say an animal that begins with each letter. Pt requried VQs for 10/26 letters and increased time. LOB self corrected throughout activity. No RBs   Interdisciplinary Plan of Care Collaboration   Patient Position at End of Therapy Seated;Call Light within Reach;Tray Table within Reach;Phone within Reach     Visual Scanning Activity - Pt seated at table and instructed to go through words on a line and [place] the letters in alphabetical order (find the A's then go back and scan for the B's, etc). Pt completed 2 lines with a total of 59 letters while wearing the patch over the L eye. Pt required increased time and missed 2 letters. Pt then completed 1 line with a total of 30 letters w/o the eye patch. Noted pt required more time to scan w/o the patch than w/ the patch and pt had to move head to compensate for scanning. Pt missed 1 letter.     FIM Bathing Score:  5 - Standby Prompting/Supervision or Set-up  Bathing Description:       FIM Upper Body Dressin - Modified Independent  Upper Body  Dressing Description:  Increased time    FIM Lower Body Dressing Score:  5 - Standby Prompting/Supervsion or Set-up  Lower Body Dressing Description:  Increased time, Supervision for safety    FIM Tub/Shower Transfers Score:  5 - Standby Prompting/Supervision or Set-up  Tub/Shower Transfers Description:  Supervision for safety, Increased time      Assessment    Pt alert and cooperative w/ tx session. Pt required sup for ADL routine for safety (pt tried to thread legs out of pants while standing). During visual scanning activity pt required increased time to scan secondary to diplopia.     Plan    Cont to address safety awareness/impulsivity, higher level balance, endurance for ADL/IADL completion

## 2019-06-19 NOTE — THERAPY
Speech Language Pathology  Daily Treatment     Patient Name: Tuesday Rehab  Age:  43 y.o., Sex:  male  Medical Record #: [id number]  Today's Date: 6/19/2019     Subjective    Pt was pleasant and cooperative during this ST session      Objective       06/19/19 1301   WPTAS ([place] Post-traumatic Amnesia Scale)   How old are you? 1   What is your date of birth? 1   What month are we in? 1   What time of day is it? (morning, noon, or night) 1   What day of the week is it? 1   What year are we in? 1   What is the name of this place 1   Do you remember me? 0   What is my name? 1   Target Picture 1 1   Target Picture 2 1   Target Picture 3 1   Orientation Score 7   Recall Score 4   Total Score 11   Still in post-traumatic amnesia? Yes   SLP Total Time Spent   SLP Individual Total Time Spent (Mins) 60   Charge Group   SLP Cognitive Skill Development 4       Assessment    Min cues required for pt to locate the ST office from his room, pt was able to locate his room from ST independently.  WPTAS completed, pt scored and 11/12, continue with picture set 2 for tomorrow.  Pt completed an attention and memory task (Keeping in Mind) independently to achieve 100% accuracy.  Pt started working on a complex problem solving and organization task (Cognitive Task #4 from level 3 money management binder) and required mod A to organize information and to problem solve how to get the individual price as well as the group price.      Plan    Continue WPTAS and cognitive task #4
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
## 2019-06-20 PROCEDURE — 97112 NEUROMUSCULAR REEDUCATION: CPT

## 2019-06-20 PROCEDURE — 99233 SBSQ HOSP IP/OBS HIGH 50: CPT | Performed by: PHYSICAL MEDICINE & REHABILITATION

## 2019-06-20 PROCEDURE — 700102 HCHG RX REV CODE 250 W/ 637 OVERRIDE(OP): Performed by: PHYSICAL MEDICINE & REHABILITATION

## 2019-06-20 PROCEDURE — 97530 THERAPEUTIC ACTIVITIES: CPT

## 2019-06-20 PROCEDURE — 97535 SELF CARE MNGMENT TRAINING: CPT

## 2019-06-20 PROCEDURE — 97110 THERAPEUTIC EXERCISES: CPT

## 2019-06-20 PROCEDURE — G0515 COGNITIVE SKILLS DEVELOPMENT: HCPCS

## 2019-06-20 PROCEDURE — 97116 GAIT TRAINING THERAPY: CPT

## 2019-06-20 PROCEDURE — 770010 HCHG ROOM/CARE - REHAB SEMI PRIVAT*

## 2019-06-20 PROCEDURE — A9270 NON-COVERED ITEM OR SERVICE: HCPCS | Performed by: PHYSICAL MEDICINE & REHABILITATION

## 2019-06-20 RX ADMIN — ASPIRIN 81 MG 81 MG: 81 TABLET ORAL at 07:50

## 2019-06-20 RX ADMIN — VITAMIN D, TAB 1000IU (100/BT) 2000 UNITS: 25 TAB at 07:50

## 2019-06-20 RX ADMIN — BUTALBITAL, ACETAMINOPHEN AND CAFFEINE 2 TABLET: 50; 325; 40 TABLET ORAL at 08:25

## 2019-06-20 ASSESSMENT — ACTIVITIES OF DAILY LIVING (ADL)
SHOWER_TRANSFER_LEVEL_OF_ASSIST: ABLE TO COMPLETE SHOWER TRANSFER WITHOUT ASSIST
TOILET_TRANSFER_LEVEL_OF_ASSIST: ABLE TO COMPLETE TOILET TRANSFER WITHOUT ASSIST
TOILETING_LEVEL_OF_ASSIST: ABLE TO COMPLETE TOILETING WITHOUT ASSIST

## 2019-06-20 ASSESSMENT — PATIENT HEALTH QUESTIONNAIRE - PHQ9
2. FEELING DOWN, DEPRESSED, IRRITABLE, OR HOPELESS: NOT AT ALL
1. LITTLE INTEREST OR PLEASURE IN DOING THINGS: NOT AT ALL
2. FEELING DOWN, DEPRESSED, IRRITABLE, OR HOPELESS: NOT AT ALL
SUM OF ALL RESPONSES TO PHQ9 QUESTIONS 1 AND 2: 0
1. LITTLE INTEREST OR PLEASURE IN DOING THINGS: NOT AT ALL
SUM OF ALL RESPONSES TO PHQ9 QUESTIONS 1 AND 2: 0

## 2019-06-20 NOTE — CARE PLAN
Problem: Dressing  Goal: STG-Within one week, patient will dress LB  1) Individualized Goal:  Mod I with LB dressing including set up and retrieval of clothing  2) Interventions:  OT Self Care/ADL, OT Cognitive Skill Dev, OT Neuro Re-Ed/Balance, OT Therapeutic Activity, OT Evaluation and OT Therapeutic Exercise     Outcome: MET Date Met: 06/20/19  Mod I including set up and retrieval of clothing - showed appropriate safety awareness     Problem: Toileting  Goal: STG-Within one week, patient will complete toileting tasks  1) Individualized Goal: Mod I with toileting tasks  2) Interventions: OT Self Care/ADL, OT Cognitive Skill Dev, OT Neuro Re-Ed/Balance, OT Therapeutic Activity, OT Evaluation and OT Therapeutic Exercise       Outcome: MET Date Met: 06/20/19      Problem: Functional Transfers  Goal: STG-Within one week, patient will transfer to toilet  1) Individualized Goal: Mod I with toilet transfers  2) Interventions:  OT Self Care/ADL, OT Cognitive Skill Dev, OT Neuro Re-Ed/Balance, OT Therapeutic Activity, OT Evaluation and OT Therapeutic Exercise       Outcome: MET Date Met: 06/20/19      Problem: OT Long Term Goals  Goal: LTG-By discharge, patient will complete basic self care tasks  1) Individualized Goal:  With  Modified independence  2) Interventions:  OT Group Therapy, OT Self Care/ADL, OT Cognitive Skill Dev, OT Community Reintegration, OT Manual Ther Technique, OT Neuro Re-Ed/Balance, OT Sensory Int Techniques, OT Therapeutic Activity, OT Evaluation and OT Therapeutic Exercise   Outcome: MET Date Met: 06/20/19    Goal: LTG-By discharge, patient will perform bathroom transfers  1) Individualized Goal:  With  Modified independence  2) Interventions:  OT Group Therapy, OT Self Care/ADL, OT Cognitive Skill Dev, OT Community Reintegration, OT Manual Ther Technique, OT Neuro Re-Ed/Balance, OT Sensory Int Techniques, OT Therapeutic Activity, OT Evaluation and OT Therapeutic Exercise   Outcome: MET Date Met:  06/20/19
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
## 2019-06-20 NOTE — REHAB-COLLABORATIVE ONGOING IDT TEAM NOTE
Weekly Interdisciplinary Team Conference Note    Weekly Interdisciplinary Team Conference # 2  Date:  6/20/2019    Clinicians present and reporting at team conference include the following:   MD: PHYLLIS New MD    RN:  Lesly Bustos RN    PT:   Jeremiah Glynn, PT, DPT  OT:  Angel Chandra MS OTR/L    ST:  Ankit Kruger MS, CCC-SLP  CM:  Yanelis Zimmerman, LSW, CCM  REC:  Jimmy Sanderson, BABATUNDE  RT:  Not Applicable  RPh:  Hunter Brown [facility]  Other:   Sissy Mercadomarkus Frias  w/ Workman's Comp  All reporting clinicians have a working knowledge of this patient's plan of care.    Targeted DC Date:  6/20/2019.     Medical    Patient Active Problem List    Diagnosis Date Noted   • Intracranial hematoma (HCC) 06/04/2019     Priority: High   • Occlusion of left vertebral artery 06/04/2019     Priority: High   • Oropharyngeal dysphagia 06/06/2019     Priority: Medium   • Skull fracture (HCC) 06/04/2019     Priority: Medium   • Multiple facial fractures, open, initial encounter (HCC) 06/04/2019     Priority: Medium   • Facial laceration 06/11/2019     Priority: Low   • Bilateral pulmonary contusion 06/04/2019     Priority: Low   • Lung nodule 06/04/2019     Priority: Low   • Traumatic brain injury (HCC) 06/11/2019     Results     ** No results found for the last 24 hours. **           Nursing  Diet/Nutrition:  Regular and Thin Liquids  Medication Administration:  Whole with Liquid Wash  % consumed at meals in last 24 hours:  Consumed % of meals per documentation.  Meal/Snack Consumption for the past 24 hrs:   Oral Nutrition   06/19/19 1915 Dinner;Between 25-50% Consumed   06/19/19 1153 Between 25-50% Consumed   06/19/19 0800 Breakfast;Between % Consumed       Snack schedule:  None  Appetite:  Good  Fluid Intake/Output in past 24 hours: In: 840 [P.O.:840]  Out: -   Admit Weight:  Weight: 73 kg (161 lb)  Weight Last 7 Days   [67.8 kg (149 lb 6.4 oz)] 67.8 kg (149 lb 6.4 oz) (06/16 1300)    Pain Issues:    Location:   Head (06/19 6205)  Lateral (06/19 0729)         Severity:  neither Mild nor Moderate   Scheduled pain medications:  None     PRN pain medications used in last 24 hours:  None  Fioricet  Non Pharmacologic Interventions:  rest  Effectiveness of pain management plan:  good=patient states acceptable comfort after interventions    Bowel:    Bowel Assist Initial Score:  5 - Standby Prompting/Supervision or Set-up  Bowel Assist Current Score:  6 - Modified Independent  Bowl Accidents in last 7 days:     Last bowel movement: 06/19/19 (per pt )  Stool Description: Large     Usual bowel pattern:  daily  Scheduled bowel medications:  None  PRN bowel medications used in last 24 hours:  None  Nursing Interventions:  Increased time, Supervision  Effectiveness of bowel program:   good=regular, predictable, controlled emptying of bowel  Bladder:    Bladder Assist Initial Score:  4 - Minimal Assistance  Bladder Assist Current Score:  6 - Modified Independent  Bladder Accidents in last 7 days:     PVR range for past 24-48 hours: -- ()  Intermittent Catheterization:    Medications affecting bladder:  None    Time void schedule/voiding pattern:  Voiding every 2-4 hours  Interventions:  Increased time, Supervision  Effectiveness of bladder training:  Good=regular, predictable, emptying of bladder, patient initiates time voiding    Wound:         Patient Lines/Drains/Airways Status    Active Current Wounds     Name: Placement date: Placement time: Site: Days:    Wound 06/04/19 Head 06/04/19   1037      15                   Interventions:  PAKO  Effectiveness of intervention:  wound is improving     Sleep/wake cycle:   Average hours slept:  Sleeps 4-6 hours without waking  Sleep medication usage:  None    Patient/Family Training/Education:  General Self Care, Pain Management, Safety and Skin Care  Strengths: Alert and oriented, Able to follow instructions and Pleasant and cooperative   Barriers:   Generalized weakness            Nursing  Problems           Problem: Bowel/Gastric:     Goal: Normal bowel function is maintained or improved           Goal: Will not experience complications related to bowel motility             Problem: Communication     Goal: The ability to communicate needs accurately and effectively will improve             Problem: Discharge Barriers/Planning     Goal: Patient's continuum of care needs will be met             Problem: Infection     Goal: Will remain free from infection             Problem: Knowledge Deficit     Goal: Knowledge of disease process/condition, treatment plan, diagnostic tests, and medications will improve           Goal: Knowledge of the prescribed therapeutic regimen will improve             Problem: Pain Management     Goal: Pain level will decrease to patient's comfort goal     Flowsheet:     Taken at 06/14/19 2306    Pain Rating Scale (NPRS) 3 - Sometimes distracts me by SUDHIR PanchalN.    Non Verbal Scale  Calm by SALVADOR Panchal.N.    Comfort Goal Comfort at Rest;Sleep Comfortably by SALVADOR Panchal.N.    Taken at 06/13/19 0010    Pain Rating Scale (NPRS) 0 - No Pain by Sedrick Galvan R.N.    Non Verbal Scale  Calm by Sedrick Galvan R.N.    Comfort Goal Comfort at Rest;Sleep Comfortably by Sedrick Galvan R.N.                  Problem: Safety     Goal: Will remain free from injury           Goal: Will remain free from falls             Problem: Venous Thromboembolism (VTW)/Deep Vein Thrombosis (DVT) Prevention:     Goal: Patient will participate in Venous Thrombosis (VTE)/Deep Vein Thrombosis (DVT)Prevention Measures                  Long Term Goals:   At discharge patient will be able to function safely at home and in the community with support.    Section completed by:  Tahira March R.N.      Reviewed by Lesly Bustos R.N.          Mobility  Bed mobility:   Mod I  Bed /Chair/Wheelchair Transfer Initial:  4 - Minimal Assistance  Bed /Chair/Wheelchair Transfer  "Current:  6 - Modified Independent   Bed/Chair/Wheelchair Transfer Description:   (HOB elevated; eye patch donned)  Walk Initial:  4 - Minimal Assistance  Walk Current:  5 - Standby Prompting/Supervision or Set-up   Walk Description:   (eye patch donned throughout; SPV outdoors today with no fatigue limitations up to 1000 feet; SPV indoors for 2 reps but 1 small LOB quickly self-corrected with stepping strategy while in bathroom; cueing for path finding prn)  Wheelchair Initial:  5 - Standby Prompting/Supervision or Set-up  Wheelchair Current:  0 - Not tested,unsafe activity   Wheelchair Description:   (SPV with BUEs x150 ft, slow pace)  Stairs Initial:  4 - Minimal Assistance  Stairs Current: 5 - Standby Prompting/Supervision or Set-up   Stairs Description: Hand rails, Supervision for safety, Ascends/descends 12 to 14 steps  Patient/Family Training/Education:  Ongoing  DME/DC Recommendations:  1 day; no AD; Intermittent SPV; no driving; Outpatient PT, cardiac f/u, neuro-opthomology f/u  Strengths:  Adequate strength, Alert and oriented, Effective communication skills, Good carryover of learning, Independent PLOF, Making steady progress towards goals, Manages pain appropriately, Motivated for self care and independence, Pleasant and cooperative and Willingly participates in therapeutic activities  Barriers:   Impulsive, Poor insight/denial of deficits and Other: prior cardiac limitations; diplopia; impaired L eye vision  # of short term goals set= 3  # of short term goals met= 2       Physical Therapy Problems           Problem: Mobility     Dates: Start: 06/12/19       Goal: STG-Within one week, patient will ambulate up/down flight of stairs     Dates: Start: 06/12/19       Description: 1) Individualized goal: 12 - 6\" steps with SPV w/o use of handrails.   2) Interventions:  PT Group Therapy, PT E Stim Attended, PT Gait Training, PT Self Care/Home Eval, PT Therapeutic Exercises, PT TENS Application, PT Neuro " Re-Ed/Balance, PT Aquatic Therapy, PT Therapeutic Activity, PT Manual Therapy and PT Evaluation         Note:     Goal Note filed on 06/20/19 0836 by Rubio Glynn, PT    Goal: STG-Within one week, patient will ambulate up/down flight of stairs  Outcome: NOT MET  SPV but uses handrails                Goal: STG-Within one week, patient will ambulate community distances     Dates: Start: 06/20/19       Description: 1) Individualized goal: x500 ft indoors and outdoors over uneven surfaces, MOD INDEP w/o AD for mobility in community.   2) Interventions: PT Group Therapy, PT E Stim Attended, PT Gait Training, PT Self Care/Home Eval, PT Therapeutic Exercises, PT TENS Application, PT Neuro Re-Ed/Balance, PT Aquatic Therapy, PT Therapeutic Activity, PT Manual Therapy and PT Evaluation                  Problem: Mobility Transfers     Dates: Start: 06/12/19       Goal: STG-Within one week, patient will transfer bed to chair     Dates: Start: 06/20/19       Description: 1) Individualized goal: MOD INDEP bed<>chair, no AD.  2) Interventions: PT Group Therapy, PT E Stim Attended, PT Gait Training, PT Self Care/Home Eval, PT Therapeutic Exercises, PT TENS Application, PT Neuro Re-Ed/Balance, PT Aquatic Therapy, PT Therapeutic Activity, PT Manual Therapy and PT Evaluation                Goal: STG-Within one week, patient will transfer in/out of car     Dates: Start: 06/20/19       Description: 1) Individualized goal: MOD INDEP bed<>chair, no AD.  2) Interventions: PT Group Therapy, PT E Stim Attended, PT Gait Training, PT Self Care/Home Eval, PT Therapeutic Exercises, PT TENS Application, PT Neuro Re-Ed/Balance, PT Aquatic Therapy, PT Therapeutic Activity, PT Manual Therapy and PT Evaluation                  Problem: PT-Long Term Goals     Dates: Start: 06/12/19       Goal: LTG-By discharge, patient will ambulate     Dates: Start: 06/12/19       Description: 1) Individualized goal: x500 ft indoors and outdoors over uneven  "surfaces, MOD INDEP w/o AD for mobility in community.   2) Interventions:  PT Group Therapy, PT E Stim Attended, PT Gait Training, PT Self Care/Home Eval, PT Therapeutic Exercises, PT TENS Application, PT Neuro Re-Ed/Balance, PT Aquatic Therapy, PT Therapeutic Activity, PT Manual Therapy and PT Evaluation               Goal: LTG-By discharge, patient will transfer one surface to another     Dates: Start: 06/12/19       Description: 1) Individualized goal: MOD INDEP bed<>chair, no AD.  2) Interventions:  PT Group Therapy, PT E Stim Attended, PT Gait Training, PT Self Care/Home Eval, PT Therapeutic Exercises, PT TENS Application, PT Neuro Re-Ed/Balance, PT Aquatic Therapy, PT Therapeutic Activity, PT Manual Therapy and PT Evaluation               Goal: LTG-By discharge, patient will ambulate up/down flight of stairs     Dates: Start: 06/12/19       Description: 1) Individualized goal: 12- 6\" steps MOD INDEP w/o use of handrails for increased community mobility.  2) Interventions:  PT Group Therapy, PT E Stim Attended, PT Gait Training, PT Self Care/Home Eval, PT Therapeutic Exercises, PT TENS Application, PT Neuro Re-Ed/Balance, PT Aquatic Therapy, PT Therapeutic Activity, PT Manual Therapy and PT Evaluation                       Section completed by:  Rubio Glynn, PT       Activities of Daily Living  Eating Initial:  5 - Standby Prompting/Supervision or Set-up  Eating Current:  6 - Modified Independent   Eating Description:  Insert dentures  Grooming Initial:  5 - Standby Prompting/Supervision or Set-up  Grooming Current:  5 - Standby Prompting/Supervision or Set-up   Grooming Description:  Supervision for safety (standing at sink for oral care)  Bathing Initial:  5 - Standby Prompting/Supervision or Set-up  Bathing Current:  5 - Standby Prompting/Supervision or Set-up   Bathing Description:  Tub bench, Hand held shower, Increased time, Supervision for safety, Grab bar  Upper Body Dressing Initial:  5 - " Standby Prompting/Supervision or Set-up  Upper Body Dressing Current:  6 - Modified Independent   Upper Body Dressing Description:  Increased time  Lower Body Dressing Initial:  4 - Minimal Assistance  Lower Body Dressing Current:  5 - Standby Prompting/Supervsion or Set-up   Lower Body Dressing Description:  5 - Standby Prompting/Supervsion or Set-up  Toileting Initial:  4 - Minimal Assistance  Toileting Current:  5 - Standby Prompting/Supervision or Set-up   Toileting Description:  Grab bar, Supervision for safety  Toilet Transfer Initial:  4 - Minimal Assistance  Toilet Transfer Current:  5 - Standby Prompting/Supervision or Set-up   Toilet Transfer Description:  5 - Standby Prompting/Supervision or Set-up  Tub / Shower Transfer Initial:  4 - Minimal Assistance  Tub / Shower Transfer Current:  5 - Standby Prompting/Supervision or Set-up   Tub / Shower Transfer Description:  Supervision for safety, Increased time  IADL:  Ongoing  Family Training/Education:  TBD   DME/DC Recommendations:  TBD    Strengths:  Independent PLOF, Motivated for self care and independence, Pleasant and cooperative, Supportive family and Willingly participates in therapeutic activities  Barriers:  Impulsive and Other: Diplopia     # of short term goals set= 4    # of short term goals met= 4          Occupational Therapy Goals           Problem: OT Long Term Goals     Dates: Start: 06/12/19       Goal: LTG-By discharge, patient will complete basic self care tasks     Dates: Start: 06/12/19       Description: 1) Individualized Goal:  With  Modified independence  2) Interventions:  OT Group Therapy, OT Self Care/ADL, OT Cognitive Skill Dev, OT Community Reintegration, OT Manual Ther Technique, OT Neuro Re-Ed/Balance, OT Sensory Int Techniques, OT Therapeutic Activity, OT Evaluation and OT Therapeutic Exercise           Goal: LTG-By discharge, patient will perform bathroom transfers     Dates: Start: 06/12/19       Description: 1)  Individualized Goal:  With  Modified independence  2) Interventions:  OT Group Therapy, OT Self Care/ADL, OT Cognitive Skill Dev, OT Community Reintegration, OT Manual Ther Technique, OT Neuro Re-Ed/Balance, OT Sensory Int Techniques, OT Therapeutic Activity, OT Evaluation and OT Therapeutic Exercise           Goal: LTG-By discharge, patient will complete basic home management     Dates: Start: 06/12/19       Description: 1) Individualized Goal:  With  Modified independence  2) Interventions:  OT Group Therapy, OT Self Care/ADL, OT Cognitive Skill Dev, OT Community Reintegration, OT Manual Ther Technique, OT Neuro Re-Ed/Balance, OT Sensory Int Techniques, OT Therapeutic Activity, OT Evaluation and OT Therapeutic Exercise                 Section completed by:  Dorothy Ko, Student       Cognitive Linquistic Functions  Comprehension Initial:  5 - Stand-by Prompting/Supervision or Set-up  Comprehension Current:  6 - Modified Independent   Comprehension Description:  Adaptive equipment, Verbal cues  Expression Initial:  5 - Stand-by Prompting/Supervision or Set-up  Expression Current:  6 - Modified Independent   Expression Description:  Verbal cueing  Social Interaction Initial:  4 - Minimal Assistance  Social Interaction Current:  7 - Independent   Social Interaction Description:  Verbal cues  Problem Solving Initial:  4 - Minimal Assistance  Problem Solving Current:  5 - Standby Prompting/Supervision or Set-up   Problem Solving Description:  Verbal cueing, Therapy schedule, Increased time  Memory Initial:  3 - Moderate Assistance  Memory Current:  4 - Minimal Assistance   Memory Description:  Verbal cueing, Therapy schedule  Executive Functioning / Organization Initial:  To Be Assessed  Executive Functioning / Organization Current:  Minimal (4)   Executive Functioning Desciption:  Verbal cues   Swallowing  Swallowing Status:  All swallowing goals met at this time, pt currently tolerating a regular texture diet and  thin liquids   Orders Placed This Encounter   Procedures   • Diet Order Regular     Standing Status:   Standing     Number of Occurrences:   1     Order Specific Question:   Diet:     Answer:   Regular [1]     Order Specific Question:   Consistency/Fluid modifications:     Answer:   Thin Liquids [3]     Behavior Modification Plan  Analyze tasks (break down into smaller steps)  Assistive Technology  Memory aides: and Low tech: Calendar, planner, schedule, alarms/timers, pill organizer, post-it notes, lists  Family Training/Education:  To be completed   DC Recommendations:   Outpatient ST   Strengths:  Able to follow instructions, Effective communication skills, Independent PLOF, Making steady progress towards goals, Motivated for self care and independence, Pleasant and cooperative, Supportive family and Willingly participates in therapeutic activities  Barriers:  Poor carryover of learning and Other: executive functions   # of short term goals set=4  # of short term goals met=1       Speech Therapy Problems           Problem: Memory STGs     Dates: Start: 06/14/19       Goal: STG-Within one week, patient will     Dates: Start: 06/14/19       Description: 1) Individualized goal:  Initiate writing down daily information in a memory notebook with mod cues in 3/3 occasions   2) Interventions:  SLP Speech Language Treatment, SLP Self Care / ADL Training , SLP Cognitive Skill Development and SLP Group Treatment       Note:     Goal Note filed on 06/20/19 1112 by Ankit Kruger MS,CCC-SLP    Goal: STG-Within one week, patient will  Outcome: NOT MET  Mod cues required for memory                   Problem: Problem Solving STGs     Dates: Start: 06/12/19       Goal: STG-Within one week, patient will     Dates: Start: 06/12/19       Description: 1) Individualized goal:  Achieve a score of 12/12 in 3 consecutive sessions on the [place] PTA scale   2) Interventions:  SLP Speech Language Treatment, SLP Self Care / ADL Training ,  SLP Cognitive Skill Development and SLP Group Treatment       Note:     Goal Note filed on 06/20/19 1112 by Ankit Kruger MS,CCC-SLP    Goal: STG-Within one week, patient will  Outcome: NOT MET  Continue WPTAS                Goal: STG-Within one week, patient will     Dates: Start: 06/14/19       Description: 1) Individualized goal:  Complete executive function tasks given min A to achieve 80% accuracy or greater  2) Interventions:  SLP Speech Language Treatment, SLP Self Care / ADL Training , SLP Cognitive Skill Development and SLP Group Treatment       Note:     Goal Note filed on 06/20/19 1112 by Ankit Kruger MS,CCC-SLP    Goal: STG-Within one week, patient will  Outcome: NOT MET  Min-mod A required for pt to complete executive function tasks with 80%   accuracy                   Problem: Speech/Swallowing LTGs     Dates: Start: 06/12/19       Goal: LTG-By discharge, patient will solve complex problem     Dates: Start: 06/12/19       Description: 1) Individualized goal:  Given spv for a safe discharge home  2) Interventions:  SLP Speech Language Treatment, SLP Self Care / ADL Training , SLP Cognitive Skill Development and SLP Group Treatment                    Section completed by:  Ankit Kruger MS,CCC-SLP       Recreation/Community     Leisure Competence Measure  Leisure Awareness: Independent  Leisure Attitude: Independent  Leisure Skills: Independent  Cultural / Social Behaviors: Independent  Interpersonal Skills: Independent  Community Integration Skills: Independent  Social Contact: Independent  Community Participation: Independent    Strengths:  Able to follow instructions, Effective communication skills, Good balance, Motivated for self care and independence, Pleasant and cooperative, Supportive family and Willingly participates in therapeutic activities  Barriers:  Poor carryover of learning  # of short term goals set=2  # of short term goals met=1  Will focus on the planning of an outing involving him  way finding in a store many different items in different parts of the store. Has been tolerating balance while holding objects up to 5 gallons of water while watering the garden. He was able to community ambulate on uneven surfaces including grass and gravel.          Recreation Therapy Problems           Problem: Recreation Therapy     Dates: Start: 06/14/19       Goal: STG-Within one week, patient will demonstrate leisure problem solving     Dates: Start: 06/14/19       Description: By performing a cognitive leisure activity with 50% accuracy.            Goal: STG-Within one week, patient will actively engage in planning of community re-integration outing     Dates: Start: 06/14/19             Goal: LTG-By discharge, patient will demonstrate leisure problem solving     Dates: Start: 06/14/19       Description: By performing a cognitive leisure activity with 50% accuracy.            Goal: LTG-By discharge, patient will participate in a community re-integration outing     Dates: Start: 06/14/19                   Section completed by:  MARÍA BurtonS       REHAB-Pharmacy IDT Team Note by Ulises Mendez RPH at 6/19/2019  5:43 PM  Version 1 of 1    Author:  Ulises Mendez RPH Service:  (none) Author Type:  Pharmacist    Filed:  6/19/2019  5:43 PM Date of Service:  6/19/2019  5:43 PM Status:  Signed    :  Ulises Mendez RPH (Pharmacist)         Pharmacy   Pharmacy  Antibiotics/Antifungals/Antivirals:  Medication:      Active Orders     None        Route:         None  Stop Date:  N/A  Reason:   Antihypertensives/Cardiac:  Medication:    Orders (72h ago through future)    Start     Ordered    06/11/19 1629  hydrALAZINE (APRESOLINE) tablet 25 mg  EVERY 8 HOURS PRN      06/11/19 1629        Patient Vitals for the past 24 hrs:   BP Pulse   06/19/19 1612 141/78 91   06/19/19 0647 125/81 62   06/18/19 1833 122/74 79       Anticoagulation:  Medication:  Not applicable  INR:      Other key  medications:      A review of the medication list reveals no issues at this time.     Section completed by:  Ulises Mendez DAVID[[email or web address].1]        Attribution Bassett     WR.1 - Ulises Mendez RP on 6/19/2019  5:43 PM                    DC Planning   DC destination/dispostion:  Home w/ supportive family. Single story home in [place].      Referrals:   Will need follow up with  Neuro opthalmology.  Recommendations to obtain a PCP      DC Needs: Anticipate out pt therapy. TBI education. Family training.     Barriers to discharge: Memory deficits; impulsivity; diplopia Funcitional mobility deficits. Pt does not have a PCP.      Strengths: Pleasant, cooperative, motivated, independent PLOF; works FT, and drives.     Section completed by:  JENIFFER Luis, [facility]   Summary:  Difficulty w/ way finding, decreased memory and balance, impulsive @ times, sup w/ adl's and gait, follow up neuro-opthalmology; out pt PT/OT/SLP, recommendations made not to drive; aspirin @ dc; pt will have 24 hour care @ home.  Dc 6/20/2019.  Workman's comp indicates claim is still pending awaiting medical records from [facility].  DME-shower chair recommended.     Physician Summary  PHYLLIS New MD  participated and led team conference discussion.

## 2019-06-20 NOTE — REHAB-PHARMACY IDT TEAM NOTE
Pharmacy   Pharmacy  Antibiotics/Antifungals/Antivirals:  Medication:      Active Orders     None        Route:         None  Stop Date:  N/A  Reason:   Antihypertensives/Cardiac:  Medication:    Orders (72h ago through future)    Start     Ordered    06/11/19 1629  hydrALAZINE (APRESOLINE) tablet 25 mg  EVERY 8 HOURS PRN      06/11/19 1629        Patient Vitals for the past 24 hrs:   BP Pulse   06/19/19 1612 141/78 91   06/19/19 0647 125/81 62   06/18/19 1833 122/74 79       Anticoagulation:  Medication:  Not applicable  INR:      Other key medications:      A review of the medication list reveals no issues at this time.     Section completed by:  Ulises Mendez RPH

## 2019-06-20 NOTE — CARE PLAN
Problem: Safety  Goal: Will remain free from injury  Patient uses call light appropriately, does not attempt to self transfer. Call light and belongings within reach, bed in lowest and locked position, bed rails up x2, and non skid socks on. Hourly rounding in place.    Problem: Pain Management  Goal: Pain level will decrease to patient's comfort goal  No complaints of pain or discomfort so far this shift, pt sleeping well. Will continue to monitor.

## 2019-06-20 NOTE — THERAPY
Physical Therapy   Daily Treatment     Patient Name: Tuesday Rehab  Age:  43 y.o., Sex:  male  Medical Record #: [id number]  Today's Date: 6/20/2019     Precautions  Precautions: Other (See Comments)  Comments: diplopia, eye patch/glasses for vision assist, Mod I ambulation in room only    Subjective    Patient agreeable to PT.     Objective       06/20/19 1231   Precautions   Precautions Other (See Comments)   Comments diplopia, eye patch/glasses for vision assist, Mod I ambulation in room only   Vitals   O2 (LPM) 0   O2 Delivery None (Room Air)   Sitting Lower Body Exercises   Nustep Resistance Level 7  (10', 5' and 5'.  Avg 74 spm.  117 Calories.  Cueing, SPV.)   Bed Mobility    Supine to Sit Modified Independent   Sit to Supine Modified Independent   Sit to Stand Modified Independent   Scooting Modified Independent   Rolling Modified Independent   Neuro-Muscular Treatments   Comments Standing balance for 12 minutes with 2 short 30 sec standing breaks; in // bars but without UE support 95% of time; SLS on Airex pad for 4 sets of 30 sec ea LE but varying eye patch use on L vs R; SBA with R eye covered and SPV with L eye covered.  Performed same task with SPV on firm surface for 2 sets of 30 sec ea LE and varying eye patch side again.   IDT Conference   IDT Conference I have reviewed and discussed the POC and barriers to discharge for this patient.  I am knowledgeable of the patient's needs and have attended the IDT Conference.   Interdisciplinary Plan of Care Collaboration   IDT Collaboration with  (IDT team)   Collaboration Comments roomboard updated. Patient may ambulate in room and to/from toilet using an eye patch; Patient requires SPV with indoor/outdoor ambulation and use cueing and eye patch.  At IDT conference: d/c moved up to tomorrow; discussed low light stimulus in room, limited L eye use with therapies and impaired balance, visual scanning; discussed POC and f/u appt's with IDT team and pt's worker's comp  CM.   PT Total Time Spent   PT Individual Total Time Spent (Mins) 60   PT Charge Group   PT Gait Training 1   PT Therapeutic Exercise 2   PT Neuromuscular Re-Education / Balance 1     Completed mobility FIMs, IRF-Judy, floor recovery, and discussed fall safety.  Discussed likely POC, safety, and Mod I in room only (not showers); no driving at this time; pt's goals and expectations.      FIM Bed/Chair/Wheelchair Transfers Score: 6 - Modified Independent  Bed/Chair/Wheelchair Transfers Description:   (eye patch, increased time with supine to sit only. 2 reps)    FIM Walking Score:  5 - Standby Prompting/Supervision or Set-up  Walking Description:   (SPV indoors and outdoors due to path finding and visual scanning, uses eye patch throughout, Mod I ambulation in room only, no fatigue noted with ambulation over 1500 feet including with SPV over grass)    FIM Wheelchair Score:  0 - Not tested,unsafe activity  Wheelchair Description:       FIM Stairs Score:  6 - Modified Independent  Stairs Description:   (0-1 railings, Mod I, eye patch, 30 stairs or mixed heights)    FIM Toilet Transfer Score:  6 - Modified Independent  Toilet Transfer Description:   (eye patch)    FIM Toiletin - Modified Independent  Toilet Transfer Description:   (eye patch)       Assessment    Patient is ready for next level of care.  Diplopia, visual scanning, safety deficits still are greater than balance and endurance deficits.    Plan    D/c tomorrow

## 2019-06-20 NOTE — THERAPY
"Recreational Therapy  Daily Treatment     Patient Name: Tuesday Rehab  AGE:  43 y.o., SEX:  male  Medical Record #: [id number]  Today's Date: 6/20/2019       Subjective    \"I don't have a garden at home.\"     Objective       06/20/19 1601   Functional Ability Status - Cognitive   Attention Span Remains on Task   Comprehension Follows Three Step Commands   Judgment Able to Make Independent Decisions   Functional Ability Status - Emotional    Affect Appropriate   Mood Appropriate   Behavior Appropriate   Skilled Intervention    Skilled Intervention Gross Motor Leisure;Relaxation / Coping Skills;Community Skills   Skilled Intervention Comments Watering raised flower beds   Interdisciplinary Plan of Care Collaboration   Patient Position at End of Therapy Seated;In Bed;Call Light within Reach;Tray Table within Reach   Treatment Time   Total Time Spent (mins) 30   Procedural Tracking   Procedural Tracking Gross Motor Functional Leisure Skills;Community Skills Development       Assessment    He was taken outside to water the raised beds. He was able to fill up a 5 gallon water bucket and walk to each raised bed without LOB on the uneven surfaces. He was able to walk on both grass and uneven concrete.     Plan    Discharge Pt.   "

## 2019-06-20 NOTE — CARE PLAN
Problem: Speech/Swallowing LTGs  Goal: LTG-By discharge, patient will safely swallow  1) Individualized goal:  The least restrictive diet for safe intake of daily meals   2) Interventions:  SLP Swallowing Dysfunction Treatment, SLP Oral Pharyngeal Evaluation, SLP Video Swallow Evaluation and SLP Group Treatment     Outcome: MET Date Met: 06/20/19  Pt is currently tolerating regular texture food and thin liquids    Problem: Swallowing STGs  Goal: STG-Within one week, patient will  1) Individualized goal:  Tolerate trials of dysphagia 3 textures with dentures without difficulty .   2) Interventions:  SLP Swallowing Dysfunction Treatment, SLP Oral Pharyngeal Evaluation, SLP Video Swallow Evaluation and SLP Group Treatment     Outcome: MET Date Met: 06/20/19  Pt is currently tolerating regular textures and thin liquids     Problem: Problem Solving STGs  Goal: STG-Within one week, patient will  1) Individualized goal:  Achieve a score of 12/12 in 3 consecutive sessions on the [place] PTA scale   2) Interventions:  SLP Speech Language Treatment, SLP Self Care / ADL Training , SLP Cognitive Skill Development and SLP Group Treatment     Outcome: NOT MET  Continue WPTAS  Goal: STG-Within one week, patient will  1) Individualized goal:  Complete executive function tasks given min A to achieve 80% accuracy or greater  2) Interventions:  SLP Speech Language Treatment, SLP Self Care / ADL Training , SLP Cognitive Skill Development and SLP Group Treatment     Outcome: NOT MET  Min-mod A required for pt to complete executive function tasks with 80% accuracy     Problem: Memory STGs  Goal: STG-Within one week, patient will  1) Individualized goal:  Initiate writing down daily information in a memory notebook with mod cues in 3/3 occasions   2) Interventions:  SLP Speech Language Treatment, SLP Self Care / ADL Training , SLP Cognitive Skill Development and SLP Group Treatment     Outcome: NOT MET  Mod cues required for memory

## 2019-06-20 NOTE — REHAB-PT IDT TEAM NOTE
"Physical Therapy   Mobility  Bed mobility:   Mod I  Bed /Chair/Wheelchair Transfer Initial:  4 - Minimal Assistance  Bed /Chair/Wheelchair Transfer Current:  6 - Modified Independent   Bed/Chair/Wheelchair Transfer Description:   (HOB elevated; eye patch donned)  Walk Initial:  4 - Minimal Assistance  Walk Current:  5 - Standby Prompting/Supervision or Set-up   Walk Description:   (eye patch donned throughout; SPV outdoors today with no fatigue limitations up to 1000 feet; SPV indoors for 2 reps but 1 small LOB quickly self-corrected with stepping strategy while in bathroom; cueing for path finding prn)  Wheelchair Initial:  5 - Standby Prompting/Supervision or Set-up  Wheelchair Current:  0 - Not tested,unsafe activity   Wheelchair Description:   (SPV with BUEs x150 ft, slow pace)  Stairs Initial:  4 - Minimal Assistance  Stairs Current: 5 - Standby Prompting/Supervision or Set-up   Stairs Description: Hand rails, Supervision for safety, Ascends/descends 12 to 14 steps  Patient/Family Training/Education:  Ongoing  DME/DC Recommendations:  1 day; no AD; Intermittent SPV; no driving; Outpatient PT, cardiac f/u, neuro-opthomology f/u  Strengths:  Adequate strength, Alert and oriented, Effective communication skills, Good carryover of learning, Independent PLOF, Making steady progress towards goals, Manages pain appropriately, Motivated for self care and independence, Pleasant and cooperative and Willingly participates in therapeutic activities  Barriers:   Impulsive, Poor insight/denial of deficits and Other: prior cardiac limitations; diplopia; impaired L eye vision  # of short term goals set= 3  # of short term goals met= 2       Physical Therapy Problems           Problem: Mobility     Dates: Start: 06/12/19       Goal: STG-Within one week, patient will ambulate up/down flight of stairs     Dates: Start: 06/12/19       Description: 1) Individualized goal: 12 - 6\" steps with SPV w/o use of handrails.   2) " Interventions:  PT Group Therapy, PT E Stim Attended, PT Gait Training, PT Self Care/Home Eval, PT Therapeutic Exercises, PT TENS Application, PT Neuro Re-Ed/Balance, PT Aquatic Therapy, PT Therapeutic Activity, PT Manual Therapy and PT Evaluation         Note:     Goal Note filed on 06/20/19 0836 by Rubio Glynn, PT    Goal: STG-Within one week, patient will ambulate up/down flight of stairs  Outcome: NOT MET  SPV but uses handrails                Goal: STG-Within one week, patient will ambulate community distances     Dates: Start: 06/20/19       Description: 1) Individualized goal: x500 ft indoors and outdoors over uneven surfaces, MOD INDEP w/o AD for mobility in community.   2) Interventions: PT Group Therapy, PT E Stim Attended, PT Gait Training, PT Self Care/Home Eval, PT Therapeutic Exercises, PT TENS Application, PT Neuro Re-Ed/Balance, PT Aquatic Therapy, PT Therapeutic Activity, PT Manual Therapy and PT Evaluation                  Problem: Mobility Transfers     Dates: Start: 06/12/19       Goal: STG-Within one week, patient will transfer bed to chair     Dates: Start: 06/20/19       Description: 1) Individualized goal: MOD INDEP bed<>chair, no AD.  2) Interventions: PT Group Therapy, PT E Stim Attended, PT Gait Training, PT Self Care/Home Eval, PT Therapeutic Exercises, PT TENS Application, PT Neuro Re-Ed/Balance, PT Aquatic Therapy, PT Therapeutic Activity, PT Manual Therapy and PT Evaluation                Goal: STG-Within one week, patient will transfer in/out of car     Dates: Start: 06/20/19       Description: 1) Individualized goal: MOD INDEP bed<>chair, no AD.  2) Interventions: PT Group Therapy, PT E Stim Attended, PT Gait Training, PT Self Care/Home Eval, PT Therapeutic Exercises, PT TENS Application, PT Neuro Re-Ed/Balance, PT Aquatic Therapy, PT Therapeutic Activity, PT Manual Therapy and PT Evaluation                  Problem: PT-Long Term Goals     Dates: Start: 06/12/19       Goal:  "LTG-By discharge, patient will ambulate     Dates: Start: 06/12/19       Description: 1) Individualized goal: x500 ft indoors and outdoors over uneven surfaces, MOD INDEP w/o AD for mobility in community.   2) Interventions:  PT Group Therapy, PT E Stim Attended, PT Gait Training, PT Self Care/Home Eval, PT Therapeutic Exercises, PT TENS Application, PT Neuro Re-Ed/Balance, PT Aquatic Therapy, PT Therapeutic Activity, PT Manual Therapy and PT Evaluation               Goal: LTG-By discharge, patient will transfer one surface to another     Dates: Start: 06/12/19       Description: 1) Individualized goal: MOD INDEP bed<>chair, no AD.  2) Interventions:  PT Group Therapy, PT E Stim Attended, PT Gait Training, PT Self Care/Home Eval, PT Therapeutic Exercises, PT TENS Application, PT Neuro Re-Ed/Balance, PT Aquatic Therapy, PT Therapeutic Activity, PT Manual Therapy and PT Evaluation               Goal: LTG-By discharge, patient will ambulate up/down flight of stairs     Dates: Start: 06/12/19       Description: 1) Individualized goal: 12- 6\" steps MOD INDEP w/o use of handrails for increased community mobility.  2) Interventions:  PT Group Therapy, PT E Stim Attended, PT Gait Training, PT Self Care/Home Eval, PT Therapeutic Exercises, PT TENS Application, PT Neuro Re-Ed/Balance, PT Aquatic Therapy, PT Therapeutic Activity, PT Manual Therapy and PT Evaluation                       Section completed by:  Rubio Glynn, PT     "

## 2019-06-20 NOTE — REHAB-SLP IDT TEAM NOTE
Speech Therapy   Cognitive Linquistic Functions  Comprehension Initial:  5 - Stand-by Prompting/Supervision or Set-up  Comprehension Current:  6 - Modified Independent   Comprehension Description:  Adaptive equipment, Verbal cues  Expression Initial:  5 - Stand-by Prompting/Supervision or Set-up  Expression Current:  6 - Modified Independent   Expression Description:  Verbal cueing  Social Interaction Initial:  4 - Minimal Assistance  Social Interaction Current:  7 - Independent   Social Interaction Description:  Verbal cues  Problem Solving Initial:  4 - Minimal Assistance  Problem Solving Current:  5 - Standby Prompting/Supervision or Set-up   Problem Solving Description:  Verbal cueing, Therapy schedule, Increased time  Memory Initial:  3 - Moderate Assistance  Memory Current:  4 - Minimal Assistance   Memory Description:  Verbal cueing, Therapy schedule  Executive Functioning / Organization Initial:  To Be Assessed  Executive Functioning / Organization Current:  Minimal (4)   Executive Functioning Desciption:  Verbal cues   Swallowing  Swallowing Status:  All swallowing goals met at this time, pt currently tolerating a regular texture diet and thin liquids   Orders Placed This Encounter   Procedures   • Diet Order Regular     Standing Status:   Standing     Number of Occurrences:   1     Order Specific Question:   Diet:     Answer:   Regular [1]     Order Specific Question:   Consistency/Fluid modifications:     Answer:   Thin Liquids [3]     Behavior Modification Plan  Analyze tasks (break down into smaller steps)  Assistive Technology  Memory aides: and Low tech: Calendar, planner, schedule, alarms/timers, pill organizer, post-it notes, lists  Family Training/Education:  To be completed   DC Recommendations:   Outpatient ST   Strengths:  Able to follow instructions, Effective communication skills, Independent PLOF, Making steady progress towards goals, Motivated for self care and independence, Pleasant and  cooperative, Supportive family and Willingly participates in therapeutic activities  Barriers:  Poor carryover of learning and Other: executive functions   # of short term goals set=4  # of short term goals met=1       Speech Therapy Problems           Problem: Memory STGs     Dates: Start: 06/14/19       Goal: STG-Within one week, patient will     Dates: Start: 06/14/19       Description: 1) Individualized goal:  Initiate writing down daily information in a memory notebook with mod cues in 3/3 occasions   2) Interventions:  SLP Speech Language Treatment, SLP Self Care / ADL Training , SLP Cognitive Skill Development and SLP Group Treatment       Note:     Goal Note filed on 06/20/19 1112 by Ankit Kruger MS,CCC-SLP    Goal: STG-Within one week, patient will  Outcome: NOT MET  Mod cues required for memory                   Problem: Problem Solving STGs     Dates: Start: 06/12/19       Goal: STG-Within one week, patient will     Dates: Start: 06/12/19       Description: 1) Individualized goal:  Achieve a score of 12/12 in 3 consecutive sessions on the [place] PTA scale   2) Interventions:  SLP Speech Language Treatment, SLP Self Care / ADL Training , SLP Cognitive Skill Development and SLP Group Treatment       Note:     Goal Note filed on 06/20/19 1112 by Ankit Kruger MS,CCC-SLP    Goal: STG-Within one week, patient will  Outcome: NOT MET  Continue WPTAS                Goal: STG-Within one week, patient will     Dates: Start: 06/14/19       Description: 1) Individualized goal:  Complete executive function tasks given min A to achieve 80% accuracy or greater  2) Interventions:  SLP Speech Language Treatment, SLP Self Care / ADL Training , SLP Cognitive Skill Development and SLP Group Treatment       Note:     Goal Note filed on 06/20/19 1112 by Ankit Kruger MS,CCC-SLP    Goal: STG-Within one week, patient will  Outcome: NOT MET  Min-mod A required for pt to complete executive function tasks with 80%   accuracy                    Problem: Speech/Swallowing LTGs     Dates: Start: 06/12/19       Goal: LTG-By discharge, patient will solve complex problem     Dates: Start: 06/12/19       Description: 1) Individualized goal:  Given spv for a safe discharge home  2) Interventions:  SLP Speech Language Treatment, SLP Self Care / ADL Training , SLP Cognitive Skill Development and SLP Group Treatment                    Section completed by:  Ankit Kruger MS,CCC-SLP

## 2019-06-20 NOTE — CARE PLAN
Problem: OT Long Term Goals  Goal: LTG-By discharge, patient will complete basic home management  1) Individualized Goal:  With  Modified independence  2) Interventions:  OT Group Therapy, OT Self Care/ADL, OT Cognitive Skill Dev, OT Community Reintegration, OT Manual Ther Technique, OT Neuro Re-Ed/Balance, OT Sensory Int Techniques, OT Therapeutic Activity, OT Evaluation and OT Therapeutic Exercise   Outcome: NOT MET  Sup/SBA secondary to vision impairment and safety awareness

## 2019-06-20 NOTE — REHAB-OT IDT TEAM NOTE
Occupational Therapy   Activities of Daily Living  Eating Initial:  5 - Standby Prompting/Supervision or Set-up  Eating Current:  6 - Modified Independent   Eating Description:  Insert dentures  Grooming Initial:  5 - Standby Prompting/Supervision or Set-up  Grooming Current:  5 - Standby Prompting/Supervision or Set-up   Grooming Description:  Supervision for safety (standing at sink for oral care)  Bathing Initial:  5 - Standby Prompting/Supervision or Set-up  Bathing Current:  5 - Standby Prompting/Supervision or Set-up   Bathing Description:  Tub bench, Hand held shower, Increased time, Supervision for safety, Grab bar  Upper Body Dressing Initial:  5 - Standby Prompting/Supervision or Set-up  Upper Body Dressing Current:  6 - Modified Independent   Upper Body Dressing Description:  Increased time  Lower Body Dressing Initial:  4 - Minimal Assistance  Lower Body Dressing Current:  5 - Standby Prompting/Supervsion or Set-up   Lower Body Dressing Description:  5 - Standby Prompting/Supervsion or Set-up  Toileting Initial:  4 - Minimal Assistance  Toileting Current:  5 - Standby Prompting/Supervision or Set-up   Toileting Description:  Grab bar, Supervision for safety  Toilet Transfer Initial:  4 - Minimal Assistance  Toilet Transfer Current:  5 - Standby Prompting/Supervision or Set-up   Toilet Transfer Description:  5 - Standby Prompting/Supervision or Set-up  Tub / Shower Transfer Initial:  4 - Minimal Assistance  Tub / Shower Transfer Current:  5 - Standby Prompting/Supervision or Set-up   Tub / Shower Transfer Description:  Supervision for safety, Increased time  IADL:  Ongoing  Family Training/Education:  TBD   DME/DC Recommendations:  TBD    Strengths:  Independent PLOF, Motivated for self care and independence, Pleasant and cooperative, Supportive family and Willingly participates in therapeutic activities  Barriers:  Impulsive and Other: Diplopia     # of short term goals set= 4    # of short term goals  met= 4          Occupational Therapy Goals           Problem: OT Long Term Goals     Dates: Start: 06/12/19       Goal: LTG-By discharge, patient will complete basic self care tasks     Dates: Start: 06/12/19       Description: 1) Individualized Goal:  With  Modified independence  2) Interventions:  OT Group Therapy, OT Self Care/ADL, OT Cognitive Skill Dev, OT Community Reintegration, OT Manual Ther Technique, OT Neuro Re-Ed/Balance, OT Sensory Int Techniques, OT Therapeutic Activity, OT Evaluation and OT Therapeutic Exercise           Goal: LTG-By discharge, patient will perform bathroom transfers     Dates: Start: 06/12/19       Description: 1) Individualized Goal:  With  Modified independence  2) Interventions:  OT Group Therapy, OT Self Care/ADL, OT Cognitive Skill Dev, OT Community Reintegration, OT Manual Ther Technique, OT Neuro Re-Ed/Balance, OT Sensory Int Techniques, OT Therapeutic Activity, OT Evaluation and OT Therapeutic Exercise           Goal: LTG-By discharge, patient will complete basic home management     Dates: Start: 06/12/19       Description: 1) Individualized Goal:  With  Modified independence  2) Interventions:  OT Group Therapy, OT Self Care/ADL, OT Cognitive Skill Dev, OT Community Reintegration, OT Manual Ther Technique, OT Neuro Re-Ed/Balance, OT Sensory Int Techniques, OT Therapeutic Activity, OT Evaluation and OT Therapeutic Exercise                 Section completed by:  Dorothy Ko, Student

## 2019-06-20 NOTE — PROGRESS NOTES
Problem: Safety  Goal: Will remain free from injury  Per therapy ok for patient to ambulate with SPV.      Problem: Pain Management  Goal: Pain level will decrease to patient's comfort goal  Pt. c/o headache once during this shift. PRN Fioricet given with positive relief.

## 2019-06-20 NOTE — REHAB-ACTIVITY IDT TEAM NOTE
ACT   Recreation/Community     Leisure Competence Measure  Leisure Awareness: Independent  Leisure Attitude: Independent  Leisure Skills: Independent  Cultural / Social Behaviors: Independent  Interpersonal Skills: Independent  Community Integration Skills: Independent  Social Contact: Independent  Community Participation: Independent    Strengths:  Able to follow instructions, Effective communication skills, Good balance, Motivated for self care and independence, Pleasant and cooperative, Supportive family and Willingly participates in therapeutic activities  Barriers:  Poor carryover of learning  # of short term goals set=2  # of short term goals met=1  Will focus on the planning of an outing involving him way finding in a store many different items in different parts of the store. Has been tolerating balance while holding objects up to 5 gallons of water while watering the garden. He was able to community ambulate on uneven surfaces including grass and gravel.          Recreation Therapy Problems           Problem: Recreation Therapy     Dates: Start: 06/14/19       Goal: STG-Within one week, patient will demonstrate leisure problem solving     Dates: Start: 06/14/19       Description: By performing a cognitive leisure activity with 50% accuracy.            Goal: STG-Within one week, patient will actively engage in planning of community re-integration outing     Dates: Start: 06/14/19             Goal: LTG-By discharge, patient will demonstrate leisure problem solving     Dates: Start: 06/14/19       Description: By performing a cognitive leisure activity with 50% accuracy.            Goal: LTG-By discharge, patient will participate in a community re-integration outing     Dates: Start: 06/14/19                   Section completed by:  Jimmy Sanderson, CTRS

## 2019-06-20 NOTE — THERAPY
Physical Therapy   Daily Treatment     Patient Name: Tuesday Rehab  Age:  43 y.o., Sex:  male  Medical Record #: [id number]  Today's Date: 6/19/2019     Precautions  Precautions: Other (See Comments)  Comments: diplopia, eye patch/glasses for vision assist    Subjective    Patient agreeable to PT.     Objective       06/19/19 1031   Precautions   Precautions Other (See Comments)   Comments diplopia, eye patch/glasses for vision assist   Vitals   O2 (LPM) 0   O2 Delivery None (Room Air)   Sitting Lower Body Exercises   Nustep Resistance Level 7  (0.50 miles in 14:40 actively, and 2 rest breaks, 66 Calories)   Bed Mobility    Supine to Sit Modified Independent   Sit to Supine Modified Independent   Sit to Stand Modified Independent   Scooting Modified Independent   Rolling Modified Independent   Neuro-Muscular Treatments   Comments 35 min of standing balance with focus on wide vs narrow FARIBA, eye open vs eyes closed, no UE support as much as possible, and on firm vs Airex vs Dynadisc; then finished with eyes open vs closed with 2 sets of 2 reps ea LE x30 sec ea rep with SLS.  SBA throughout.  One seated break.  Eye patch donned throughout   Interdisciplinary Plan of Care Collaboration   Collaboration Comments Please ambulate with patient indoors/outdoors using SPV-SBA, eye patch, and cueing.   PT Total Time Spent   PT Individual Total Time Spent (Mins) 60   PT Charge Group   PT Gait Training 1   PT Therapeutic Exercise 1   PT Neuromuscular Re-Education / Balance 2     Discussed having staff A with ambulation indoors and outdoors; discussed having staff present to assist with setup/breakdown of NuStep usage outside of therapy hours; discussed POC, treatment goals, strengths, and weaknesses.  Discussed RPE for activity pacing with all activities including NuStep usage and CV endurance.    FIM Bed/Chair/Wheelchair Transfers Score: 6 - Modified Independent  Bed/Chair/Wheelchair Transfers Description:   (HOB elevated; eye  patch donned)    FIM Walking Score:  5 - Standby Prompting/Supervision or Set-up  Walking Description:   (eye patch donned throughout; SPV outdoors today with no fatigue limitations up to 1000 feet; SPV indoors for 2 reps but 1 small LOB quickly self-corrected with stepping strategy while in bathroom; cueing for path finding prn)    FIM Wheelchair Score:  0 - Not tested,unsafe activity  Wheelchair Description:       FIM Toilet Transfer Score:  5 - Standby Prompting/Supervision or Set-up  Toilet Transfer Description:  Supervision for safety (mild LOB self-corrected; eye patch donned)      Assessment    Patient has improving standing balance but is most limited by impaired proprioception, vision, and safety at this time; great motivation and participation; improving CV endurance without any symptoms.    Plan    Continue with high level, dynamic activities, outdoor gait, activities that challenge vision, depth perception, peripheral vision, especially to L side, CV endurance as tolerated and RPE scale

## 2019-06-20 NOTE — CARE PLAN
Problem: Recreation Therapy  Goal: STG-Within one week, patient will demonstrate leisure problem solving  By performing a cognitive leisure activity with 50% accuracy.    Outcome: MET Date Met: 06/20/19    Goal: STG-Within one week, patient will actively engage in planning of community re-integration outing  Outcome: PROGRESSING AS EXPECTED    Goal: LTG-By discharge, patient will demonstrate leisure problem solving  By performing a cognitive leisure activity with 50% accuracy.    Outcome: MET Date Met: 06/20/19    Goal: LTG-By discharge, patient will participate in a community re-integration outing  Outcome: NOT MET

## 2019-06-20 NOTE — CARE PLAN
Problem: Dressing  Goal: STG-Within one week, patient will dress LB  1) Individualized Goal:  With supervision  2) Interventions:  OT Group Therapy, OT Self Care/ADL, OT Cognitive Skill Dev, OT Community Reintegration, OT Manual Ther Technique, OT Neuro Re-Ed/Balance, OT Sensory Int Techniques, OT Therapeutic Activity, OT Evaluation and OT Therapeutic Exercise     Outcome: MET Date Met: 06/20/19      Problem: Toileting  Goal: STG-Within one week, patient will complete toileting tasks  1) Individualized Goal:  With supervision  2) Interventions:  OT Group Therapy, OT Self Care/ADL, OT Cognitive Skill Dev, OT Community Reintegration, OT Manual Ther Technique, OT Neuro Re-Ed/Balance, OT Sensory Int Techniques, OT Therapeutic Activity, OT Evaluation and OT Therapeutic Exercise   Outcome: MET Date Met: 06/20/19      Problem: Functional Transfers  Goal: STG-Within one week, patient will  1) Individualized Goal:  Ambulate to/from bathroom and complete toilet and shower transfers with supervision and use of least restrictive adaptive device  2) Interventions:  OT Group Therapy, OT Self Care/ADL, OT Cognitive Skill Dev, OT Community Reintegration, OT Manual Ther Technique, OT Neuro Re-Ed/Balance, OT Sensory Int Techniques, OT Therapeutic Activity, OT Evaluation and OT Therapeutic Exercise   Outcome: MET Date Met: 06/20/19      Problem: IADL's  Goal: STG-Within one week, patient will prepare a meal  1) Individualized Goal:  With CGA for mobility, verbal cues as needed for organization and sequencing  2) Interventions:  OT Group Therapy, OT Self Care/ADL, OT Cognitive Skill Dev, OT Community Reintegration, OT Manual Ther Technique, OT Neuro Re-Ed/Balance, OT Sensory Int Techniques, OT Therapeutic Activity, OT Evaluation and OT Therapeutic Exercise   Outcome: MET Date Met: 06/20/19  SBA for mobility, VQs as needed

## 2019-06-20 NOTE — CARE PLAN
"Problem: Mobility  Goal: STG-Within one week, patient will ambulate community distances  1) Individualized goal: x200 ft with SPV, no AD.   2) Interventions:  PT Group Therapy, PT E Stim Attended, PT Gait Training, PT Self Care/Home Eval, PT Therapeutic Exercises, PT TENS Application, PT Neuro Re-Ed/Balance, PT Aquatic Therapy, PT Therapeutic Activity, PT Manual Therapy and PT Evaluation     Outcome: MET Date Met: 06/20/19    Goal: STG-Within one week, patient will ambulate up/down flight of stairs  1) Individualized goal: 12 - 6\" steps with SPV w/o use of handrails.   2) Interventions:  PT Group Therapy, PT E Stim Attended, PT Gait Training, PT Self Care/Home Eval, PT Therapeutic Exercises, PT TENS Application, PT Neuro Re-Ed/Balance, PT Aquatic Therapy, PT Therapeutic Activity, PT Manual Therapy and PT Evaluation       Outcome: NOT MET  SPV but uses handrails    Problem: Mobility Transfers  Goal: STG-Within one week, patient will transfer bed to chair  1) Individualized goal: with SPV w/o AD.    2) Interventions:  PT Group Therapy, PT E Stim Attended, PT Gait Training, PT Self Care/Home Eval, PT Therapeutic Exercises, PT TENS Application, PT Neuro Re-Ed/Balance, PT Aquatic Therapy, PT Therapeutic Activity, PT Manual Therapy and PT Evaluation       Outcome: MET Date Met: 06/20/19        "

## 2019-06-20 NOTE — THERAPY
Occupational Therapy  Daily Treatment     Patient Name: Tuesday Rehab  Age:  43 y.o., Sex:  male  Medical Record #: [id number]  Today's Date: 6/20/2019     Precautions  Precautions: (P) Other (See Comments)  Comments: (P) diplopia, eye patch/glasses for vision assist, Mod I ambulation in room only    Safety   ADL Safety : Requires Supervision for Safety  Bathroom Safety: Requires Supervision for Safety    Subjective     Objective       06/20/19 1401   Precautions   Precautions Other (See Comments)   Comments diplopia, eye patch/glasses for vision assist, Mod I ambulation in room only   Vitals   O2 Delivery None (Room Air)   Pain   Intervention Declines   Pain 0 - 10 Group   Pain Rating Scale (NPRS) 0   Non Verbal Descriptors   Non Verbal Scale  Calm   Cognition    Level of Consciousness Alert   Sitting Lower Body Exercises   Nustep Resistance Level 7  (15mins, 0.75miles)   Interdisciplinary Plan of Care Collaboration   Patient Position at End of Therapy Seated;Call Light within Reach;Tray Table within Reach;Phone within Reach     Eye stretches - pt donned safety glasses w/ L nasal field occluded to force L eye to scan laterally. Therapist had pt follow pencil in L lateral, L upper, and L lower quadrant and hold for 6secs. 3xs each quadrant for total of 9 stretches. Pt indicated slight headache and fatigue of eyes at end of activity.     Balloon tap - Pt ambulated via CGA down hallways while tapping balloon to self for 560ft. Pt required to scan to keep balloon in visual field, while scanning hallway while ambulating. No LOB, no RB's. Pt tapped balloon to self and therapist tossed balloon to pt to be tapped back to therapist every 5 hits. Pt completed this task for 240ft. Noted increased difficulty w/ 2 balloons to keep balloon up in air. No LOB noted.     FIM Grooming Score:  6 - Modified Independent  Grooming Description:  Increased time (Standing at sinkside)    FIM Bathing Score:  6 - Modified Independent  Bathing  Description:       FIM Upper Body Dressin - Modified Independent  Upper Body Dressing Description:  Increased time    FIM Lower Body Dressing Score:  6 - Modified Independent  Lower Body Dressing Description:  Increased time (Stabilized w/ counter to doff socks. Seated to thread legs through pants/breifs and don socks )    FIM Toiletin - Modified Independent  Toileting Description:       FIM Toilet Transfer Score:  6 - Modified Independent  Toilet Transfer Description:       FIM Tub/Shower Transfers Score:  6 - Modified Independent  Tub/Shower Transfers Description:         Assessment    Pt alert and cooperative w/ tx session. Pt demonstrated increased independence with ADLs (refer to FIM scores). Pt able to tap balloon to self functionally, however required increased time and decreased accuracy secondary to diplopia.     Plan    Pt to DC tomorrow.

## 2019-06-20 NOTE — THERAPY
Speech Language Pathology  Daily Treatment     Patient Name: Tuesday Rehab  Age:  43 y.o., Sex:  male  Medical Record #: [id number]  Today's Date: 6/20/2019     Subjective    Pt was pleasant and cooperative during this ST session      Objective       06/20/19 0931   WPTAS ([place] Post-traumatic Amnesia Scale)   How old are you? 1   What is your date of birth? 1   What month are we in? 1   What time of day is it? (morning, noon, or night) 1   What day of the week is it? 1   What year are we in? 1   What is the name of this place 1   Do you remember me? 0   What is my name? 1   Target Picture 1 1   Target Picture 2 1   Target Picture 3 1   Orientation Score 7   Recall Score 4   Total Score 11   Still in post-traumatic amnesia? Yes   SLP Total Time Spent   SLP Individual Total Time Spent (Mins) 60   Charge Group   SLP Cognitive Skill Development 4       Assessment    WPTAS completed, pt scored an 11/12, picture set to remain set 2 for tomorrow.  Pt required mod cues to locate ST from his room.  Pt continued complex problem solving and organization task from yesterdays session and required mod cues for organization and problem solving to achieve 100% accuracy.  Pt also completed 2 deductive reasoning puzzles needing mod A on the first one and min A on the second one for problem solving to achieve 100% accuracy.  Pt voiced his desire to discharge home soon.      Plan    Continue targeting WPTAS, memory and problem solving.

## 2019-06-20 NOTE — REHAB-NURSING IDT TEAM NOTE
Nursing   Nursing  Diet/Nutrition:  Regular and Thin Liquids  Medication Administration:  Whole with Liquid Wash  % consumed at meals in last 24 hours:  Consumed % of meals per documentation.  Meal/Snack Consumption for the past 24 hrs:   Oral Nutrition   06/19/19 1915 Dinner;Between 25-50% Consumed   06/19/19 1153 Between 25-50% Consumed   06/19/19 0800 Breakfast;Between % Consumed       Snack schedule:  None  Appetite:  Good  Fluid Intake/Output in past 24 hours: In: 840 [P.O.:840]  Out: -   Admit Weight:  Weight: 73 kg (161 lb)  Weight Last 7 Days   [67.8 kg (149 lb 6.4 oz)] 67.8 kg (149 lb 6.4 oz) (06/16 1300)    Pain Issues:    Location:  Head (06/19 0729)  Lateral (06/19 0729)         Severity:  neither Mild nor Moderate   Scheduled pain medications:  None     PRN pain medications used in last 24 hours:  None  Fioricet  Non Pharmacologic Interventions:  rest  Effectiveness of pain management plan:  good=patient states acceptable comfort after interventions    Bowel:    Bowel Assist Initial Score:  5 - Standby Prompting/Supervision or Set-up  Bowel Assist Current Score:  6 - Modified Independent  Bowl Accidents in last 7 days:     Last bowel movement: 06/19/19 (per pt )  Stool Description: Large     Usual bowel pattern:  daily  Scheduled bowel medications:  None  PRN bowel medications used in last 24 hours:  None  Nursing Interventions:  Increased time, Supervision  Effectiveness of bowel program:   good=regular, predictable, controlled emptying of bowel  Bladder:    Bladder Assist Initial Score:  4 - Minimal Assistance  Bladder Assist Current Score:  6 - Modified Independent  Bladder Accidents in last 7 days:     PVR range for past 24-48 hours: -- ()  Intermittent Catheterization:    Medications affecting bladder:  None    Time void schedule/voiding pattern:  Voiding every 2-4 hours  Interventions:  Increased time, Supervision  Effectiveness of bladder training:  Good=regular, predictable, emptying  of bladder, patient initiates time voiding    Wound:         Patient Lines/Drains/Airways Status    Active Current Wounds     Name: Placement date: Placement time: Site: Days:    Wound 06/04/19 Head 06/04/19   1037      15                   Interventions:  PAKO  Effectiveness of intervention:  wound is improving     Sleep/wake cycle:   Average hours slept:  Sleeps 4-6 hours without waking  Sleep medication usage:  None    Patient/Family Training/Education:  General Self Care, Pain Management, Safety and Skin Care  Strengths: Alert and oriented, Able to follow instructions and Pleasant and cooperative   Barriers:   Generalized weakness            Nursing Problems           Problem: Bowel/Gastric:     Goal: Normal bowel function is maintained or improved           Goal: Will not experience complications related to bowel motility             Problem: Communication     Goal: The ability to communicate needs accurately and effectively will improve             Problem: Discharge Barriers/Planning     Goal: Patient's continuum of care needs will be met             Problem: Infection     Goal: Will remain free from infection             Problem: Knowledge Deficit     Goal: Knowledge of disease process/condition, treatment plan, diagnostic tests, and medications will improve           Goal: Knowledge of the prescribed therapeutic regimen will improve             Problem: Pain Management     Goal: Pain level will decrease to patient's comfort goal     Flowsheet:     Taken at 06/14/19 2306    Pain Rating Scale (NPRS) 3 - Sometimes distracts me by Sedrick Galvan R.N.    Non Verbal Scale  Calm by Sedrick Galvan R.N.    Comfort Goal Comfort at Rest;Sleep Comfortably by SALVADOR [email or web address].    Taken at 06/13/19 0010    Pain Rating Scale (NPRS) 0 - No Pain by Sedrick Galvan R.N.    Non Verbal Scale  Calm by Sedrick Galvan R.N.    Comfort Goal Comfort at Rest;Sleep Comfortably by SALVADOR Panchal.N.                   Problem: Safety     Goal: Will remain free from injury           Goal: Will remain free from falls             Problem: Venous Thromboembolism (VTW)/Deep Vein Thrombosis (DVT) Prevention:     Goal: Patient will participate in Venous Thrombosis (VTE)/Deep Vein Thrombosis (DVT)Prevention Measures                  Long Term Goals:   At discharge patient will be able to function safely at home and in the community with support.    Section completed by:  Tahira March R.N.      Reviewed by Lesly Bustos R.N.

## 2019-06-21 VITALS
SYSTOLIC BLOOD PRESSURE: 119 MMHG | RESPIRATION RATE: 18 BRPM | WEIGHT: 149.4 LBS | BODY MASS INDEX: 23.45 KG/M2 | TEMPERATURE: 98.1 F | OXYGEN SATURATION: 90 % | HEART RATE: 73 BPM | DIASTOLIC BLOOD PRESSURE: 77 MMHG | HEIGHT: 67 IN

## 2019-06-21 PROCEDURE — A9270 NON-COVERED ITEM OR SERVICE: HCPCS | Performed by: PHYSICAL MEDICINE & REHABILITATION

## 2019-06-21 PROCEDURE — 99239 HOSP IP/OBS DSCHRG MGMT >30: CPT | Performed by: PHYSICAL MEDICINE & REHABILITATION

## 2019-06-21 PROCEDURE — 700102 HCHG RX REV CODE 250 W/ 637 OVERRIDE(OP): Performed by: PHYSICAL MEDICINE & REHABILITATION

## 2019-06-21 RX ADMIN — VITAMIN D, TAB 1000IU (100/BT) 2000 UNITS: 25 TAB at 09:15

## 2019-06-21 RX ADMIN — ASPIRIN 81 MG 81 MG: 81 TABLET ORAL at 09:15

## 2019-06-21 RX ADMIN — BUTALBITAL, ACETAMINOPHEN AND CAFFEINE 2 TABLET: 50; 325; 40 TABLET ORAL at 09:15

## 2019-06-21 NOTE — DISCHARGE PLANNING
Tc to [facility] and [facility] which are preferred providers w/ workman's comp.   Niether providers have shower chairs.       I provided information on [employer] for shower chair.

## 2019-06-21 NOTE — CARE PLAN
Problem: Communication  Goal: The ability to communicate needs accurately and effectively will improve  Outcome: PROGRESSING AS EXPECTED  Patient is able to make needs known to staff. States he is having regular bowel movements denies dysuria or issues with urination. Denies pain. Will continue to monitor.    Problem: Safety  Goal: Will remain free from injury  Outcome: PROGRESSING AS EXPECTED  Patient is alert and oriented times 4. Mod I in room no assistive device. Will continue to monitor.

## 2019-06-21 NOTE — PROGRESS NOTES
Patient discharged to home per order.  Discharge instructions reviewed with patient and mother; they verbalize understanding and signed copies placed in chart.  Patient has all belongings; signed copy of form in chart.  Patient left facility at 1040 accompanied by rehab staff and family.  Have enjoyed working with this pleasant patient.

## 2019-06-21 NOTE — CARE PLAN
"Problem: Mobility  Goal: STG-Within one week, patient will ambulate up/down flight of stairs  1) Individualized goal: 12 - 6\" steps with SPV w/o use of handrails.   2) Interventions:  PT Group Therapy, PT E Stim Attended, PT Gait Training, PT Self Care/Home Eval, PT Therapeutic Exercises, PT TENS Application, PT Neuro Re-Ed/Balance, PT Aquatic Therapy, PT Therapeutic Activity, PT Manual Therapy and PT Evaluation       Outcome: MET Date Met: 06/21/19    Goal: STG-Within one week, patient will ambulate community distances  1) Individualized goal: x500 ft indoors and outdoors over uneven surfaces, MOD INDEP w/o AD for mobility in community.   2) Interventions: PT Group Therapy, PT E Stim Attended, PT Gait Training, PT Self Care/Home Eval, PT Therapeutic Exercises, PT TENS Application, PT Neuro Re-Ed/Balance, PT Aquatic Therapy, PT Therapeutic Activity, PT Manual Therapy and PT Evaluation        Outcome: MET Date Met: 06/21/19      Problem: Mobility Transfers  Goal: STG-Within one week, patient will transfer bed to chair  1) Individualized goal: MOD INDEP bed<>chair, no AD.  2) Interventions: PT Group Therapy, PT E Stim Attended, PT Gait Training, PT Self Care/Home Eval, PT Therapeutic Exercises, PT TENS Application, PT Neuro Re-Ed/Balance, PT Aquatic Therapy, PT Therapeutic Activity, PT Manual Therapy and PT Evaluation        Outcome: MET Date Met: 06/21/19    Goal: STG-Within one week, patient will transfer in/out of car  1) Individualized goal: MOD INDEP bed<>chair, no AD.  2) Interventions: PT Group Therapy, PT E Stim Attended, PT Gait Training, PT Self Care/Home Eval, PT Therapeutic Exercises, PT TENS Application, PT Neuro Re-Ed/Balance, PT Aquatic Therapy, PT Therapeutic Activity, PT Manual Therapy and PT Evaluation        Outcome: MET Date Met: 06/21/19      Problem: PT-Long Term Goals  Goal: LTG-By discharge, patient will ambulate  1) Individualized goal: x500 ft indoors and outdoors over uneven surfaces, MOD " "INDEP w/o AD for mobility in community.   2) Interventions:  PT Group Therapy, PT E Stim Attended, PT Gait Training, PT Self Care/Home Eval, PT Therapeutic Exercises, PT TENS Application, PT Neuro Re-Ed/Balance, PT Aquatic Therapy, PT Therapeutic Activity, PT Manual Therapy and PT Evaluation       Outcome: MET Date Met: 06/21/19    Goal: LTG-By discharge, patient will transfer one surface to another  1) Individualized goal: MOD INDEP bed<>chair, no AD.  2) Interventions:  PT Group Therapy, PT E Stim Attended, PT Gait Training, PT Self Care/Home Eval, PT Therapeutic Exercises, PT TENS Application, PT Neuro Re-Ed/Balance, PT Aquatic Therapy, PT Therapeutic Activity, PT Manual Therapy and PT Evaluation       Outcome: MET Date Met: 06/21/19    Goal: LTG-By discharge, patient will ambulate up/down flight of stairs  1) Individualized goal: 12- 6\" steps MOD INDEP w/o use of handrails for increased community mobility.  2) Interventions:  PT Group Therapy, PT E Stim Attended, PT Gait Training, PT Self Care/Home Eval, PT Therapeutic Exercises, PT TENS Application, PT Neuro Re-Ed/Balance, PT Aquatic Therapy, PT Therapeutic Activity, PT Manual Therapy and PT Evaluation       Outcome: DISCHARGED-GOAL NOT MET Date Met: 06/21/19        "

## 2019-06-21 NOTE — DISCHARGE PLANNING
Tc to workman's comp Sissy Frias @ [phone] requesting info on status of claim and name of providers for PT/OT/SLP.  Not available.  Left message.

## 2019-06-21 NOTE — DISCHARGE INSTRUCTIONS
Occupational Therapy Discharge Instructions for [facility]    6/21/2019    Level of Assist Required for Eating: Able to Complete Eating without Assist  Level of Assist Required for Grooming: Able to Complete Grooming without Assist  Level of Assist Required for Dressing: Able to Complete Dressing without Assist  Level of Assist Required for Toileting: Able to Complete Toileting without Assist  Level of Assist Required for Toilet Transfer: Able to Complete Toilet Transfer without Assist  Level of Assist Required for Bathing: Able to Complete Bathing without Assist  Equipment for Bathing: Shower Chair  Level of Assist Required for Shower Transfer: Able to Complete Shower Transfer without Assist  Level of Assist Required for Home Mgmt: Requires Supervision with Home Management  Level of Assist Required for Meal Prep: Requires Supervision with Meal Preparation  Driving: Please Contact Physician Prior to Driving  Home Exercise Program: None Issued    It's been a pleasure working with you throughout your physical rehabilitation.  Please continue to be observant of your environment, scanning and practice safety awareness.  Limiters are impaired vision, memory.  Recommended Supervision/Stand By Assist for IADL's/tasks such as cooking/meal prep, laundry and home management.  No driving at this time - please consult with physician in reference to driving.  I wish you the best in your continued physical rehabilitation.    Sincerely,    Angel Chandra OTR/L        Physical Therapy Discharge Instructions for [facility]    6/21/2019    Level of Assist Required for Ambulation: Supervision on Flat Surfaces, No Assist on Curbs, No Assist on Stairs  Distance Patient May Ambulate: community distances  Device Recommended for Ambulation:  (eye patch)  Level of Assist Required for Transfers: Requires No Assist  Device Recommended for Transfers:  (eye patch)  Home Exercise Program: None Issued  Prosthesis / Orthosis Recommendation /  Location: No Prosthesis  or Orthosis Recommended            [facility] NURSING DISCHARGE INSTRUCTIONS    Blood Pressure: 135/75  Weight: 67.8 kg (149 lb 6.4 oz)  Nursing recommendations for [facility] at time of discharge are as follows:  Client verbalized understanding of all discharge instructions and prescriptions.     Review all your home medications and newly ordered medications with your doctor and/or pharmacist. Follow medication instructions as directed by your doctor and/or pharmacist.    Pain Management:   Discharge Pain Medication Instructions:  Comfort Goal: Comfort at Rest, Comfort with Movement, Sleep Comfortably  Notify your primary care provider if pain is unrelieved with these measures, if the pain is new, or increased in intensity.    Discharge Skin Characteristics: Warm, Dry  Discharge Skin Exam: Other (Comments) (scabbing on forehead)  Wound 06/04/19 Head (Active)   Site Assessment Clean;Dry;Black 6/20/2019 11:26 PM   Carole-wound Assessment Clean;Dry;Intact 6/20/2019 11:26 PM   Margins Undefined edges 6/20/2019 11:26 PM   Closure Open to air 6/20/2019 11:26 PM   Drainage Amount None 6/20/2019 11:26 PM   Treatments Site care 6/16/2019  7:10 AM   Cleansing Approved Wound Cleanser 6/19/2019  8:01 AM   Periwound Protectant Not Applicable 6/20/2019 11:26 PM   Dressing Options Open to Air 6/20/2019 11:26 PM     Skin / Wound Care Instructions: Please contact your primary care physician for any change in skin integrity.    If You Have Surgical Incisions / Wounds:  Monitor surgical site(s) for signs of increased swelling, redness or symptoms of drainage from the site or fever as this could indicate signs and symptoms of infection. If these symptoms are noted, notifiy your primary care provider.      Discharge Safety Instructions: Should Not Be Left Alone In The House     Discharge Safety Concerns: Impaired Judgement, Other (Comments) (vision impairment)  The interdisciplinary team  "has made recommendation that you should not be left alone  in the house due to impaired judgment and vision impairment.  Anti-embolic stockings are required during the day and off at night to increase circulation to the lower extremities.    Discharge Diet: Regular      Discharge Liquids: Thin  Discharge Bowel Function: Continent  Please contact your primary care physician for any changes in bowel habits.  Discharge Bowel Program:    Discharge Bladder Function: Continent  Discharge Urinary Devices: None      Nursing Discharge Plan:   Influenza Vaccine Indication: Indicated: 9 to 64 years of age    Case Management Discharge Instructions:   Discharge Location:    Agency Name/Address/Phone:    Home Health:    Outpatient Services:    DME Provider/Phone:    Medical Equipment Ordered:    Prescription Faxed to:        Discharge Medication Instructions:  Below are the medications your physician expects you to take upon discharge:    Visual Disturbances  You have had a disturbance in your vision. This may be caused by various conditions, such as:  · Migraines. Migraine headaches are often preceded by a disturbance in vision. Blind spots or light flashes are followed by a headache. This type of visual disturbance is temporary. It does not damage the eye.  · Glaucoma. This is caused by increased pressure in the eye. Symptoms include haziness, blurred vision, or seeing rainbow colored circles when looking at bright lights. Partial or complete visual loss can occur. You may or may not experience eye pain. Visual loss may be gradual or sudden and is irreversible. Glaucoma is the leading cause of blindness.  · Retina problems. Vision will be reduced if the retina becomes detached or if there is a circulation problem as with diabetes, high blood pressure, or a mini-stroke. Symptoms include seeing \"floaters,\" flashes of light, or shadows, as if a curtain has fallen over your eye.  · Optic nerve problems. The main nerve in your eye " can be damaged by redness, soreness, and swelling (inflammation), poor circulation, drugs, and toxins.  It is very important to have a complete exam done by a specialist to determine the exact cause of your eye problem. The specialist may recommend medicines or surgery, depending on the cause of the problem. This can help prevent further loss of vision or reduce the risk of having a stroke. Contact the caregiver to whom you have been referred and arrange for follow-up care right away.  SEEK IMMEDIATE MEDICAL CARE IF:   · Your vision gets worse.  · You develop severe headaches.  · You have any weakness or numbness in the face, arms, or legs.  · You have any trouble speaking or walking.  This information is not intended to replace advice given to you by your health care provider. Make sure you discuss any questions you have with your health care provider.  Document Released: 01/25/2006 Document Revised: 03/11/2013 Document Reviewed: 05/27/2015  [employer] Interactive Patient Education © 2017 [employer]    Facial Fracture  A facial fracture is a break in one of the bones of your face.  HOME CARE INSTRUCTIONS   · Protect the injured part of your face until it is healed.  · Do not participate in activities which give chance for re-injury until your doctor approves.  · Gently wash and dry your face.  · Wear head and facial protection while riding a bicycle, motorcycle, or snowmobile.  SEEK MEDICAL CARE IF:   · An oral temperature above 102° F (38.9° C) develops.  · You have severe headaches or notice changes in your vision.  · You have new numbness or tingling in your face.  · You develop nausea (feeling sick to your stomach), vomiting or a stiff neck.  SEEK IMMEDIATE MEDICAL CARE IF:   · You develop difficulty seeing or experience double vision.  · You become dizzy, lightheaded, or faint.  · You develop trouble speaking, breathing, or swallowing.  · You have a watery discharge from your nose or ear.  MAKE SURE YOU:    · Understand these instructions.  · Will watch your condition.  · Will get help right away if you are not doing well or get worse.  Document Released: 12/18/2006 Document Revised: 03/11/2013 Document Reviewed: 08/06/2009  [employer]® Patient Information ©2014 [employer].    Fall Prevention in the Home  Introduction  Falls can cause injuries. They can happen to people of all ages. There are many things you can do to make your home safe and to help prevent falls.  What can I do on the outside of my home?  · Regularly fix the edges of walkways and driveways and fix any cracks.  · Remove anything that might make you trip as you walk through a door, such as a raised step or threshold.  · Trim any bushes or trees on the path to your home.  · Use bright outdoor lighting.  · Clear any walking paths of anything that might make someone trip, such as rocks or tools.  · Regularly check to see if handrails are loose or broken. Make sure that both sides of any steps have handrails.  · Any raised decks and porches should have guardrails on the edges.  · Have any leaves, snow, or ice cleared regularly.  · Use sand or salt on walking paths during winter.  · Clean up any spills in your garage right away. This includes oil or grease spills.  What can I do in the bathroom?  · Use night lights.  · Install grab bars by the toilet and in the tub and shower. Do not use towel bars as grab bars.  · Use non-skid mats or decals in the tub or shower.  · If you need to sit down in the shower, use a plastic, non-slip stool.  · Keep the floor dry. Clean up any water that spills on the floor as soon as it happens.  · Remove soap buildup in the tub or shower regularly.  · Attach bath mats securely with double-sided non-slip rug tape.  · Do not have throw rugs and other things on the floor that can make you trip.  What can I do in the bedroom?  · Use night lights.  · Make sure that you have a light by your bed that is easy to reach.  · Do not use  any sheets or blankets that are too big for your bed. They should not hang down onto the floor.  · Have a firm chair that has side arms. You can use this for support while you get dressed.  · Do not have throw rugs and other things on the floor that can make you trip.  What can I do in the kitchen?  · Clean up any spills right away.  · Avoid walking on wet floors.  · Keep items that you use a lot in easy-to-reach places.  · If you need to reach something above you, use a strong step stool that has a grab bar.  · Keep electrical cords out of the way.  · Do not use floor polish or wax that makes floors slippery. If you must use wax, use non-skid floor wax.  · Do not have throw rugs and other things on the floor that can make you trip.  What can I do with my stairs?  · Do not leave any items on the stairs.  · Make sure that there are handrails on both sides of the stairs and use them. Fix handrails that are broken or loose. Make sure that handrails are as long as the stairways.  · Check any carpeting to make sure that it is firmly attached to the stairs. Fix any carpet that is loose or worn.  · Avoid having throw rugs at the top or bottom of the stairs. If you do have throw rugs, attach them to the floor with carpet tape.  · Make sure that you have a light switch at the top of the stairs and the bottom of the stairs. If you do not have them, ask someone to add them for you.  What else can I do to help prevent falls?  · Wear shoes that:  ¨ Do not have high heels.  ¨ Have rubber bottoms.  ¨ Are comfortable and fit you well.  ¨ Are closed at the toe. Do not wear sandals.  · If you use a stepladder:  ¨ Make sure that it is fully opened. Do not climb a closed stepladder.  ¨ Make sure that both sides of the stepladder are locked into place.  ¨ Ask someone to hold it for you, if possible.  · Clearly isabel and make sure that you can see:  ¨ Any grab bars or handrails.  ¨ First and last steps.  ¨ Where the edge of each step  is.  · Use tools that help you move around (mobility aids) if they are needed. These include:  ¨ Canes.  ¨ Walkers.  ¨ Scooters.  ¨ Crutches.  · Turn on the lights when you go into a dark area. Replace any light bulbs as soon as they burn out.  · Set up your furniture so you have a clear path. Avoid moving your furniture around.  · If any of your floors are uneven, fix them.  · If there are any pets around you, be aware of where they are.  · Review your medicines with your doctor. Some medicines can make you feel dizzy. This can increase your chance of falling.  Ask your doctor what other things that you can do to help prevent falls.  This information is not intended to replace advice given to you by your health care provider. Make sure you discuss any questions you have with your health care provider.  Document Released: 10/14/2010 Document Revised: 05/25/2017 Document Reviewed: 01/22/2016  © 2017 [employer]    Helping Someone Who is Suicidal  Suicide is when someone takes his or her own life. Someone who is thinking about suicide needs immediate help. Although you might not know what to say or do to help, start by letting that person know you care. Listen to him or her. Then talk about how to get help. Help is available through therapy, medicine, and other treatments.  What are signs that someone is suicidal?  Common signs include:  Signs of depression, such as:  Rage.  Irritability.  Shame.  Excessive worry.  Loss of interest in things the person once enjoyed.  Changes in social behaviors and relationships, including:  Isolating oneself.  Withdrawing from friends and family.  Giving away possessions.  Saying good-bye.  Acting aggressively.  Sleeping more or less than usual.  Having trouble managing school or work.  Talking about feeling hopeless or being a burden.  Engaging in risky behaviors, such as drinking more alcohol or using more drugs.  What are the risk factors for suicide?  Risk factors for suicide  include:  Other suicides in the family.  A history of suicide attempts.  Depression or other mental health issues.  Being in half-way or facing half-way time.  Having had close friends who have committed suicide.  Alcohol or drug abuse, especially combined with a mental illness.  What should I do if someone is suicidal?  If you believe a person is in immediate danger of committing suicide, call your local emergency services (911 in the [place] for help.  If a person says he or she wants to commit suicide, take the threat seriously. Help the person get help right away by:  Calling your local emergency services.  Calling a suicide prevention hotline.  Contacting a crisis center or a local suicide prevention center. These are often located at hospitals, clinics, community service organizations, social service providers, or health departments.  If a person confides in you that he or she is considering suicide:  Listen to the person's thoughts and concerns with compassion.  Let the person know you will stay with him or her.  Ask if the person is having thoughts of hurting himself or herself.  Offer to help the person get to a doctor or mental health professional.  Remove all weapons and medicines from the person's living space.  Do not promise to keep his or her thoughts of suicide a secret.  This information is not intended to replace advice given to you by your health care provider. Make sure you discuss any questions you have with your health care provider.  Document Released: 06/23/2004 Document Revised: 05/25/2017 Document Reviewed: 05/28/2015  [employer] Interactive Patient Education © 2017 [employer]
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
## 2019-06-21 NOTE — DISCHARGE PLANNING
Case management Summary:   Met with pt and his mother prior to discharge.   They are aware WC claim is still PENDING.    Pt has card for workmans comp , and advised him to call her for assistance with making follow up appts and out pt therapy appointments/        I reviewed all md's he will need to have  follow up appointments with.    I am unable to make any appts., as pt pending wc.       A shower chair was recommended also,  I provided pt with a [employer] application informing him he can obtain one there at no cost.     Medicaid application provided and information on [facility] for PCP.      Patient has not been cleared to return to driving.        During hospitalization, CM has provided support and education and have been available for questions and information during hours of operation, communicated with therapy team and MD along with providing links/resources  to outside services.      Patient/ mother  verbalizes agreement with all plans and has an understanding of the next steps within the post acute services.     Individualized Goals:   1. To get my energy back  2. To get back to work  3. Provide post acute resources as indicated working w/ WC .    Outcome:   1. Met.  Energy levels have improved.  2. Not Met.  Patient not cleared to return to work.   3. Met.  Post acute resources provided to pt; WC  will assist w/ making appointments referrals as I have faxed orders for out pt therapy/shower chair to her. Provided info on applying for Medicaid/ [facility].

## 2019-06-21 NOTE — DISCHARGE PLANNING
Tc back from workman's comp./ Sissy Frias  She confirms wc claim is no approved yet.   No appts can be made w/ providers until claim is approved.   Dr. Britt and Dr. Pizano are not on wc panel.  She has requested authorization from  for follow up.  Dr. Bernal is a provider.   I have faxed the referral for out pt therapies to Sissy Dyer as she will need to arrange out pt therapy if claim is approved.   A shower chair has been ordered through [employer]     I have provided pt w/ Medicaid Application and information on [facility] for PCP.

## 2019-06-21 NOTE — DISCHARGE PLANNING
Late entry from 6/20/ CM    I met w/ pt providing udpate from IDT and recommendations for out pt therapy for PT/OT/SLP and a shower chair along with neuro opthalmology follow up appt.  Pt is in agreement w/ dc.  I informed him workman's comp is trying to confirm status of eligibility for his claim.  In order for him to obtain out pt servcies, and follow up, he will need to be approved through workmans' comp.  Information provided on [facility] and [facility] for Medical Follow up along with applying for Medicaid.  He would like to be dc before lunch if possible.  Pharmacy is [facility] in [place].

## 2019-06-21 NOTE — DISCHARGE PLANNING
Case Management Discharge Instructions for Govind Corral  Discharge date:  Friday 6/21/2019.        · Discharge Location: Home with family.     · Outpatient Services for  Physical Therapy, Occupational Therapy, Speech Therapy     · Recommended Durable Medical Equipment is a shower chair.  [facility]  is an option to obtain a shower chair.        Your claim with workman's comp is still pending.   If approved, Sissy Mercadomarkus Frias  will assist with making follow up appointments with below mentioned providers and making referrals for out patient physical therapy, occupational therapy, and speech therapy.     CONTACT WITH Fanne[employer] IS: Sissy Frias .     Follow-up Information:  · Navarro Bernal M.D. Neurosurgeon  [address]  [address]  [place] [address]  [phone]     · Katherine Pizano M.D. Ear, Nose, & Throat  [address]  [place] [address]  [phone]      · Erik Britt M.D. Neuropthalmologist  [address]  [address]  [place] [id number] [phone]       · [facility]   [address] [facility] [id number] [phone]   Please call to make an appoiontment to get established with a Primary Care MD.       A [place] Medicaid Application which has been provided and needs to be completed if you are not approved through [employer].  A [employer] application has been provded to obtain a shower chair at no cost.

## 2019-06-21 NOTE — DISCHARGE SUMMARY
Dc summary faxed to lalo fernandez rn case manager w/ WC along w/ script for out pt therapy and shower chair.

## 2019-07-02 ENCOUNTER — TELEPHONE (OUTPATIENT)
Dept: OPHTHALMOLOGY | Facility: MEDICAL CENTER | Age: 44
End: 2019-07-02

## 2019-07-11 ENCOUNTER — APPOINTMENT (OUTPATIENT)
Dept: OPHTHALMOLOGY | Facility: MEDICAL CENTER | Age: 44
End: 2019-07-11

## 2019-07-22 ENCOUNTER — OFFICE VISIT (OUTPATIENT)
Dept: OPHTHALMOLOGY | Facility: MEDICAL CENTER | Age: 44
End: 2019-07-22
Payer: COMMERCIAL

## 2019-07-22 DIAGNOSIS — H49.21 6TH NERVE PALSY, RIGHT: ICD-10-CM

## 2019-07-22 DIAGNOSIS — S04.039A: ICD-10-CM

## 2019-07-22 DIAGNOSIS — H46.9 OPTIC NEUROPATHY, LEFT: ICD-10-CM

## 2019-07-22 PROCEDURE — 92060 SENSORIMOTOR EXAMINATION: CPT | Performed by: OPHTHALMOLOGY

## 2019-07-22 PROCEDURE — 99204 OFFICE O/P NEW MOD 45 MIN: CPT | Mod: 25 | Performed by: OPHTHALMOLOGY

## 2019-07-22 PROCEDURE — 92133 CPTRZD OPH DX IMG PST SGM ON: CPT | Performed by: OPHTHALMOLOGY

## 2019-07-22 RX ORDER — PHENYLEPHRINE HYDROCHLORIDE 25 MG/ML
1 SOLUTION/ DROPS OPHTHALMIC
Status: COMPLETED | OUTPATIENT
Start: 2019-07-22 | End: 2019-07-22

## 2019-07-22 RX ORDER — TROPICAMIDE 10 MG/ML
1 SOLUTION/ DROPS OPHTHALMIC ONCE
Status: COMPLETED | OUTPATIENT
Start: 2019-07-22 | End: 2019-07-22

## 2019-07-22 RX ADMIN — TROPICAMIDE 1 DROP: 10 SOLUTION/ DROPS OPHTHALMIC at 09:19

## 2019-07-22 RX ADMIN — PHENYLEPHRINE HYDROCHLORIDE 1 DROP: 25 SOLUTION/ DROPS OPHTHALMIC at 09:19

## 2019-07-22 ASSESSMENT — EXTERNAL EXAM - LEFT EYE: OS_EXAM: NORMAL

## 2019-07-22 ASSESSMENT — SLIT LAMP EXAM - LIDS
COMMENTS: NORMAL
COMMENTS: NORMAL

## 2019-07-22 ASSESSMENT — VISUAL ACUITY
OD_SC: 20/20
OS_SC: 20/20
METHOD: SNELLEN - LINEAR

## 2019-07-22 ASSESSMENT — CONF VISUAL FIELD
OD_NORMAL: 1
OS_NORMAL: 1

## 2019-07-22 ASSESSMENT — EXTERNAL EXAM - RIGHT EYE: OD_EXAM: NORMAL

## 2019-07-22 ASSESSMENT — TONOMETRY
OS_IOP_MMHG: 12
OD_IOP_MMHG: 12

## 2019-07-22 ASSESSMENT — ENCOUNTER SYMPTOMS
DOUBLE VISION: 1
BLURRED VISION: 1

## 2019-07-22 ASSESSMENT — CUP TO DISC RATIO
OD_RATIO: 0.4
OS_RATIO: 0.4

## 2019-07-22 NOTE — PROCEDURES
Normal NFL thickness OU at 92 OD and 92 OS. Ave NFL thickness 70 OD and 60 OS, but some temporal decrease in NFL thickness OD and nasal OS

## 2019-07-22 NOTE — ASSESSMENT & PLAN NOTE
7/22/2019 - Partial left 6th nerve palsy vs restrictive orbitopathy secondary to the extensive orbital fractures on the left. I suspect more the latter given that the base of skull fracture extended to the left side of the clivus, but there is air around the cavernous sinus so could have also had injury to 6th in that area of well. There is significant intraorbital air and a nasal, small floor and lateral wall fracture, but none significantly displaced. Forced duction suggested more of a restrictive process. Thus discussed that since early would like to continue to monitor for any recovery. Did place an in office PAT and tolerated a 30 base out prism over the left lens and could begin to fuse. Thus discussed getting a +0.50 lesn ou and possibly return to place the press on if the optical shop cannot do. Would like to re-eval in 6 weeks. Discussed that might eventually require strabismus repair.
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
## 2019-07-22 NOTE — ASSESSMENT & PLAN NOTE
7/22/2019 - Although vision and color vision good, Ganglion cell thickness was decrease temporally in the right eye, and nasally on the left eye suggesting possible subclinical involvement of the right visual pathways or optic tract. Will monitor.

## 2019-09-04 ENCOUNTER — OFFICE VISIT (OUTPATIENT)
Dept: OPHTHALMOLOGY | Facility: MEDICAL CENTER | Age: 44
End: 2019-09-04
Payer: COMMERCIAL

## 2019-09-04 DIAGNOSIS — H49.21 6TH NERVE PALSY, RIGHT: ICD-10-CM

## 2019-09-04 PROCEDURE — 92014 COMPRE OPH EXAM EST PT 1/>: CPT | Performed by: OPHTHALMOLOGY

## 2019-09-04 PROCEDURE — 92060 SENSORIMOTOR EXAMINATION: CPT | Mod: 59 | Performed by: OPHTHALMOLOGY

## 2019-09-04 ASSESSMENT — REFRACTION_WEARINGRX
OD_SPHERE: -0.50
OS_HPRISM: 3.0
OS_HBASE: OUT
OS_SPHERE: +0.50
SPECS_TYPE: SVL
OD_CYLINDER: SPHERE
OS_CYLINDER: SPHERE

## 2019-09-04 ASSESSMENT — REFRACTION_MANIFEST
METHOD_AUTOREFRACTION: 1
OS_CYLINDER: +0.25
OD_SPHERE: +0.50
OS_AXIS: 096
OD_CYLINDER: +0.50
OS_SPHERE: +0.25
OD_AXIS: 091

## 2019-09-04 ASSESSMENT — EXTERNAL EXAM - RIGHT EYE: OD_EXAM: NORMAL

## 2019-09-04 ASSESSMENT — TONOMETRY
IOP_METHOD: ICARE
OD_IOP_MMHG: 17
OS_IOP_MMHG: 15

## 2019-09-04 ASSESSMENT — VISUAL ACUITY
OD_CC: 20/20
METHOD: SNELLEN - LINEAR
OS_CC: 20/20

## 2019-09-04 ASSESSMENT — CUP TO DISC RATIO
OS_RATIO: 0.4
OD_RATIO: 0.4

## 2019-09-04 ASSESSMENT — CONF VISUAL FIELD: OD_NORMAL: 1

## 2019-09-04 ASSESSMENT — SLIT LAMP EXAM - LIDS
COMMENTS: NORMAL
COMMENTS: NORMAL

## 2019-09-04 ASSESSMENT — EXTERNAL EXAM - LEFT EYE: OS_EXAM: NORMAL

## 2019-09-04 NOTE — ASSESSMENT & PLAN NOTE
7/22/2019 - Partial left 6th nerve palsy vs restrictive orbitopathy secondary to the extensive orbital fractures on the left. I suspect more the latter given that the base of skull fracture extended to the left side of the clivus, but there is air around the cavernous sinus so could have also had injury to 6th in that area of well. There is significant intraorbital air and a nasal, small floor and lateral wall fracture, but none significantly displaced. Forced duction suggested more of a restrictive process. Thus discussed that since early would like to continue to monitor for any recovery. Did place an in office PAT and tolerated a 30 base out prism over the left lens and could begin to fuse. Thus discussed getting a +0.50 lesn ou and possibly return to place the press on if the optical shop cannot do. Would like to re-eval in 6 weeks. Discussed that might eventually require strabismus repair.   9/4/2019 - Essentially resolved 6th nerve palsy. Some small residual E(T) that appears to have a small accommodative component that improves with the glasses rx. Thus discussed PRN follow up if still difficulty in control in the next few months.
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
## 2019-10-16 ENCOUNTER — HOSPITAL ENCOUNTER (OUTPATIENT)
Dept: RADIOLOGY | Facility: MEDICAL CENTER | Age: 44
End: 2019-10-16
Attending: NEUROLOGICAL SURGERY
Payer: COMMERCIAL

## 2019-10-16 DIAGNOSIS — R51.9 NONINTRACTABLE HEADACHE, UNSPECIFIED CHRONICITY PATTERN, UNSPECIFIED HEADACHE TYPE: ICD-10-CM

## 2019-10-16 PROCEDURE — 70450 CT HEAD/BRAIN W/O DYE: CPT

## 2020-03-13 ENCOUNTER — NON-PROVIDER VISIT (OUTPATIENT)
Dept: NEUROLOGY | Facility: MEDICAL CENTER | Age: 45
End: 2020-03-13
Payer: COMMERCIAL

## 2020-03-13 DIAGNOSIS — R56.9 SEIZURE (HCC): ICD-10-CM

## 2020-03-13 PROCEDURE — 95816 EEG AWAKE AND DROWSY: CPT | Performed by: PSYCHIATRY & NEUROLOGY
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
## 2020-07-28 ENCOUNTER — TELEPHONE (OUTPATIENT)
Dept: NEUROLOGY | Facility: MEDICAL CENTER | Age: 45
End: 2020-07-28

## 2020-07-28 NOTE — TELEPHONE ENCOUNTER
I returned the pt mother aldo's call and she said that Govind is incarcerated at [facility] and the officers are refusing to give him his Asprin despite him having chest pain. She said the Asprin was ordered by Dr Lindo. And she asking for a Dr's note that states for him to take the Asprin, from Dr Lindo.

## 2020-07-29 ENCOUNTER — TELEPHONE (OUTPATIENT)
Dept: NEUROLOGY | Facility: MEDICAL CENTER | Age: 45
End: 2020-07-29

## 2020-07-30 ENCOUNTER — TELEPHONE (OUTPATIENT)
Dept: PHYSICAL MEDICINE AND REHAB | Facility: REHABILITATION | Age: 45
End: 2020-07-30

## 2020-07-30 NOTE — TELEPHONE ENCOUNTER
Govind's mother, Jordyn Whiting, called and said her son is incarcerated and he needs to have statement from Dr. New that he needs to take aspirin a day for them to give to him. She said he has not had since 6/27/2020.

## 2020-08-03 ENCOUNTER — TELEPHONE (OUTPATIENT)
Dept: PHYSICAL MEDICINE AND REHAB | Facility: REHABILITATION | Age: 45
End: 2020-08-03

## 2020-08-03 NOTE — TELEPHONE ENCOUNTER
Jordyn called again and said the facility where her son is incarcerated has not received the notice that he is to take aspirin every day. I let her know I don't know the  that was supposed to be taking care of this, but I will check with Dr. New to see.

## 2021-01-14 ENCOUNTER — PATIENT OUTREACH (OUTPATIENT)
Dept: HEALTH INFORMATION MANAGEMENT | Facility: OTHER | Age: 46
End: 2021-01-14

## 2021-01-14 ENCOUNTER — APPOINTMENT (OUTPATIENT)
Dept: RADIOLOGY | Facility: MEDICAL CENTER | Age: 46
End: 2021-01-14
Attending: EMERGENCY MEDICINE
Payer: MEDICAID

## 2021-01-14 ENCOUNTER — HOSPITAL ENCOUNTER (EMERGENCY)
Facility: MEDICAL CENTER | Age: 46
End: 2021-01-14
Attending: EMERGENCY MEDICINE
Payer: MEDICAID

## 2021-01-14 VITALS
BODY MASS INDEX: 27.47 KG/M2 | SYSTOLIC BLOOD PRESSURE: 130 MMHG | HEIGHT: 67 IN | RESPIRATION RATE: 19 BRPM | TEMPERATURE: 98.6 F | WEIGHT: 175 LBS | OXYGEN SATURATION: 97 % | DIASTOLIC BLOOD PRESSURE: 83 MMHG | HEART RATE: 68 BPM

## 2021-01-14 DIAGNOSIS — M54.6 ACUTE RIGHT-SIDED THORACIC BACK PAIN: ICD-10-CM

## 2021-01-14 LAB
ALBUMIN SERPL BCP-MCNC: 4.2 G/DL (ref 3.2–4.9)
ALBUMIN/GLOB SERPL: 1.4 G/DL
ALP SERPL-CCNC: 72 U/L (ref 30–99)
ALT SERPL-CCNC: 18 U/L (ref 2–50)
ANION GAP SERPL CALC-SCNC: 10 MMOL/L (ref 7–16)
AST SERPL-CCNC: 20 U/L (ref 12–45)
BASOPHILS # BLD AUTO: 0.3 % (ref 0–1.8)
BASOPHILS # BLD: 0.02 K/UL (ref 0–0.12)
BILIRUB SERPL-MCNC: 0.7 MG/DL (ref 0.1–1.5)
BUN SERPL-MCNC: 9 MG/DL (ref 8–22)
CALCIUM SERPL-MCNC: 9.2 MG/DL (ref 8.5–10.5)
CHLORIDE SERPL-SCNC: 103 MMOL/L (ref 96–112)
CO2 SERPL-SCNC: 25 MMOL/L (ref 20–33)
CREAT SERPL-MCNC: 0.97 MG/DL (ref 0.5–1.4)
D DIMER PPP IA.FEU-MCNC: 0.32 UG/ML (FEU) (ref 0–0.5)
EKG IMPRESSION: NORMAL
EOSINOPHIL # BLD AUTO: 0.31 K/UL (ref 0–0.51)
EOSINOPHIL NFR BLD: 4.4 % (ref 0–6.9)
ERYTHROCYTE [DISTWIDTH] IN BLOOD BY AUTOMATED COUNT: 45.4 FL (ref 35.9–50)
GLOBULIN SER CALC-MCNC: 2.9 G/DL (ref 1.9–3.5)
GLUCOSE SERPL-MCNC: 109 MG/DL (ref 65–99)
HCT VFR BLD AUTO: 46.1 % (ref 42–52)
HGB BLD-MCNC: 15.1 G/DL (ref 14–18)
IMM GRANULOCYTES # BLD AUTO: 0.02 K/UL (ref 0–0.11)
IMM GRANULOCYTES NFR BLD AUTO: 0.3 % (ref 0–0.9)
LIPASE SERPL-CCNC: 19 U/L (ref 11–82)
LYMPHOCYTES # BLD AUTO: 2 K/UL (ref 1–4.8)
LYMPHOCYTES NFR BLD: 28.1 % (ref 22–41)
MCH RBC QN AUTO: 30.6 PG (ref 27–33)
MCHC RBC AUTO-ENTMCNC: 32.8 G/DL (ref 33.7–35.3)
MCV RBC AUTO: 93.5 FL (ref 81.4–97.8)
MONOCYTES # BLD AUTO: 0.62 K/UL (ref 0–0.85)
MONOCYTES NFR BLD AUTO: 8.7 % (ref 0–13.4)
NEUTROPHILS # BLD AUTO: 4.14 K/UL (ref 1.82–7.42)
NEUTROPHILS NFR BLD: 58.2 % (ref 44–72)
NRBC # BLD AUTO: 0 K/UL
NRBC BLD-RTO: 0 /100 WBC
PLATELET # BLD AUTO: 197 K/UL (ref 164–446)
PMV BLD AUTO: 10.5 FL (ref 9–12.9)
POTASSIUM SERPL-SCNC: 4 MMOL/L (ref 3.6–5.5)
PROT SERPL-MCNC: 7.1 G/DL (ref 6–8.2)
RBC # BLD AUTO: 4.93 M/UL (ref 4.7–6.1)
SODIUM SERPL-SCNC: 138 MMOL/L (ref 135–145)
TROPONIN T SERPL-MCNC: <6 NG/L (ref 6–19)
WBC # BLD AUTO: 7.1 K/UL (ref 4.8–10.8)

## 2021-01-14 PROCEDURE — 85025 COMPLETE CBC W/AUTO DIFF WBC: CPT

## 2021-01-14 PROCEDURE — 71045 X-RAY EXAM CHEST 1 VIEW: CPT

## 2021-01-14 PROCEDURE — 93005 ELECTROCARDIOGRAM TRACING: CPT

## 2021-01-14 PROCEDURE — 83690 ASSAY OF LIPASE: CPT

## 2021-01-14 PROCEDURE — 80053 COMPREHEN METABOLIC PANEL: CPT

## 2021-01-14 PROCEDURE — 36415 COLL VENOUS BLD VENIPUNCTURE: CPT

## 2021-01-14 PROCEDURE — 84484 ASSAY OF TROPONIN QUANT: CPT

## 2021-01-14 PROCEDURE — 94760 N-INVAS EAR/PLS OXIMETRY 1: CPT

## 2021-01-14 PROCEDURE — 99284 EMERGENCY DEPT VISIT MOD MDM: CPT

## 2021-01-14 PROCEDURE — 85379 FIBRIN DEGRADATION QUANT: CPT

## 2021-01-14 PROCEDURE — 93005 ELECTROCARDIOGRAM TRACING: CPT | Performed by: EMERGENCY MEDICINE

## 2021-01-14 RX ORDER — IBUPROFEN 600 MG/1
600 TABLET ORAL EVERY 6 HOURS PRN
Qty: 20 TAB | Refills: 0 | Status: SHIPPED | OUTPATIENT
Start: 2021-01-14 | End: 2023-12-11

## 2021-01-14 ASSESSMENT — LIFESTYLE VARIABLES
CONSUMPTION TOTAL: INCOMPLETE
DOES PATIENT WANT TO STOP DRINKING: NO
DO YOU DRINK ALCOHOL: NO
TOTAL SCORE: 0
EVER FELT BAD OR GUILTY ABOUT YOUR DRINKING: NO
TOTAL SCORE: 0
TOTAL SCORE: 0
EVER HAD A DRINK FIRST THING IN THE MORNING TO STEADY YOUR NERVES TO GET RID OF A HANGOVER: NO
HAVE PEOPLE ANNOYED YOU BY CRITICIZING YOUR DRINKING: NO
HAVE YOU EVER FELT YOU SHOULD CUT DOWN ON YOUR DRINKING: NO

## 2021-01-14 ASSESSMENT — FIBROSIS 4 INDEX: FIB4 SCORE: 0.33

## 2021-01-14 NOTE — Clinical Note
Govind Corral was seen and treated in our emergency department on 1/14/2021.  He may return to work on 01/18/2021.       If you have any questions or concerns, please don't hesitate to call.      Cirilo Valdez M.D.

## 2021-01-14 NOTE — PROGRESS NOTES
Received incoming order from [facility] to establish patient with PCP. Patient currently has active [place] [employer] Insurance so can be seen at [facility]. CHW called patient and offered to assist patient in establishing with PCP. Patient accepted CHW's services. Patient requested this information be texted to him.     CHW will be calling  to establish patient with PCP.

## 2021-01-14 NOTE — ED NOTES
Discharge instructions reviewed and signed with patient. No further questions at this time. Work note provided with patient. Patient ambulated out of ED with a steady gait.

## 2021-01-14 NOTE — ED TRIAGE NOTES
Chief Complaint   Patient presents with   • Chest Pain     x3 days     Pt ambulatory to triage for above complaint. Pt states he started out with pain in his back below his right shoulder and now he feels like it's moving to his chest. Pain worse with deep inspiration and movement.    Pt is alert/oriented and follows commands. Pt speaking in full sentences and responds appropriately to questions. No acute distress noted in triage and respirations are even and unlabored.     Pt placed in lobby and educated on triage process. Pt encouraged to alert staff for any changes in condition.
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
## 2021-03-27 ENCOUNTER — HOSPITAL ENCOUNTER (EMERGENCY)
Facility: MEDICAL CENTER | Age: 46
End: 2021-03-27
Attending: EMERGENCY MEDICINE
Payer: MEDICAID

## 2021-03-27 ENCOUNTER — APPOINTMENT (OUTPATIENT)
Dept: RADIOLOGY | Facility: MEDICAL CENTER | Age: 46
End: 2021-03-27
Attending: EMERGENCY MEDICINE
Payer: MEDICAID

## 2021-03-27 VITALS
RESPIRATION RATE: 16 BRPM | OXYGEN SATURATION: 96 % | TEMPERATURE: 97.4 F | HEIGHT: 67 IN | HEART RATE: 51 BPM | SYSTOLIC BLOOD PRESSURE: 125 MMHG | WEIGHT: 164.9 LBS | BODY MASS INDEX: 25.88 KG/M2 | DIASTOLIC BLOOD PRESSURE: 74 MMHG

## 2021-03-27 DIAGNOSIS — M54.50 LUMBAR BACK PAIN: ICD-10-CM

## 2021-03-27 LAB
ALBUMIN SERPL BCP-MCNC: 4.2 G/DL (ref 3.2–4.9)
ALBUMIN/GLOB SERPL: 1.3 G/DL
ALP SERPL-CCNC: 80 U/L (ref 30–99)
ALT SERPL-CCNC: 28 U/L (ref 2–50)
ANION GAP SERPL CALC-SCNC: 9 MMOL/L (ref 7–16)
AST SERPL-CCNC: 22 U/L (ref 12–45)
BASOPHILS # BLD AUTO: 0.2 % (ref 0–1.8)
BASOPHILS # BLD: 0.02 K/UL (ref 0–0.12)
BILIRUB SERPL-MCNC: 1.1 MG/DL (ref 0.1–1.5)
BUN SERPL-MCNC: 14 MG/DL (ref 8–22)
CALCIUM SERPL-MCNC: 9.3 MG/DL (ref 8.5–10.5)
CHLORIDE SERPL-SCNC: 101 MMOL/L (ref 96–112)
CO2 SERPL-SCNC: 27 MMOL/L (ref 20–33)
CREAT SERPL-MCNC: 1.02 MG/DL (ref 0.5–1.4)
D DIMER PPP IA.FEU-MCNC: <0.27 UG/ML (FEU) (ref 0–0.5)
EKG IMPRESSION: NORMAL
EOSINOPHIL # BLD AUTO: 0.23 K/UL (ref 0–0.51)
EOSINOPHIL NFR BLD: 2.8 % (ref 0–6.9)
ERYTHROCYTE [DISTWIDTH] IN BLOOD BY AUTOMATED COUNT: 45.6 FL (ref 35.9–50)
GLOBULIN SER CALC-MCNC: 3.3 G/DL (ref 1.9–3.5)
GLUCOSE SERPL-MCNC: 87 MG/DL (ref 65–99)
HCT VFR BLD AUTO: 46.6 % (ref 42–52)
HGB BLD-MCNC: 15.5 G/DL (ref 14–18)
IMM GRANULOCYTES # BLD AUTO: 0.04 K/UL (ref 0–0.11)
IMM GRANULOCYTES NFR BLD AUTO: 0.5 % (ref 0–0.9)
LIPASE SERPL-CCNC: 18 U/L (ref 11–82)
LYMPHOCYTES # BLD AUTO: 2.16 K/UL (ref 1–4.8)
LYMPHOCYTES NFR BLD: 25.9 % (ref 22–41)
MCH RBC QN AUTO: 30.6 PG (ref 27–33)
MCHC RBC AUTO-ENTMCNC: 33.3 G/DL (ref 33.7–35.3)
MCV RBC AUTO: 92.1 FL (ref 81.4–97.8)
MONOCYTES # BLD AUTO: 0.79 K/UL (ref 0–0.85)
MONOCYTES NFR BLD AUTO: 9.5 % (ref 0–13.4)
NEUTROPHILS # BLD AUTO: 5.1 K/UL (ref 1.82–7.42)
NEUTROPHILS NFR BLD: 61.1 % (ref 44–72)
NRBC # BLD AUTO: 0 K/UL
NRBC BLD-RTO: 0 /100 WBC
PLATELET # BLD AUTO: 175 K/UL (ref 164–446)
PMV BLD AUTO: 11 FL (ref 9–12.9)
POTASSIUM SERPL-SCNC: 4.3 MMOL/L (ref 3.6–5.5)
PROT SERPL-MCNC: 7.5 G/DL (ref 6–8.2)
RBC # BLD AUTO: 5.06 M/UL (ref 4.7–6.1)
SODIUM SERPL-SCNC: 137 MMOL/L (ref 135–145)
WBC # BLD AUTO: 8.3 K/UL (ref 4.8–10.8)

## 2021-03-27 PROCEDURE — 36415 COLL VENOUS BLD VENIPUNCTURE: CPT

## 2021-03-27 PROCEDURE — 80053 COMPREHEN METABOLIC PANEL: CPT

## 2021-03-27 PROCEDURE — 93005 ELECTROCARDIOGRAM TRACING: CPT | Performed by: EMERGENCY MEDICINE

## 2021-03-27 PROCEDURE — 85379 FIBRIN DEGRADATION QUANT: CPT

## 2021-03-27 PROCEDURE — 700102 HCHG RX REV CODE 250 W/ 637 OVERRIDE(OP): Performed by: EMERGENCY MEDICINE

## 2021-03-27 PROCEDURE — 85025 COMPLETE CBC W/AUTO DIFF WBC: CPT

## 2021-03-27 PROCEDURE — 71045 X-RAY EXAM CHEST 1 VIEW: CPT

## 2021-03-27 PROCEDURE — 94760 N-INVAS EAR/PLS OXIMETRY 1: CPT

## 2021-03-27 PROCEDURE — 700111 HCHG RX REV CODE 636 W/ 250 OVERRIDE (IP): Performed by: EMERGENCY MEDICINE

## 2021-03-27 PROCEDURE — A9270 NON-COVERED ITEM OR SERVICE: HCPCS | Performed by: EMERGENCY MEDICINE

## 2021-03-27 PROCEDURE — 83690 ASSAY OF LIPASE: CPT

## 2021-03-27 PROCEDURE — 99285 EMERGENCY DEPT VISIT HI MDM: CPT

## 2021-03-27 PROCEDURE — 96374 THER/PROPH/DIAG INJ IV PUSH: CPT

## 2021-03-27 PROCEDURE — 74176 CT ABD & PELVIS W/O CONTRAST: CPT

## 2021-03-27 RX ORDER — CYCLOBENZAPRINE HCL 5 MG
5-10 TABLET ORAL 3 TIMES DAILY PRN
Qty: 30 TABLET | Refills: 0 | Status: SHIPPED | OUTPATIENT
Start: 2021-03-27 | End: 2023-12-11

## 2021-03-27 RX ORDER — CYCLOBENZAPRINE HCL 10 MG
10 TABLET ORAL ONCE
Status: COMPLETED | OUTPATIENT
Start: 2021-03-27 | End: 2021-03-27

## 2021-03-27 RX ORDER — KETOROLAC TROMETHAMINE 30 MG/ML
30 INJECTION, SOLUTION INTRAMUSCULAR; INTRAVENOUS ONCE
Status: COMPLETED | OUTPATIENT
Start: 2021-03-27 | End: 2021-03-27

## 2021-03-27 RX ADMIN — KETOROLAC TROMETHAMINE 30 MG: 30 INJECTION, SOLUTION INTRAMUSCULAR at 10:29

## 2021-03-27 RX ADMIN — CYCLOBENZAPRINE 10 MG: 10 TABLET, FILM COATED ORAL at 10:29

## 2021-03-27 ASSESSMENT — FIBROSIS 4 INDEX: FIB4 SCORE: 1.05

## 2021-03-27 ASSESSMENT — LIFESTYLE VARIABLES: DO YOU DRINK ALCOHOL: NO

## 2021-03-27 NOTE — ED NOTES
PIV removed. Discharge instructions discussed with pt, verbalizes understanding. All belongings with pt when leaving unit. Pt amb to  with steady gait.
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
## 2021-03-27 NOTE — ED TRIAGE NOTES
"Govind Edgardo Corral  Chief Complaint   Patient presents with   • Back Pain     Pt presents with a complaint of back pain that gets worse with inspiration. Pt states the pain is significantly worse when he is laying down. Pt denies trauma.      Pt ambulatory to triage with above complaint.     /74   Pulse 63   Temp 36.4 °C (97.5 °F) (Temporal)   Resp 16   Ht 1.702 m (5' 7\")   Wt 74.8 kg (164 lb 14.5 oz)   SpO2 96%   BMI 25.83 kg/m²     Pt informed of triage process and encouraged to notify staff of any changes or concerns. Pt verbalized understanding of instructions. Apologized for long wait time. Pt placed back in lobby.     "

## 2022-10-16 ENCOUNTER — APPOINTMENT (OUTPATIENT)
Dept: RADIOLOGY | Facility: MEDICAL CENTER | Age: 47
DRG: 482 | End: 2022-10-16
Attending: EMERGENCY MEDICINE
Payer: MEDICAID

## 2022-10-16 ENCOUNTER — APPOINTMENT (OUTPATIENT)
Dept: RADIOLOGY | Facility: MEDICAL CENTER | Age: 47
DRG: 482 | End: 2022-10-16
Attending: ORTHOPAEDIC SURGERY
Payer: MEDICAID

## 2022-10-16 ENCOUNTER — ANESTHESIA EVENT (OUTPATIENT)
Dept: SURGERY | Facility: MEDICAL CENTER | Age: 47
DRG: 482 | End: 2022-10-16
Payer: MEDICAID

## 2022-10-16 ENCOUNTER — ANESTHESIA (OUTPATIENT)
Dept: SURGERY | Facility: MEDICAL CENTER | Age: 47
DRG: 482 | End: 2022-10-16
Payer: MEDICAID

## 2022-10-16 ENCOUNTER — HOSPITAL ENCOUNTER (INPATIENT)
Facility: MEDICAL CENTER | Age: 47
LOS: 3 days | DRG: 482 | End: 2022-10-19
Attending: EMERGENCY MEDICINE | Admitting: ORTHOPAEDIC SURGERY
Payer: MEDICAID

## 2022-10-16 DIAGNOSIS — G89.18 ACUTE POST-OPERATIVE PAIN: ICD-10-CM

## 2022-10-16 DIAGNOSIS — S72.142A CLOSED DISPLACED INTERTROCHANTERIC FRACTURE OF LEFT FEMUR, INITIAL ENCOUNTER (HCC): ICD-10-CM

## 2022-10-16 DIAGNOSIS — F15.10 METHAMPHETAMINE USE (HCC): ICD-10-CM
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
## 2022-10-16 DIAGNOSIS — M25.552 LEFT HIP PAIN: ICD-10-CM

## 2022-10-16 DIAGNOSIS — S72.142A CLOSED COMMINUTED INTERTROCHANTERIC FRACTURE OF LEFT FEMUR, INITIAL ENCOUNTER (HCC): ICD-10-CM

## 2022-10-16 LAB
ABO + RH BLD: NORMAL
ABO GROUP BLD: NORMAL
ALBUMIN SERPL BCP-MCNC: 3.9 G/DL (ref 3.2–4.9)
ALBUMIN/GLOB SERPL: 1.4 G/DL
ALP SERPL-CCNC: 78 U/L (ref 30–99)
ALT SERPL-CCNC: 47 U/L (ref 2–50)
ANION GAP SERPL CALC-SCNC: 10 MMOL/L (ref 7–16)
APTT PPP: 22.1 SEC (ref 24.7–36)
AST SERPL-CCNC: 32 U/L (ref 12–45)
BILIRUB SERPL-MCNC: 1.2 MG/DL (ref 0.1–1.5)
BLD GP AB SCN SERPL QL: NORMAL
BUN SERPL-MCNC: 18 MG/DL (ref 8–22)
CALCIUM SERPL-MCNC: 9 MG/DL (ref 8.5–10.5)
CHLORIDE SERPL-SCNC: 105 MMOL/L (ref 96–112)
CO2 SERPL-SCNC: 23 MMOL/L (ref 20–33)
CREAT SERPL-MCNC: 1.34 MG/DL (ref 0.5–1.4)
ERYTHROCYTE [DISTWIDTH] IN BLOOD BY AUTOMATED COUNT: 47.3 FL (ref 35.9–50)
ETHANOL BLD-MCNC: <10.1 MG/DL
GFR SERPLBLD CREATININE-BSD FMLA CKD-EPI: 66 ML/MIN/1.73 M 2
GLOBULIN SER CALC-MCNC: 2.8 G/DL (ref 1.9–3.5)
GLUCOSE SERPL-MCNC: 125 MG/DL (ref 65–99)
HCT VFR BLD AUTO: 45.8 % (ref 42–52)
HGB BLD-MCNC: 15.6 G/DL (ref 14–18)
INR PPP: 1.07 (ref 0.87–1.13)
MCH RBC QN AUTO: 31.8 PG (ref 27–33)
MCHC RBC AUTO-ENTMCNC: 34.1 G/DL (ref 33.7–35.3)
MCV RBC AUTO: 93.3 FL (ref 81.4–97.8)
PLATELET # BLD AUTO: 218 K/UL (ref 164–446)
PMV BLD AUTO: 10 FL (ref 9–12.9)
POTASSIUM SERPL-SCNC: 4.5 MMOL/L (ref 3.6–5.5)
PROT SERPL-MCNC: 6.7 G/DL (ref 6–8.2)
PROTHROMBIN TIME: 13.8 SEC (ref 12–14.6)
RBC # BLD AUTO: 4.91 M/UL (ref 4.7–6.1)
RH BLD: NORMAL
SODIUM SERPL-SCNC: 138 MMOL/L (ref 135–145)
WBC # BLD AUTO: 9.1 K/UL (ref 4.8–10.8)

## 2022-10-16 PROCEDURE — 700111 HCHG RX REV CODE 636 W/ 250 OVERRIDE (IP): Performed by: ANESTHESIOLOGY

## 2022-10-16 PROCEDURE — 72125 CT NECK SPINE W/O DYE: CPT

## 2022-10-16 PROCEDURE — 96374 THER/PROPH/DIAG INJ IV PUSH: CPT

## 2022-10-16 PROCEDURE — 90715 TDAP VACCINE 7 YRS/> IM: CPT | Performed by: EMERGENCY MEDICINE

## 2022-10-16 PROCEDURE — 96376 TX/PRO/DX INJ SAME DRUG ADON: CPT

## 2022-10-16 PROCEDURE — 86900 BLOOD TYPING SEROLOGIC ABO: CPT

## 2022-10-16 PROCEDURE — 73706 CT ANGIO LWR EXTR W/O&W/DYE: CPT | Mod: LT

## 2022-10-16 PROCEDURE — 3E0234Z INTRODUCTION OF SERUM, TOXOID AND VACCINE INTO MUSCLE, PERCUTANEOUS APPROACH: ICD-10-PCS | Performed by: EMERGENCY MEDICINE

## 2022-10-16 PROCEDURE — 73502 X-RAY EXAM HIP UNI 2-3 VIEWS: CPT | Mod: LT

## 2022-10-16 PROCEDURE — 99291 CRITICAL CARE FIRST HOUR: CPT

## 2022-10-16 PROCEDURE — 160035 HCHG PACU - 1ST 60 MINS PHASE I: Performed by: ORTHOPAEDIC SURGERY

## 2022-10-16 PROCEDURE — 01234 ANES OPN UPR 2/3 FEM RAD RES: CPT | Performed by: ANESTHESIOLOGY

## 2022-10-16 PROCEDURE — 99140 ANES COMP EMERGENCY COND: CPT | Performed by: ANESTHESIOLOGY

## 2022-10-16 PROCEDURE — 86850 RBC ANTIBODY SCREEN: CPT

## 2022-10-16 PROCEDURE — 160009 HCHG ANES TIME/MIN: Performed by: ORTHOPAEDIC SURGERY

## 2022-10-16 PROCEDURE — 160029 HCHG SURGERY MINUTES - 1ST 30 MINS LEVEL 4: Performed by: ORTHOPAEDIC SURGERY

## 2022-10-16 PROCEDURE — 71260 CT THORAX DX C+: CPT

## 2022-10-16 PROCEDURE — 770001 HCHG ROOM/CARE - MED/SURG/GYN PRIV*

## 2022-10-16 PROCEDURE — 85610 PROTHROMBIN TIME: CPT

## 2022-10-16 PROCEDURE — 700111 HCHG RX REV CODE 636 W/ 250 OVERRIDE (IP): Performed by: EMERGENCY MEDICINE

## 2022-10-16 PROCEDURE — 160036 HCHG PACU - EA ADDL 30 MINS PHASE I: Performed by: ORTHOPAEDIC SURGERY

## 2022-10-16 PROCEDURE — 72131 CT LUMBAR SPINE W/O DYE: CPT

## 2022-10-16 PROCEDURE — 80053 COMPREHEN METABOLIC PANEL: CPT

## 2022-10-16 PROCEDURE — 36415 COLL VENOUS BLD VENIPUNCTURE: CPT

## 2022-10-16 PROCEDURE — 72128 CT CHEST SPINE W/O DYE: CPT

## 2022-10-16 PROCEDURE — 160002 HCHG RECOVERY MINUTES (STAT): Performed by: ORTHOPAEDIC SURGERY

## 2022-10-16 PROCEDURE — 70450 CT HEAD/BRAIN W/O DYE: CPT

## 2022-10-16 PROCEDURE — 85730 THROMBOPLASTIN TIME PARTIAL: CPT

## 2022-10-16 PROCEDURE — 0QS706Z REPOSITION LEFT UPPER FEMUR WITH INTRAMEDULLARY INTERNAL FIXATION DEVICE, OPEN APPROACH: ICD-10-PCS | Performed by: ORTHOPAEDIC SURGERY

## 2022-10-16 PROCEDURE — 71045 X-RAY EXAM CHEST 1 VIEW: CPT

## 2022-10-16 PROCEDURE — A9270 NON-COVERED ITEM OR SERVICE: HCPCS | Performed by: ANESTHESIOLOGY

## 2022-10-16 PROCEDURE — 73551 X-RAY EXAM OF FEMUR 1: CPT | Mod: LT

## 2022-10-16 PROCEDURE — 82077 ASSAY SPEC XCP UR&BREATH IA: CPT

## 2022-10-16 PROCEDURE — 86901 BLOOD TYPING SEROLOGIC RH(D): CPT

## 2022-10-16 PROCEDURE — 160048 HCHG OR STATISTICAL LEVEL 1-5: Performed by: ORTHOPAEDIC SURGERY

## 2022-10-16 PROCEDURE — G0390 TRAUMA RESPONS W/HOSP CRITI: HCPCS

## 2022-10-16 PROCEDURE — 85027 COMPLETE CBC AUTOMATED: CPT

## 2022-10-16 PROCEDURE — 160041 HCHG SURGERY MINUTES - EA ADDL 1 MIN LEVEL 4: Performed by: ORTHOPAEDIC SURGERY

## 2022-10-16 PROCEDURE — 72170 X-RAY EXAM OF PELVIS: CPT

## 2022-10-16 PROCEDURE — 90471 IMMUNIZATION ADMIN: CPT

## 2022-10-16 PROCEDURE — 96375 TX/PRO/DX INJ NEW DRUG ADDON: CPT

## 2022-10-16 PROCEDURE — 700101 HCHG RX REV CODE 250: Performed by: ANESTHESIOLOGY

## 2022-10-16 PROCEDURE — 700105 HCHG RX REV CODE 258: Performed by: ANESTHESIOLOGY

## 2022-10-16 PROCEDURE — 700117 HCHG RX CONTRAST REV CODE 255: Performed by: EMERGENCY MEDICINE

## 2022-10-16 PROCEDURE — 700102 HCHG RX REV CODE 250 W/ 637 OVERRIDE(OP): Performed by: ANESTHESIOLOGY

## 2022-10-16 PROCEDURE — C1713 ANCHOR/SCREW BN/BN,TIS/BN: HCPCS | Performed by: ORTHOPAEDIC SURGERY

## 2022-10-16 DEVICE — NAIL STERILE TI CANN TFNA 10MM 125 DEG 170MM: Type: IMPLANTABLE DEVICE | Site: HIP | Status: FUNCTIONAL

## 2022-10-16 DEVICE — SCREW FOR IM NAILS TI LOCKING WITH T25 STARDRIVE 5.0MM 36MM (2TX2=4): Type: IMPLANTABLE DEVICE | Site: HIP | Status: FUNCTIONAL

## 2022-10-16 DEVICE — IMPLANTABLE DEVICE: Type: IMPLANTABLE DEVICE | Site: HIP | Status: FUNCTIONAL

## 2022-10-16 RX ORDER — AMOXICILLIN 250 MG
1 CAPSULE ORAL NIGHTLY
Status: DISCONTINUED | OUTPATIENT
Start: 2022-10-16 | End: 2022-10-19 | Stop reason: HOSPADM

## 2022-10-16 RX ORDER — OXYCODONE HYDROCHLORIDE 5 MG/1
5 TABLET ORAL
Status: DISCONTINUED | OUTPATIENT
Start: 2022-10-16 | End: 2022-10-19 | Stop reason: HOSPADM

## 2022-10-16 RX ORDER — MIDAZOLAM HYDROCHLORIDE 1 MG/ML
1 INJECTION INTRAMUSCULAR; INTRAVENOUS
Status: DISCONTINUED | OUTPATIENT
Start: 2022-10-16 | End: 2022-10-16 | Stop reason: HOSPADM

## 2022-10-16 RX ORDER — HYDROMORPHONE HYDROCHLORIDE 1 MG/ML
0.5 INJECTION, SOLUTION INTRAMUSCULAR; INTRAVENOUS; SUBCUTANEOUS
Status: DISCONTINUED | OUTPATIENT
Start: 2022-10-16 | End: 2022-10-19 | Stop reason: HOSPADM

## 2022-10-16 RX ORDER — OXYCODONE HCL 5 MG/5 ML
5 SOLUTION, ORAL ORAL
Status: DISCONTINUED | OUTPATIENT
Start: 2022-10-16 | End: 2022-10-16 | Stop reason: HOSPADM

## 2022-10-16 RX ORDER — ROCURONIUM BROMIDE 10 MG/ML
INJECTION, SOLUTION INTRAVENOUS PRN
Status: DISCONTINUED | OUTPATIENT
Start: 2022-10-16 | End: 2022-10-16 | Stop reason: SURG

## 2022-10-16 RX ORDER — LABETALOL HYDROCHLORIDE 5 MG/ML
5 INJECTION, SOLUTION INTRAVENOUS
Status: DISCONTINUED | OUTPATIENT
Start: 2022-10-16 | End: 2022-10-16 | Stop reason: HOSPADM

## 2022-10-16 RX ORDER — HYDROMORPHONE HYDROCHLORIDE 1 MG/ML
0.2 INJECTION, SOLUTION INTRAMUSCULAR; INTRAVENOUS; SUBCUTANEOUS
Status: DISCONTINUED | OUTPATIENT
Start: 2022-10-16 | End: 2022-10-16 | Stop reason: HOSPADM

## 2022-10-16 RX ORDER — DEXAMETHASONE SODIUM PHOSPHATE 4 MG/ML
INJECTION, SOLUTION INTRA-ARTICULAR; INTRALESIONAL; INTRAMUSCULAR; INTRAVENOUS; SOFT TISSUE PRN
Status: DISCONTINUED | OUTPATIENT
Start: 2022-10-16 | End: 2022-10-16 | Stop reason: SURG

## 2022-10-16 RX ORDER — SODIUM CHLORIDE, SODIUM LACTATE, POTASSIUM CHLORIDE, CALCIUM CHLORIDE 600; 310; 30; 20 MG/100ML; MG/100ML; MG/100ML; MG/100ML
INJECTION, SOLUTION INTRAVENOUS CONTINUOUS
Status: DISCONTINUED | OUTPATIENT
Start: 2022-10-16 | End: 2022-10-16 | Stop reason: HOSPADM

## 2022-10-16 RX ORDER — IBUPROFEN 800 MG/1
800 TABLET ORAL 3 TIMES DAILY PRN
Status: DISCONTINUED | OUTPATIENT
Start: 2022-10-20 | End: 2022-10-19 | Stop reason: HOSPADM

## 2022-10-16 RX ORDER — ACETAMINOPHEN 500 MG
1000 TABLET ORAL EVERY 6 HOURS PRN
Status: DISCONTINUED | OUTPATIENT
Start: 2022-10-22 | End: 2022-10-19 | Stop reason: HOSPADM

## 2022-10-16 RX ORDER — HYDRALAZINE HYDROCHLORIDE 20 MG/ML
5 INJECTION INTRAMUSCULAR; INTRAVENOUS
Status: DISCONTINUED | OUTPATIENT
Start: 2022-10-16 | End: 2022-10-16 | Stop reason: HOSPADM

## 2022-10-16 RX ORDER — DIPHENHYDRAMINE HYDROCHLORIDE 50 MG/ML
12.5 INJECTION INTRAMUSCULAR; INTRAVENOUS
Status: DISCONTINUED | OUTPATIENT
Start: 2022-10-16 | End: 2022-10-16 | Stop reason: HOSPADM

## 2022-10-16 RX ORDER — ALBUTEROL SULFATE 2.5 MG/3ML
2.5 SOLUTION RESPIRATORY (INHALATION)
Status: DISCONTINUED | OUTPATIENT
Start: 2022-10-16 | End: 2022-10-16 | Stop reason: HOSPADM

## 2022-10-16 RX ORDER — DIPHENHYDRAMINE HYDROCHLORIDE 50 MG/ML
25 INJECTION INTRAMUSCULAR; INTRAVENOUS EVERY 6 HOURS PRN
Status: DISCONTINUED | OUTPATIENT
Start: 2022-10-16 | End: 2022-10-19 | Stop reason: HOSPADM

## 2022-10-16 RX ORDER — HYDROMORPHONE HYDROCHLORIDE 1 MG/ML
0.1 INJECTION, SOLUTION INTRAMUSCULAR; INTRAVENOUS; SUBCUTANEOUS
Status: DISCONTINUED | OUTPATIENT
Start: 2022-10-16 | End: 2022-10-16 | Stop reason: HOSPADM

## 2022-10-16 RX ORDER — HYDROMORPHONE HYDROCHLORIDE 1 MG/ML
0.4 INJECTION, SOLUTION INTRAMUSCULAR; INTRAVENOUS; SUBCUTANEOUS
Status: DISCONTINUED | OUTPATIENT
Start: 2022-10-16 | End: 2022-10-16 | Stop reason: HOSPADM

## 2022-10-16 RX ORDER — HYDROMORPHONE HYDROCHLORIDE 2 MG/ML
INJECTION, SOLUTION INTRAMUSCULAR; INTRAVENOUS; SUBCUTANEOUS PRN
Status: DISCONTINUED | OUTPATIENT
Start: 2022-10-16 | End: 2022-10-16 | Stop reason: SURG

## 2022-10-16 RX ORDER — ACETAMINOPHEN 500 MG
1000 TABLET ORAL EVERY 6 HOURS
Status: DISCONTINUED | OUTPATIENT
Start: 2022-10-16 | End: 2022-10-19 | Stop reason: HOSPADM

## 2022-10-16 RX ORDER — ONDANSETRON 2 MG/ML
INJECTION INTRAMUSCULAR; INTRAVENOUS PRN
Status: DISCONTINUED | OUTPATIENT
Start: 2022-10-16 | End: 2022-10-16 | Stop reason: SURG

## 2022-10-16 RX ORDER — HALOPERIDOL 5 MG/ML
1 INJECTION INTRAMUSCULAR
Status: DISCONTINUED | OUTPATIENT
Start: 2022-10-16 | End: 2022-10-16 | Stop reason: HOSPADM

## 2022-10-16 RX ORDER — OXYCODONE HCL 5 MG/5 ML
10 SOLUTION, ORAL ORAL
Status: DISCONTINUED | OUTPATIENT
Start: 2022-10-16 | End: 2022-10-16 | Stop reason: HOSPADM

## 2022-10-16 RX ORDER — OXYCODONE HCL 5 MG/5 ML
10 SOLUTION, ORAL ORAL
Status: COMPLETED | OUTPATIENT
Start: 2022-10-16 | End: 2022-10-16

## 2022-10-16 RX ORDER — ONDANSETRON 2 MG/ML
INJECTION INTRAMUSCULAR; INTRAVENOUS
Status: COMPLETED | OUTPATIENT
Start: 2022-10-16 | End: 2022-10-16

## 2022-10-16 RX ORDER — ONDANSETRON 2 MG/ML
4 INJECTION INTRAMUSCULAR; INTRAVENOUS
Status: DISCONTINUED | OUTPATIENT
Start: 2022-10-16 | End: 2022-10-16 | Stop reason: HOSPADM

## 2022-10-16 RX ORDER — DOCUSATE SODIUM 100 MG/1
100 CAPSULE, LIQUID FILLED ORAL 2 TIMES DAILY
Status: DISCONTINUED | OUTPATIENT
Start: 2022-10-16 | End: 2022-10-19 | Stop reason: HOSPADM

## 2022-10-16 RX ORDER — MIDAZOLAM HYDROCHLORIDE 1 MG/ML
INJECTION INTRAMUSCULAR; INTRAVENOUS PRN
Status: DISCONTINUED | OUTPATIENT
Start: 2022-10-16 | End: 2022-10-16 | Stop reason: SURG

## 2022-10-16 RX ORDER — KETOROLAC TROMETHAMINE 30 MG/ML
30 INJECTION, SOLUTION INTRAMUSCULAR; INTRAVENOUS EVERY 6 HOURS
Status: DISCONTINUED | OUTPATIENT
Start: 2022-10-17 | End: 2022-10-19 | Stop reason: HOSPADM

## 2022-10-16 RX ORDER — MEPERIDINE HYDROCHLORIDE 25 MG/ML
6.25 INJECTION INTRAMUSCULAR; INTRAVENOUS; SUBCUTANEOUS
Status: DISCONTINUED | OUTPATIENT
Start: 2022-10-16 | End: 2022-10-16 | Stop reason: HOSPADM

## 2022-10-16 RX ORDER — MORPHINE SULFATE 4 MG/ML
4 INJECTION INTRAVENOUS ONCE
Status: COMPLETED | OUTPATIENT
Start: 2022-10-16 | End: 2022-10-16

## 2022-10-16 RX ORDER — KETOROLAC TROMETHAMINE 30 MG/ML
INJECTION, SOLUTION INTRAMUSCULAR; INTRAVENOUS PRN
Status: DISCONTINUED | OUTPATIENT
Start: 2022-10-16 | End: 2022-10-16 | Stop reason: SURG

## 2022-10-16 RX ORDER — OXYCODONE HCL 5 MG/5 ML
5 SOLUTION, ORAL ORAL
Status: COMPLETED | OUTPATIENT
Start: 2022-10-16 | End: 2022-10-16

## 2022-10-16 RX ORDER — SODIUM CHLORIDE, SODIUM LACTATE, POTASSIUM CHLORIDE, CALCIUM CHLORIDE 600; 310; 30; 20 MG/100ML; MG/100ML; MG/100ML; MG/100ML
INJECTION, SOLUTION INTRAVENOUS
Status: DISCONTINUED | OUTPATIENT
Start: 2022-10-16 | End: 2022-10-16 | Stop reason: SURG

## 2022-10-16 RX ORDER — PHENYLEPHRINE HCL IN 0.9% NACL 0.5 MG/5ML
SYRINGE (ML) INTRAVENOUS PRN
Status: DISCONTINUED | OUTPATIENT
Start: 2022-10-16 | End: 2022-10-16 | Stop reason: SURG

## 2022-10-16 RX ORDER — BISACODYL 10 MG
10 SUPPOSITORY, RECTAL RECTAL
Status: DISCONTINUED | OUTPATIENT
Start: 2022-10-16 | End: 2022-10-19 | Stop reason: HOSPADM

## 2022-10-16 RX ORDER — MORPHINE SULFATE 4 MG/ML
INJECTION INTRAVENOUS
Status: COMPLETED | OUTPATIENT
Start: 2022-10-16 | End: 2022-10-16

## 2022-10-16 RX ORDER — OXYCODONE HYDROCHLORIDE 10 MG/1
10 TABLET ORAL
Status: DISCONTINUED | OUTPATIENT
Start: 2022-10-16 | End: 2022-10-19 | Stop reason: HOSPADM

## 2022-10-16 RX ORDER — ONDANSETRON 2 MG/ML
4 INJECTION INTRAMUSCULAR; INTRAVENOUS EVERY 4 HOURS PRN
Status: DISCONTINUED | OUTPATIENT
Start: 2022-10-16 | End: 2022-10-19 | Stop reason: HOSPADM

## 2022-10-16 RX ORDER — POLYETHYLENE GLYCOL 3350 17 G/17G
1 POWDER, FOR SOLUTION ORAL 2 TIMES DAILY PRN
Status: DISCONTINUED | OUTPATIENT
Start: 2022-10-16 | End: 2022-10-19 | Stop reason: HOSPADM

## 2022-10-16 RX ORDER — ENEMA 19; 7 G/133ML; G/133ML
1 ENEMA RECTAL
Status: DISCONTINUED | OUTPATIENT
Start: 2022-10-16 | End: 2022-10-19 | Stop reason: HOSPADM

## 2022-10-16 RX ORDER — AMOXICILLIN 250 MG
1 CAPSULE ORAL
Status: DISCONTINUED | OUTPATIENT
Start: 2022-10-16 | End: 2022-10-19 | Stop reason: HOSPADM

## 2022-10-16 RX ORDER — LIDOCAINE HYDROCHLORIDE 20 MG/ML
INJECTION, SOLUTION EPIDURAL; INFILTRATION; INTRACAUDAL; PERINEURAL PRN
Status: DISCONTINUED | OUTPATIENT
Start: 2022-10-16 | End: 2022-10-16 | Stop reason: SURG

## 2022-10-16 RX ORDER — ENOXAPARIN SODIUM 100 MG/ML
40 INJECTION SUBCUTANEOUS DAILY
Status: DISCONTINUED | OUTPATIENT
Start: 2022-10-17 | End: 2022-10-19 | Stop reason: HOSPADM

## 2022-10-16 RX ORDER — CEFAZOLIN SODIUM 1 G/3ML
INJECTION, POWDER, FOR SOLUTION INTRAMUSCULAR; INTRAVENOUS PRN
Status: DISCONTINUED | OUTPATIENT
Start: 2022-10-16 | End: 2022-10-16 | Stop reason: SURG

## 2022-10-16 RX ADMIN — OXYCODONE HYDROCHLORIDE 10 MG: 5 SOLUTION ORAL at 21:51

## 2022-10-16 RX ADMIN — ROCURONIUM BROMIDE 50 MG: 10 INJECTION, SOLUTION INTRAVENOUS at 19:56

## 2022-10-16 RX ADMIN — FENTANYL CITRATE 50 MCG: 50 INJECTION, SOLUTION INTRAMUSCULAR; INTRAVENOUS at 21:51

## 2022-10-16 RX ADMIN — CLOSTRIDIUM TETANI TOXOID ANTIGEN (FORMALDEHYDE INACTIVATED), CORYNEBACTERIUM DIPHTHERIAE TOXOID ANTIGEN (FORMALDEHYDE INACTIVATED), BORDETELLA PERTUSSIS TOXOID ANTIGEN (GLUTARALDEHYDE INACTIVATED), BORDETELLA PERTUSSIS FILAMENTOUS HEMAGGLUTININ ANTIGEN (FORMALDEHYDE INACTIVATED), BORDETELLA PERTUSSIS PERTACTIN ANTIGEN, AND BORDETELLA PERTUSSIS FIMBRIAE 2/3 ANTIGEN 0.5 ML: 5; 2; 2.5; 5; 3; 5 INJECTION, SUSPENSION INTRAMUSCULAR at 16:53

## 2022-10-16 RX ADMIN — Medication 200 MCG: at 19:58

## 2022-10-16 RX ADMIN — SUGAMMADEX 200 MG: 100 INJECTION, SOLUTION INTRAVENOUS at 20:41

## 2022-10-16 RX ADMIN — PROPOFOL 150 MG: 10 INJECTION, EMULSION INTRAVENOUS at 19:56

## 2022-10-16 RX ADMIN — CEFAZOLIN 2 G: 330 INJECTION, POWDER, FOR SOLUTION INTRAMUSCULAR; INTRAVENOUS at 19:57

## 2022-10-16 RX ADMIN — HYDROMORPHONE HYDROCHLORIDE 0.5 MG: 2 INJECTION INTRAMUSCULAR; INTRAVENOUS; SUBCUTANEOUS at 20:44

## 2022-10-16 RX ADMIN — DEXAMETHASONE SODIUM PHOSPHATE 8 MG: 4 INJECTION, SOLUTION INTRA-ARTICULAR; INTRALESIONAL; INTRAMUSCULAR; INTRAVENOUS; SOFT TISSUE at 19:57

## 2022-10-16 RX ADMIN — IOHEXOL 100 ML: 350 INJECTION, SOLUTION INTRAVENOUS at 17:29

## 2022-10-16 RX ADMIN — MORPHINE SULFATE 4 MG: 4 INJECTION, SOLUTION INTRAMUSCULAR; INTRAVENOUS at 16:54

## 2022-10-16 RX ADMIN — ONDANSETRON 4 MG: 2 INJECTION INTRAMUSCULAR; INTRAVENOUS at 16:44

## 2022-10-16 RX ADMIN — LIDOCAINE HYDROCHLORIDE 100 MG: 20 INJECTION, SOLUTION EPIDURAL; INFILTRATION; INTRACAUDAL at 19:56

## 2022-10-16 RX ADMIN — ONDANSETRON 4 MG: 2 INJECTION INTRAMUSCULAR; INTRAVENOUS at 20:41

## 2022-10-16 RX ADMIN — HYDROMORPHONE HYDROCHLORIDE 0.5 MG: 2 INJECTION INTRAMUSCULAR; INTRAVENOUS; SUBCUTANEOUS at 20:17

## 2022-10-16 RX ADMIN — MORPHINE SULFATE 4 MG: 4 INJECTION INTRAVENOUS at 18:50

## 2022-10-16 RX ADMIN — MIDAZOLAM HYDROCHLORIDE 2 MG: 1 INJECTION, SOLUTION INTRAMUSCULAR; INTRAVENOUS at 19:51

## 2022-10-16 RX ADMIN — KETOROLAC TROMETHAMINE 30 MG: 30 INJECTION, SOLUTION INTRAMUSCULAR at 20:41

## 2022-10-16 RX ADMIN — MORPHINE SULFATE 4 MG: 4 INJECTION, SOLUTION INTRAMUSCULAR; INTRAVENOUS at 16:43

## 2022-10-16 RX ADMIN — SODIUM CHLORIDE, POTASSIUM CHLORIDE, SODIUM LACTATE AND CALCIUM CHLORIDE: 600; 310; 30; 20 INJECTION, SOLUTION INTRAVENOUS at 19:51

## 2022-10-16 RX ADMIN — FENTANYL CITRATE 50 MCG: 50 INJECTION, SOLUTION INTRAMUSCULAR; INTRAVENOUS at 20:49

## 2022-10-16 RX ADMIN — FENTANYL CITRATE 50 MCG: 50 INJECTION, SOLUTION INTRAMUSCULAR; INTRAVENOUS at 20:35

## 2022-10-16 ASSESSMENT — PAIN DESCRIPTION - PAIN TYPE
TYPE: SURGICAL PAIN
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
## 2022-10-17 LAB
ANION GAP SERPL CALC-SCNC: 9 MMOL/L (ref 7–16)
BUN SERPL-MCNC: 19 MG/DL (ref 8–22)
CALCIUM SERPL-MCNC: 8.6 MG/DL (ref 8.5–10.5)
CHLORIDE SERPL-SCNC: 104 MMOL/L (ref 96–112)
CO2 SERPL-SCNC: 26 MMOL/L (ref 20–33)
CREAT SERPL-MCNC: 1.28 MG/DL (ref 0.5–1.4)
ERYTHROCYTE [DISTWIDTH] IN BLOOD BY AUTOMATED COUNT: 48.8 FL (ref 35.9–50)
GFR SERPLBLD CREATININE-BSD FMLA CKD-EPI: 70 ML/MIN/1.73 M 2
GLUCOSE SERPL-MCNC: 183 MG/DL (ref 65–99)
HCT VFR BLD AUTO: 42.5 % (ref 42–52)
HGB BLD-MCNC: 13.6 G/DL (ref 14–18)
MCH RBC QN AUTO: 30.6 PG (ref 27–33)
MCHC RBC AUTO-ENTMCNC: 32 G/DL (ref 33.7–35.3)
MCV RBC AUTO: 95.7 FL (ref 81.4–97.8)
PLATELET # BLD AUTO: 202 K/UL (ref 164–446)
PMV BLD AUTO: 10.6 FL (ref 9–12.9)
POTASSIUM SERPL-SCNC: 5 MMOL/L (ref 3.6–5.5)
RBC # BLD AUTO: 4.44 M/UL (ref 4.7–6.1)
SODIUM SERPL-SCNC: 139 MMOL/L (ref 135–145)
WBC # BLD AUTO: 13.5 K/UL (ref 4.8–10.8)

## 2022-10-17 PROCEDURE — 36415 COLL VENOUS BLD VENIPUNCTURE: CPT

## 2022-10-17 PROCEDURE — 770001 HCHG ROOM/CARE - MED/SURG/GYN PRIV*

## 2022-10-17 PROCEDURE — 700102 HCHG RX REV CODE 250 W/ 637 OVERRIDE(OP): Performed by: ORTHOPAEDIC SURGERY

## 2022-10-17 PROCEDURE — 700105 HCHG RX REV CODE 258: Performed by: ORTHOPAEDIC SURGERY

## 2022-10-17 PROCEDURE — 700111 HCHG RX REV CODE 636 W/ 250 OVERRIDE (IP): Performed by: ORTHOPAEDIC SURGERY

## 2022-10-17 PROCEDURE — 85027 COMPLETE CBC AUTOMATED: CPT

## 2022-10-17 PROCEDURE — A9270 NON-COVERED ITEM OR SERVICE: HCPCS | Performed by: ORTHOPAEDIC SURGERY

## 2022-10-17 PROCEDURE — 97161 PT EVAL LOW COMPLEX 20 MIN: CPT

## 2022-10-17 PROCEDURE — 80048 BASIC METABOLIC PNL TOTAL CA: CPT

## 2022-10-17 RX ADMIN — CEFAZOLIN 2 G: 2 INJECTION, POWDER, FOR SOLUTION INTRAMUSCULAR; INTRAVENOUS at 04:20

## 2022-10-17 RX ADMIN — OXYCODONE HYDROCHLORIDE 10 MG: 10 TABLET ORAL at 21:10

## 2022-10-17 RX ADMIN — DOCUSATE SODIUM 100 MG: 100 CAPSULE, LIQUID FILLED ORAL at 18:47

## 2022-10-17 RX ADMIN — SENNOSIDES AND DOCUSATE SODIUM 1 TABLET: 50; 8.6 TABLET ORAL at 19:39

## 2022-10-17 RX ADMIN — CEFAZOLIN 2 G: 2 INJECTION, POWDER, FOR SOLUTION INTRAMUSCULAR; INTRAVENOUS at 13:39

## 2022-10-17 RX ADMIN — KETOROLAC TROMETHAMINE 30 MG: 30 INJECTION, SOLUTION INTRAMUSCULAR at 09:26

## 2022-10-17 RX ADMIN — KETOROLAC TROMETHAMINE 30 MG: 30 INJECTION, SOLUTION INTRAMUSCULAR at 14:29

## 2022-10-17 RX ADMIN — ENOXAPARIN SODIUM 40 MG: 40 INJECTION SUBCUTANEOUS at 09:26

## 2022-10-17 RX ADMIN — KETOROLAC TROMETHAMINE 30 MG: 30 INJECTION, SOLUTION INTRAMUSCULAR at 04:20

## 2022-10-17 RX ADMIN — KETOROLAC TROMETHAMINE 30 MG: 30 INJECTION, SOLUTION INTRAMUSCULAR at 19:40

## 2022-10-17 RX ADMIN — ACETAMINOPHEN 1000 MG: 500 TABLET ORAL at 18:47

## 2022-10-17 RX ADMIN — ACETAMINOPHEN 1000 MG: 500 TABLET ORAL at 13:37

## 2022-10-17 ASSESSMENT — PAIN DESCRIPTION - PAIN TYPE
TYPE: SURGICAL PAIN

## 2022-10-17 ASSESSMENT — LIFESTYLE VARIABLES
HOW MANY TIMES IN THE PAST YEAR HAVE YOU HAD 5 OR MORE DRINKS IN A DAY: 0
CONSUMPTION TOTAL: NEGATIVE
ALCOHOL_USE: NO
EVER HAD A DRINK FIRST THING IN THE MORNING TO STEADY YOUR NERVES TO GET RID OF A HANGOVER: NO
HAVE PEOPLE ANNOYED YOU BY CRITICIZING YOUR DRINKING: NO
HAVE YOU EVER FELT YOU SHOULD CUT DOWN ON YOUR DRINKING: NO
ON A TYPICAL DAY WHEN YOU DRINK ALCOHOL HOW MANY DRINKS DO YOU HAVE: 0
EVER FELT BAD OR GUILTY ABOUT YOUR DRINKING: NO
TOTAL SCORE: 0
DOES PATIENT WANT TO STOP DRINKING: NO
TOTAL SCORE: 0
TOTAL SCORE: 0
AVERAGE NUMBER OF DAYS PER WEEK YOU HAVE A DRINK CONTAINING ALCOHOL: 0

## 2022-10-17 ASSESSMENT — COGNITIVE AND FUNCTIONAL STATUS - GENERAL
STANDING UP FROM CHAIR USING ARMS: A LITTLE
MOBILITY SCORE: 18
TURNING FROM BACK TO SIDE WHILE IN FLAT BAD: A LITTLE
WALKING IN HOSPITAL ROOM: A LITTLE
MOVING TO AND FROM BED TO CHAIR: A LITTLE
CLIMB 3 TO 5 STEPS WITH RAILING: A LITTLE
CLIMB 3 TO 5 STEPS WITH RAILING: A LITTLE
WALKING IN HOSPITAL ROOM: A LITTLE
MOVING TO AND FROM BED TO CHAIR: A LITTLE
MOBILITY SCORE: 18
TURNING FROM BACK TO SIDE WHILE IN FLAT BAD: A LITTLE
SUGGESTED CMS G CODE MODIFIER DAILY ACTIVITY: CJ
HELP NEEDED FOR BATHING: A LITTLE
SUGGESTED CMS G CODE MODIFIER MOBILITY: CK
DAILY ACTIVITIY SCORE: 21
DRESSING REGULAR LOWER BODY CLOTHING: A LITTLE
SUGGESTED CMS G CODE MODIFIER MOBILITY: CK
STANDING UP FROM CHAIR USING ARMS: A LITTLE
TOILETING: A LITTLE
MOVING FROM LYING ON BACK TO SITTING ON SIDE OF FLAT BED: A LITTLE
MOVING FROM LYING ON BACK TO SITTING ON SIDE OF FLAT BED: A LITTLE

## 2022-10-17 ASSESSMENT — GAIT ASSESSMENTS
DISTANCE (FEET): 50
ASSISTIVE DEVICE: FRONT WHEEL WALKER
GAIT LEVEL OF ASSIST: SUPERVISED

## 2022-10-17 ASSESSMENT — PATIENT HEALTH QUESTIONNAIRE - PHQ9
1. LITTLE INTEREST OR PLEASURE IN DOING THINGS: NOT AT ALL
2. FEELING DOWN, DEPRESSED, IRRITABLE, OR HOPELESS: NOT AT ALL
SUM OF ALL RESPONSES TO PHQ9 QUESTIONS 1 AND 2: 0

## 2022-10-17 NOTE — ANESTHESIA PROCEDURE NOTES
Airway    Date/Time: 10/16/2022 7:56 PM  Performed by: Zaki Borja M.D.  Authorized by: Zaki Borja M.D.     Location:  OR  Urgency:  Elective  Indications for Airway Management:  Anesthesia      Spontaneous Ventilation: absent    Sedation Level:  Deep  Preoxygenated: Yes    Patient Position:  Sniffing  Mask Difficulty Assessment:  0 - not attempted  Final Airway Type:  Endotracheal airway  Final Endotracheal Airway:  ETT  Cuffed: Yes    Technique Used for Successful ETT Placement:  Direct laryngoscopy    Insertion Site:  Oral  Blade Type:  William  Laryngoscope Blade/Videolaryngoscope Blade Size:  3  ETT Size (mm):  7.5  Measured from:  Teeth  ETT to Teeth (cm):  22  Placement Verified by: auscultation and capnometry    Cormack-Lehane Classification:  Grade IIb - view of arytenoids or posterior of glottis only  Number of Attempts at Approach:  1
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
## 2022-10-17 NOTE — PROGRESS NOTES
4 Eyes Skin Assessment Completed by CHARLES Castro and CHARLES Krueger.    Head WDL  Ears WDL  Nose WDL  Mouth WDL  Neck WDL  Breast/Chest WDL  Shoulder Blades Redness and Blanching  Spine WDL  (R) Arm/Elbow/Hand Redness and Blanching  (L) Arm/Elbow/Hand Redness and Blanching  Abdomen WDL  Groin WDL  Scrotum/Coccyx/Buttocks WDL  (R) Leg WDL  (L) Leg Incision to L hip, DIP, CDI.  (R) Heel/Foot/Toe WDL  (L) Heel/Foot/Toe WDL          Devices In Places Pulse Ox, SCD's, Oxy Mask, and Nasal Cannula      Interventions In Place NC W/Ear Foams, Pillows, and Pressure Redistribution Mattress    Possible Skin Injury No    Pictures Uploaded Into Epic N/A  Wound Consult Placed N/A  RN Wound Prevention Protocol Ordered No
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
## 2022-10-17 NOTE — ANESTHESIA TIME REPORT
Anesthesia Start and Stop Event Times     Date Time Event    10/16/2022 1939 Ready for Procedure     1951 Anesthesia Start     2100 Anesthesia Stop        Responsible Staff  10/16/22    Name Role Begin Gary Borja M.D. Anesth 1951 2100        Overtime Reason:  no overtime (within assigned shift)    Comments:

## 2022-10-17 NOTE — ED NOTES
Med rec updated and complete. Allergies reviewed. Pt denies taking medications.        [place] [facility]  [phone]

## 2022-10-17 NOTE — ANESTHESIA PREPROCEDURE EVALUATION
Case: [id number] Date/Time: 10/16/22 1910    Procedure: INSERTION, INTRAMEDULLARY ARIELA, FEMUR, PROXIMAL (Left)    Location: [facility] OR 16 / SURGERY [facility]    Surgeons: Jose Eason M.D.          Relevant Problems   ANESTHESIA (within normal limits)      PULMONARY (within normal limits)      NEURO (within normal limits)      CARDIAC (within normal limits)      GI (within normal limits)       (within normal limits)      ENDO (within normal limits)   [facility], normal C-spine CT, last ate 1200    Physical Exam    Airway   Mallampati: II  TM distance: >3 FB  Neck ROM: full       Cardiovascular - normal exam  Rhythm: regular  Rate: normal  (-) murmur     Dental - normal exam           Pulmonary - normal exam  Breath sounds clear to auscultation     Abdominal    Neurological - normal exam                 Anesthesia Plan    ASA 2- EMERGENT   ASA physical status emergent criteria: compromised vital organ, limb or tissue    Plan - general       Airway plan will be ETT          Induction: intravenous    Postoperative Plan: Postoperative administration of opioids is intended.    Pertinent diagnostic labs and testing reviewed    Informed Consent:    Anesthetic plan and risks discussed with patient.    Use of blood products discussed with: patient whom consented to blood products.

## 2022-10-17 NOTE — PROGRESS NOTES
ORTHO RESPONSE TIME:  I was contacted by Dr. Whitman at 5:24pm requesting a formal orthopaedic consultation.  I responded to the consultation request at 5:30pm.  I physically evaluated the patient at 6:10pm.    Jose Eason M.D.

## 2022-10-17 NOTE — OR SURGEON
Immediate Post OP Note    PreOp Diagnosis: Left displaced IT femur fracture      PostOp Diagnosis: same      Procedure(s):  INSERTION, INTRAMEDULLARY ARIELA, FEMUR, PROXIMAL - Wound Class: Clean    Surgeon(s):  Jose Eason M.D.    Anesthesiologist/Type of Anesthesia:  Anesthesiologist: Zaki Borja M.D./General    Surgical Staff:  Circulator: Sheila Lomas R.N.  Scrub Person: Leopold von C Garcia    Specimens removed if any:  * No specimens in log *    Estimated Blood Loss: 100cc    Findings: see dictation    Complications: none known    PLAN:  --admit postop  --WBAT LLE  --ancef x 2 doses postop  --PT/OT for mobilization ASAP  --lovenox in am, continue SCDs  --fu 2 weeks postop        10/16/2022 8:57 PM Jose Eason M.D.

## 2022-10-17 NOTE — OR NURSING
2058: Patient arrived in PACU, vss.   2145: Oral airway Dc'd  2151: medicated for pain per MAR  2204: Updated patient's mother, Jordyn, of POC.   2209 :Report given to CHARLES Castro  2223: Transport took patient to room T413 on full tank O2
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
## 2022-10-17 NOTE — CARE PLAN
The patient is Stable - Low risk of patient condition declining or worsening    Shift Goals  Clinical Goals: Safety, pain control  Patient Goals: Rest    Progress made toward(s) clinical / shift goals:  pt pain medicated per MAR, bed alarm on, bed locked and in lowest position, call light and belongings within reach.      Problem: Pain - Standard  Goal: Alleviation of pain or a reduction in pain to the patient’s comfort goal  Outcome: Progressing     Problem: Fall Risk  Goal: Patient will remain free from falls  Outcome: Progressing     Problem: Knowledge Deficit - Standard  Goal: Patient and family/care givers will demonstrate understanding of plan of care, disease process/condition, diagnostic tests and medications  Outcome: Progressing

## 2022-10-17 NOTE — PROGRESS NOTES
Received report from PACU RN, Vanessa Pettit. Pt arrived to unit at 2230 via hospital bed. Belongings at bedside. Pt on 5L of oxygen via oxymask.    Assessment complete.  A&O x 4, pt lethargic. Pt educated on how to use call light, call light within reach.  Patient ambulates with x1 assist w/ FWW, WB as tolerated LLE. Bed alarm on.   Patient has 2/10 pain. Pain managed with prescribed medications.  Denies N&V. Tolerating regular diet.  Surgical incision to L hip, DIP, CDI.  - void, - flatus, - BM.  Patient denies SOB.  SCD's on.    Review plan of care with patient. Call light and personal belongings with in reach. Hourly rounding in place. All needs met at this time.

## 2022-10-18 ENCOUNTER — TELEPHONE (OUTPATIENT)
Dept: SCHEDULING | Facility: IMAGING CENTER | Age: 47
End: 2022-10-18

## 2022-10-18 PROCEDURE — 770001 HCHG ROOM/CARE - MED/SURG/GYN PRIV*

## 2022-10-18 PROCEDURE — 700101 HCHG RX REV CODE 250: Performed by: PHYSICIAN ASSISTANT

## 2022-10-18 PROCEDURE — A9270 NON-COVERED ITEM OR SERVICE: HCPCS | Performed by: ORTHOPAEDIC SURGERY

## 2022-10-18 PROCEDURE — 700102 HCHG RX REV CODE 250 W/ 637 OVERRIDE(OP): Performed by: ORTHOPAEDIC SURGERY

## 2022-10-18 PROCEDURE — 700111 HCHG RX REV CODE 636 W/ 250 OVERRIDE (IP): Performed by: ORTHOPAEDIC SURGERY

## 2022-10-18 PROCEDURE — 97535 SELF CARE MNGMENT TRAINING: CPT

## 2022-10-18 PROCEDURE — 97165 OT EVAL LOW COMPLEX 30 MIN: CPT

## 2022-10-18 RX ORDER — LIDOCAINE 50 MG/G
1 PATCH TOPICAL EVERY 24 HOURS
Status: DISCONTINUED | OUTPATIENT
Start: 2022-10-18 | End: 2022-10-19 | Stop reason: HOSPADM

## 2022-10-18 RX ADMIN — SENNOSIDES AND DOCUSATE SODIUM 1 TABLET: 50; 8.6 TABLET ORAL at 19:57

## 2022-10-18 RX ADMIN — KETOROLAC TROMETHAMINE 30 MG: 30 INJECTION, SOLUTION INTRAMUSCULAR at 09:31

## 2022-10-18 RX ADMIN — OXYCODONE HYDROCHLORIDE 10 MG: 10 TABLET ORAL at 11:35

## 2022-10-18 RX ADMIN — ACETAMINOPHEN 1000 MG: 500 TABLET ORAL at 17:03

## 2022-10-18 RX ADMIN — ACETAMINOPHEN 1000 MG: 500 TABLET ORAL at 11:34

## 2022-10-18 RX ADMIN — DOCUSATE SODIUM 100 MG: 100 CAPSULE, LIQUID FILLED ORAL at 17:03

## 2022-10-18 RX ADMIN — KETOROLAC TROMETHAMINE 30 MG: 30 INJECTION, SOLUTION INTRAMUSCULAR at 19:56

## 2022-10-18 RX ADMIN — LIDOCAINE 1 PATCH: 50 PATCH TOPICAL at 17:04

## 2022-10-18 RX ADMIN — KETOROLAC TROMETHAMINE 30 MG: 30 INJECTION, SOLUTION INTRAMUSCULAR at 03:19

## 2022-10-18 RX ADMIN — DOCUSATE SODIUM 100 MG: 100 CAPSULE, LIQUID FILLED ORAL at 04:22

## 2022-10-18 RX ADMIN — ACETAMINOPHEN 1000 MG: 500 TABLET ORAL at 04:22

## 2022-10-18 RX ADMIN — ENOXAPARIN SODIUM 40 MG: 40 INJECTION SUBCUTANEOUS at 17:03

## 2022-10-18 RX ADMIN — KETOROLAC TROMETHAMINE 30 MG: 30 INJECTION, SOLUTION INTRAMUSCULAR at 15:18

## 2022-10-18 ASSESSMENT — COGNITIVE AND FUNCTIONAL STATUS - GENERAL
DAILY ACTIVITIY SCORE: 23
DRESSING REGULAR LOWER BODY CLOTHING: A LITTLE
SUGGESTED CMS G CODE MODIFIER DAILY ACTIVITY: CI

## 2022-10-18 ASSESSMENT — PAIN DESCRIPTION - PAIN TYPE
TYPE: SURGICAL PAIN
TYPE: SURGICAL PAIN
TYPE: ACUTE PAIN;SURGICAL PAIN
TYPE: SURGICAL PAIN

## 2022-10-18 ASSESSMENT — PAIN SCALES - GENERAL: PAIN_LEVEL: 3

## 2022-10-18 ASSESSMENT — ACTIVITIES OF DAILY LIVING (ADL): TOILETING: INDEPENDENT

## 2022-10-18 NOTE — PROGRESS NOTES
Pt has continuous complaints of rib pain causing SOB.  Updated PA.  Imaging reviewed, no new scans appropriate.  Pulmonary hygiene encouraged, pain treated per MAR.

## 2022-10-18 NOTE — CARE PLAN
The patient is Stable - Low risk of patient condition declining or worsening    Shift Goals  Clinical Goals: Pain control  Patient Goals: Rest, pain control  Family Goals: Rest    Progress made toward(s) clinical / shift goals:  pt pain medicated per MAR, pt bed locked and in lowest position, call light and belongings within reach.      Problem: Pain - Standard  Goal: Alleviation of pain or a reduction in pain to the patient’s comfort goal  Outcome: Progressing     Problem: Fall Risk  Goal: Patient will remain free from falls  Outcome: Progressing     Problem: Knowledge Deficit - Standard  Goal: Patient and family/care givers will demonstrate understanding of plan of care, disease process/condition, diagnostic tests and medications  Outcome: Progressing

## 2022-10-18 NOTE — FACE TO FACE
Face to Face Supporting Documentation - Home Health    The encounter with this patient was in whole or in part the primary reason for home health admission.    Date of encounter:   Patient:                    MRN:                       YOB: 2021  Govind Durham  [id number]  1975     Home health to see patient for:  Physical Therapy evaluation and treatment and Occupational therapy evaluation and treatment    Skilled need for:  Surgical Aftercare postop left IT femur fracture    Skilled nursing interventions to include:  Comment: n/a    Homebound status evidenced by:  Needs the assistance of another person in order to leave the home. Leaving home requires a considerable and taxing effort. There is a normal inability to leave the home.    Community Physician to provide follow up care: Pcp Pt States None     Optional Interventions? No      I certify the face to face encounter for this home health care referral meets the CMS requirements and the encounter/clinical assessment with the patient was, in whole, or in part, for the medical condition(s) listed above, which is the primary reason for home health care. Based on my clinical findings: the service(s) are medically necessary, support the need for home health care, and the homebound criteria are met.  I certify that this patient has had a face to face encounter by myself.  Jose Eason M.D. - NPI: [id number]

## 2022-10-18 NOTE — DISCHARGE INSTR - CASE MGT
This RN CM spoke with Alfredo, . Pt has a PCP appt with Dr Moraima Anaya on 10/26/22 , Wed  at 09:25 am at [address] [facility] [address].

## 2022-10-18 NOTE — DISCHARGE PLANNING
Case Management Discharge Planning    Admission Date: 10/16/2022  GMLOS: 3.2  ALOS: 2    6-Clicks ADL Score: 23  6-Clicks Mobility Score: 18      Anticipated Discharge Dispo: Discharge Disposition: Discharged to home/self care (01) with close OP follow up    DME Needed: Yes    DME Ordered: Yes    Action(s) Taken: Updated Provider/Nurse on Discharge Plan    There is an order for Pt for referral for Home Health. Pt has  [facility] Accident Liability Insurance /3rd party Insurance. No Home Health usually  accepts this Insurance. Even Admissions at [facility] states they do not accept this kind of Insurance. Will inform Pt.     Per chart' Pt's FWW was delivered to bedside.    This RN CM spoke with Linh of [facility] that they can potentially accept this Insurance as long as the complete details of the Insurance claim is available. Will talk to Pt .    This RN CM met with Pt at bedside who states that he lives in one story house with his mother. Pt was independent with both ADLS and IADLs prior to this hospitalization. Pt agreed that we send referral to [facility] and to [facility] choice faxed to [employer].     Will obtain Pt's PCP appt as per Pt he has no PCP.     This RN CM spoke with Alfredo, . Pt has a PCP appt with Dr Moraima Anaya on 10/26/22 , Wed  at 09:25 am at [address] [facility] [address].     Escalations Completed: None    Medically Clear: No    Next Steps:   This RN CM to continue to assist Pt with discharge as needed    Barriers to Discharge:   Medical clearance  Misc Accident Liability Insurance  Pending  acceptance    Is the patient up for discharge tomorrow: No    Addendum: 10/19/22     Per CHARLES Grossman, plan is to wait for [facility] to accept Pt but if there is no accepting SNF then Pt can discharge to home per Tomas DEY

## 2022-10-18 NOTE — CARE PLAN
The patient is Stable - Low risk of patient condition declining or worsening    Shift Goals  Clinical Goals: increase mobility  Patient Goals: Rest, pain control  Family Goals: Rest    Progress made toward(s) clinical / shift goals:  pt tolerating short walks around room or to restroom. Declines to ambulate further as he is reluctant to place weight on his surgical leg.  PT/OT on board. Medicating for pain PRN per MAR    Patient is not progressing towards the following goals:

## 2022-10-18 NOTE — PROGRESS NOTES
Received report from previous shift RN  Assessment complete.  A&O x 4. Patient calls appropriately.  Patient ambulates with standby assist w/ FWW. Bed alarm off.   Patient has 8/10 pain. Pain managed with prescribed medications.  Denies N&V. Tolerating regular diet.  Surgical incision on L hip, DIP, CDI.  + void, + flatus, - BM.  Patient denies SOB.  SCD's on.    Review plan of care with patient. Call light and personal belongings with in reach. Hourly rounding in place. All needs met at this time.

## 2022-10-18 NOTE — DISCHARGE PLANNING
Received Choice form at [phone]  Agency/Facility Name: [facility]   Referral sent per Choice form @ [phone]
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
## 2022-10-18 NOTE — ANESTHESIA POSTPROCEDURE EVALUATION
Patient: Govind Durham    Procedure Summary     Date: 10/16/22 Room / Location: [place] [address] / SURGERY [facility]    Anesthesia Start: 1951 Anesthesia Stop: 2100    Procedure: INSERTION, INTRAMEDULLARY ARIELA, FEMUR, PROXIMAL (Left: Hip) Diagnosis: (left displaced intertrochanteric femur fracture )    Surgeons: Jose Eason M.D. Responsible Provider: Zaki Borja M.D.    Anesthesia Type: general ASA Status: 2 - Emergent          Final Anesthesia Type: general  Last vitals  BP   Blood Pressure: 123/85    Temp   36.4 °C (97.6 °F)    Pulse   97   Resp   16    SpO2   99 %      Anesthesia Post Evaluation    Patient location during evaluation: PACU  Patient participation: complete - patient participated  Level of consciousness: awake and alert  Pain score: 3    Airway patency: patent  Anesthetic complications: no  Cardiovascular status: hemodynamically stable  Respiratory status: acceptable  Hydration status: euvolemic    PONV: none          No notable events documented.     Nurse Pain Score: 3 (NPRS)

## 2022-10-19 VITALS
RESPIRATION RATE: 18 BRPM | OXYGEN SATURATION: 94 % | HEART RATE: 108 BPM | SYSTOLIC BLOOD PRESSURE: 155 MMHG | BODY MASS INDEX: 25.11 KG/M2 | WEIGHT: 160 LBS | TEMPERATURE: 98.4 F | DIASTOLIC BLOOD PRESSURE: 95 MMHG | HEIGHT: 67 IN

## 2022-10-19 PROCEDURE — 700111 HCHG RX REV CODE 636 W/ 250 OVERRIDE (IP): Performed by: ORTHOPAEDIC SURGERY

## 2022-10-19 PROCEDURE — A9270 NON-COVERED ITEM OR SERVICE: HCPCS | Performed by: ORTHOPAEDIC SURGERY

## 2022-10-19 PROCEDURE — 700102 HCHG RX REV CODE 250 W/ 637 OVERRIDE(OP): Performed by: ORTHOPAEDIC SURGERY

## 2022-10-19 RX ORDER — OXYCODONE HYDROCHLORIDE AND ACETAMINOPHEN 5; 325 MG/1; MG/1
1 TABLET ORAL EVERY 4 HOURS PRN
Qty: 45 TABLET | Refills: 0 | Status: SHIPPED | OUTPATIENT
Start: 2022-10-19 | End: 2022-10-20 | Stop reason: SDUPTHER

## 2022-10-19 RX ORDER — DOCUSATE SODIUM 100 MG/1
200 CAPSULE, LIQUID FILLED ORAL 2 TIMES DAILY
Qty: 60 CAPSULE | Refills: 0 | Status: SHIPPED | OUTPATIENT
Start: 2022-10-19 | End: 2022-10-20 | Stop reason: SDUPTHER

## 2022-10-19 RX ADMIN — KETOROLAC TROMETHAMINE 30 MG: 30 INJECTION, SOLUTION INTRAMUSCULAR at 02:15

## 2022-10-19 RX ADMIN — ACETAMINOPHEN 1000 MG: 500 TABLET ORAL at 04:35

## 2022-10-19 RX ADMIN — OXYCODONE HYDROCHLORIDE 10 MG: 10 TABLET ORAL at 17:02

## 2022-10-19 RX ADMIN — DOCUSATE SODIUM 100 MG: 100 CAPSULE, LIQUID FILLED ORAL at 04:35

## 2022-10-19 RX ADMIN — ACETAMINOPHEN 1000 MG: 500 TABLET ORAL at 11:50

## 2022-10-19 ASSESSMENT — PAIN DESCRIPTION - PAIN TYPE
TYPE: SURGICAL PAIN

## 2022-10-19 NOTE — PROGRESS NOTES
Received report from previous shift RN  Assessment complete.  A&O x 4. Patient calls appropriately.  Patient ambulates with standby assist w/ FWW. Bed alarm off.   Patient has 6/10 pain. Pain managed with prescribed medications.  Denies N&V. Tolerating regular diet.  Surgical incision on L hip, DIP, CDI.  + void, + flatus, - BM.  Patient denies SOB.  SCD's on.     Review plan of care with patient. Call light and personal belongings with in reach. Hourly rounding in place. All needs met at this time.

## 2022-10-19 NOTE — DISCHARGE PLANNING
Agency[facility]Fac[facility] Name: [facility]   Outcome: DPA left a voicemail regarding referral. DPA waiting on a call back for updates.    0947  Agency/Facility Name: [facility]   Spoke To: Yanelis   Outcome: DPA was notified that referral is currently pending at this time. DPA to call back for updates.    1121  Agency/Facility Name: [facility]  Spoke To: Rosario  Outcome: Per Ros[facility], referral is currently under review. DPA waiting on a call back for updates.    1302  Agency/Facility Name: [facility]   Spoke To: Yanelis   Outcome: Per Yanelis, referral is currently under review. DPA waiting on a call back for updates.    1518  Agency/Facility Name: [facility]    Outcome: Pt is declined due to Medicaid census full.

## 2022-10-19 NOTE — CARE PLAN
The patient is Stable - Low risk of patient condition declining or worsening    Shift Goals  Clinical Goals: Increased mobility, pain control  Patient Goals: Rest  Family Goals: Rest    Progress made toward(s) clinical / shift goals:  pt pain medicated per MAR, pt ambulated to bathroom      Problem: Pain - Standard  Goal: Alleviation of pain or a reduction in pain to the patient’s comfort goal  Outcome: Progressing     Problem: Fall Risk  Goal: Patient will remain free from falls  Outcome: Progressing     Problem: Knowledge Deficit - Standard  Goal: Patient and family/care givers will demonstrate understanding of plan of care, disease process/condition, diagnostic tests and medications  Outcome: Progressing
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
## 2022-10-19 NOTE — PROGRESS NOTES
Case Management unable to set up HH  Per PA, ok to Discharge without HH or PT.  Pt should continue to attempt to set up PT outpatient

## 2022-10-20 RX ORDER — DOCUSATE SODIUM 100 MG/1
200 CAPSULE, LIQUID FILLED ORAL 2 TIMES DAILY
Qty: 60 CAPSULE | Refills: 0 | Status: SHIPPED | OUTPATIENT
Start: 2022-10-20 | End: 2023-12-11

## 2022-10-20 RX ORDER — OXYCODONE HYDROCHLORIDE AND ACETAMINOPHEN 5; 325 MG/1; MG/1
1 TABLET ORAL EVERY 4 HOURS PRN
Qty: 45 TABLET | Refills: 0 | Status: SHIPPED | OUTPATIENT
Start: 2022-10-20 | End: 2022-11-03

## 2022-10-20 NOTE — PROGRESS NOTES
Pt DC'd. IV removed, discharge instructions provided to patient, pt verbalizes understanding. Pt states all questions have been answered. Copy of discharge paperwork provided to pt, signed copy in chart. Pt states all belongings in possession. Pt escorted off unit by CNA without incident.

## 2023-05-23 ENCOUNTER — APPOINTMENT (OUTPATIENT)
Dept: RADIOLOGY | Facility: MEDICAL CENTER | Age: 48
End: 2023-05-23
Attending: EMERGENCY MEDICINE
Payer: MEDICAID

## 2023-05-23 ENCOUNTER — HOSPITAL ENCOUNTER (EMERGENCY)
Facility: MEDICAL CENTER | Age: 48
End: 2023-05-24
Attending: EMERGENCY MEDICINE
Payer: MEDICAID

## 2023-05-23 DIAGNOSIS — N17.9 ACUTE KIDNEY INJURY (HCC): ICD-10-CM

## 2023-05-23 DIAGNOSIS — R00.0 SINUS TACHYCARDIA: ICD-10-CM

## 2023-05-23 DIAGNOSIS — I51.89 RIGHT VENTRICULAR SYSTOLIC DYSFUNCTION WITHOUT HEART FAILURE: ICD-10-CM

## 2023-05-23 DIAGNOSIS — R07.9 CHEST PAIN, UNSPECIFIED TYPE: ICD-10-CM

## 2023-05-23 DIAGNOSIS — R91.1 PULMONARY NODULE: ICD-10-CM

## 2023-05-23 DIAGNOSIS — G54.0 THORACIC OUTLET SYNDROME: ICD-10-CM

## 2023-05-23 DIAGNOSIS — R06.09 EXERTIONAL DYSPNEA: ICD-10-CM

## 2023-05-23 LAB
BASOPHILS # BLD AUTO: 0.4 % (ref 0–1.8)
BASOPHILS # BLD: 0.03 K/UL (ref 0–0.12)
D DIMER PPP IA.FEU-MCNC: 0.57 UG/ML (FEU) (ref 0–0.5)
EKG IMPRESSION: NORMAL
EOSINOPHIL # BLD AUTO: 0.2 K/UL (ref 0–0.51)
EOSINOPHIL NFR BLD: 2.7 % (ref 0–6.9)
ERYTHROCYTE [DISTWIDTH] IN BLOOD BY AUTOMATED COUNT: 49.3 FL (ref 35.9–50)
HCT VFR BLD AUTO: 43.8 % (ref 42–52)
HGB BLD-MCNC: 14.6 G/DL (ref 14–18)
IMM GRANULOCYTES # BLD AUTO: 0.02 K/UL (ref 0–0.11)
IMM GRANULOCYTES NFR BLD AUTO: 0.3 % (ref 0–0.9)
LYMPHOCYTES # BLD AUTO: 2.11 K/UL (ref 1–4.8)
LYMPHOCYTES NFR BLD: 28.5 % (ref 22–41)
MCH RBC QN AUTO: 30.4 PG (ref 27–33)
MCHC RBC AUTO-ENTMCNC: 33.3 G/DL (ref 32.3–36.5)
MCV RBC AUTO: 91.3 FL (ref 81.4–97.8)
MONOCYTES # BLD AUTO: 0.58 K/UL (ref 0–0.85)
MONOCYTES NFR BLD AUTO: 7.8 % (ref 0–13.4)
NEUTROPHILS # BLD AUTO: 4.46 K/UL (ref 1.82–7.42)
NEUTROPHILS NFR BLD: 60.3 % (ref 44–72)
NRBC # BLD AUTO: 0 K/UL
NRBC BLD-RTO: 0 /100 WBC (ref 0–0.2)
PLATELET # BLD AUTO: 216 K/UL (ref 164–446)
PMV BLD AUTO: 10.5 FL (ref 9–12.9)
RBC # BLD AUTO: 4.8 M/UL (ref 4.7–6.1)
WBC # BLD AUTO: 7.4 K/UL (ref 4.8–10.8)

## 2023-05-23 PROCEDURE — 85025 COMPLETE CBC W/AUTO DIFF WBC: CPT

## 2023-05-23 PROCEDURE — 36415 COLL VENOUS BLD VENIPUNCTURE: CPT

## 2023-05-23 PROCEDURE — 71045 X-RAY EXAM CHEST 1 VIEW: CPT

## 2023-05-23 PROCEDURE — 99285 EMERGENCY DEPT VISIT HI MDM: CPT

## 2023-05-23 PROCEDURE — 80053 COMPREHEN METABOLIC PANEL: CPT

## 2023-05-23 PROCEDURE — 93005 ELECTROCARDIOGRAM TRACING: CPT

## 2023-05-23 PROCEDURE — 85379 FIBRIN DEGRADATION QUANT: CPT

## 2023-05-23 PROCEDURE — 93005 ELECTROCARDIOGRAM TRACING: CPT | Performed by: EMERGENCY MEDICINE

## 2023-05-23 PROCEDURE — 84484 ASSAY OF TROPONIN QUANT: CPT

## 2023-05-23 ASSESSMENT — FIBROSIS 4 INDEX: FIB4 SCORE: 1.06

## 2023-05-23 ASSESSMENT — PAIN DESCRIPTION - DESCRIPTORS: DESCRIPTORS: PRESSURE

## 2023-05-24 ENCOUNTER — APPOINTMENT (OUTPATIENT)
Dept: RADIOLOGY | Facility: MEDICAL CENTER | Age: 48
End: 2023-05-24
Attending: EMERGENCY MEDICINE
Payer: MEDICAID

## 2023-05-24 VITALS
DIASTOLIC BLOOD PRESSURE: 95 MMHG | BODY MASS INDEX: 25.11 KG/M2 | RESPIRATION RATE: 16 BRPM | WEIGHT: 160 LBS | SYSTOLIC BLOOD PRESSURE: 142 MMHG | HEIGHT: 67 IN | OXYGEN SATURATION: 95 % | HEART RATE: 86 BPM | TEMPERATURE: 96.9 F

## 2023-05-24 LAB
ALBUMIN SERPL BCP-MCNC: 3.5 G/DL (ref 3.2–4.9)
ALBUMIN/GLOB SERPL: 1.3 G/DL
ALP SERPL-CCNC: 101 U/L (ref 30–99)
ALT SERPL-CCNC: 32 U/L (ref 2–50)
ANION GAP SERPL CALC-SCNC: 11 MMOL/L (ref 7–16)
AST SERPL-CCNC: 30 U/L (ref 12–45)
BILIRUB SERPL-MCNC: 0.4 MG/DL (ref 0.1–1.5)
BUN SERPL-MCNC: 23 MG/DL (ref 8–22)
CALCIUM ALBUM COR SERPL-MCNC: 8.5 MG/DL (ref 8.5–10.5)
CALCIUM SERPL-MCNC: 8.1 MG/DL (ref 8.5–10.5)
CHLORIDE SERPL-SCNC: 107 MMOL/L (ref 96–112)
CO2 SERPL-SCNC: 22 MMOL/L (ref 20–33)
CREAT SERPL-MCNC: 1.41 MG/DL (ref 0.5–1.4)
GFR SERPLBLD CREATININE-BSD FMLA CKD-EPI: 62 ML/MIN/1.73 M 2
GLOBULIN SER CALC-MCNC: 2.7 G/DL (ref 1.9–3.5)
GLUCOSE SERPL-MCNC: 115 MG/DL (ref 65–99)
POTASSIUM SERPL-SCNC: 3.9 MMOL/L (ref 3.6–5.5)
PROT SERPL-MCNC: 6.2 G/DL (ref 6–8.2)
SODIUM SERPL-SCNC: 140 MMOL/L (ref 135–145)
TROPONIN T SERPL-MCNC: 12 NG/L (ref 6–19)
TROPONIN T SERPL-MCNC: 13 NG/L (ref 6–19)

## 2023-05-24 PROCEDURE — 700105 HCHG RX REV CODE 258: Mod: UD | Performed by: EMERGENCY MEDICINE

## 2023-05-24 PROCEDURE — 71275 CT ANGIOGRAPHY CHEST: CPT

## 2023-05-24 PROCEDURE — 700117 HCHG RX CONTRAST REV CODE 255: Mod: UD | Performed by: EMERGENCY MEDICINE

## 2023-05-24 PROCEDURE — 84484 ASSAY OF TROPONIN QUANT: CPT

## 2023-05-24 RX ORDER — SODIUM CHLORIDE 9 MG/ML
1000 INJECTION, SOLUTION INTRAVENOUS ONCE
Status: COMPLETED | OUTPATIENT
Start: 2023-05-24 | End: 2023-05-24

## 2023-05-24 RX ADMIN — IOHEXOL 70 ML: 350 INJECTION, SOLUTION INTRAVENOUS at 01:56

## 2023-05-24 RX ADMIN — SODIUM CHLORIDE 1000 ML: 9 INJECTION, SOLUTION INTRAVENOUS at 01:41
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
## 2023-05-24 NOTE — ED TRIAGE NOTES
Govind Corral  47 y.o.  male  Chief Complaint   Patient presents with    Chest Pain     C/o chest pain / pressure off and on x 1-2 months. Pain/pressure worse with exertion. + SOB. Patient states all symptoms gone at this  time. Anxious in triage

## 2023-05-24 NOTE — DISCHARGE SUMMARY
"  ED Observation Discharge Summary    Patient:Govind Corral  Patient : 1975  Patient MRN: [id number]  Patient PCP: Pcp Pt States None    Admit Date: 2023  Discharge Date and Time: 23 2:36 AM  Discharge Diagnosis: Chest pain  Discharge Attending: Sheila Sanchez D.O.  Discharge Service: ED Observation    ED Course  Govind is a 47 y.o. male who was evaluated at [facility] for evaluation of chest pain.  The patient was initially seen by Dr. Roque.  He evaluated the patient with an EKG and a troponin which were normal.  The D-dimer was slightly elevated and a CTA of the chest was ordered.  The patient was signed out to me pending results of the second troponin, results of the CT and final disposition.    3:09 AM -CT was notable for cardiomegaly and evidence of right heart dysfunction.  Given his concerning story, the plan was made to admit for further evaluation with an echo.  I reevaluated the patient and updated him on his results.  He requested to leave.  He signed out AGAINST MEDICAL ADVICE.  He was alert and oriented x3 and his judgment appeared intact.  I discussed the risks of leaving AGAINST MEDICAL ADVICE including permanent disability and/or death.  He verbalized understanding.  I strongly encouraged him to follow-up and a referral to cardiology was placed.  He understands return to the ED immediately should he have any worsening signs or symptoms.    The patient has decided not to proceed with further recommended testing or treatment to determine the cause of their symptoms. The risks and alternatives to the recommendation were discussed and the patient voiced understanding. The patient appears clinically to have capacity to make this decision. The patient was instructed that they could return to the ER at any time to complete the testing or treatment    Discharge Exam:  BP (!) 142/95   Pulse 86   Temp 36.1 °C (96.9 °F) (Temporal)   Resp 16   Ht 1.702 m (5' 7\")   Wt 72.6 kg (160 lb) "   SpO2 95%   BMI 25.06 kg/m² .    Constitutional: Awake and alert. Nontoxic  HENT:  Grossly normal  Eyes: Grossly normal  Neck: Normal range of motion  Cardiovascular: Normal heart rate   Thorax & Lungs: No respiratory distress  Abdomen: Nontender  Skin:  No pathologic rash.   Extremities: Well perfused  Psychiatric: Affect normal    Labs  Results for orders placed or performed during the hospital encounter of 05/23/23   CBC with Differential   Result Value Ref Range    WBC 7.4 4.8 - 10.8 K/uL    RBC 4.80 4.70 - 6.10 M/uL    Hemoglobin 14.6 14.0 - 18.0 g/dL    Hematocrit 43.8 42.0 - 52.0 %    MCV 91.3 81.4 - 97.8 fL    MCH 30.4 27.0 - 33.0 pg    MCHC 33.3 32.3 - 36.5 g/dL    RDW 49.3 35.9 - 50.0 fL    Platelet Count 216 164 - 446 K/uL    MPV 10.5 9.0 - 12.9 fL    Neutrophils-Polys 60.30 44.00 - 72.00 %    Lymphocytes 28.50 22.00 - 41.00 %    Monocytes 7.80 0.00 - 13.40 %    Eosinophils 2.70 0.00 - 6.90 %    Basophils 0.40 0.00 - 1.80 %    Immature Granulocytes 0.30 0.00 - 0.90 %    Nucleated RBC 0.00 0.00 - 0.20 /100 WBC    Neutrophils (Absolute) 4.46 1.82 - 7.42 K/uL    Lymphs (Absolute) 2.11 1.00 - 4.80 K/uL    Monos (Absolute) 0.58 0.00 - 0.85 K/uL    Eos (Absolute) 0.20 0.00 - 0.51 K/uL    Baso (Absolute) 0.03 0.00 - 0.12 K/uL    Immature Granulocytes (abs) 0.02 0.00 - 0.11 K/uL    NRBC (Absolute) 0.00 K/uL   D-DIMER   Result Value Ref Range    D-Dimer 0.57 (H) 0.00 - 0.50 ug/mL (FEU)   Comp Metabolic Panel   Result Value Ref Range    Sodium 140 135 - 145 mmol/L    Potassium 3.9 3.6 - 5.5 mmol/L    Chloride 107 96 - 112 mmol/L    Co2 22 20 - 33 mmol/L    Anion Gap 11.0 7.0 - 16.0    Glucose 115 (H) 65 - 99 mg/dL    Bun 23 (H) 8 - 22 mg/dL    Creatinine 1.41 (H) 0.50 - 1.40 mg/dL    Calcium 8.1 (L) 8.5 - 10.5 mg/dL    AST(SGOT) 30 12 - 45 U/L    ALT(SGPT) 32 2 - 50 U/L    Alkaline Phosphatase 101 (H) 30 - 99 U/L    Total Bilirubin 0.4 0.1 - 1.5 mg/dL    Albumin 3.5 3.2 - 4.9 g/dL    Total Protein 6.2 6.0 - 8.2  g/dL    Globulin 2.7 1.9 - 3.5 g/dL    A-G Ratio 1.3 g/dL   TROPONIN   Result Value Ref Range    Troponin T 13 6 - 19 ng/L   CORRECTED CALCIUM   Result Value Ref Range    Correct Calcium 8.5 8.5 - 10.5 mg/dL   ESTIMATED GFR   Result Value Ref Range    GFR (CKD-EPI) 62 >60 mL/min/1.73 m 2   TROPONIN   Result Value Ref Range    Troponin T 12 6 - 19 ng/L   EKG (NOW)   Result Value Ref Range    Report       [facility] Emergency Dept.    Test Date:  2023  Pt Name:    SHASHA ESPINAL             Department: ER  MRN:        [id number]                      Room:  Gender:     Male                         Technician: [id number]  :        1975                   Requested By:ER TRIAGE PROTOCOL  Order #:    [id number]                    Reading MD: NISREEN US MD    Measurements  Intervals                                Axis  Rate:       97                           P:          57  VA:         135                          QRS:        89  QRSD:       108                          T:          -52  QT:         367  QTc:        466    Interpretive Statements  Sinus rhythm  Probable left atrial enlargement  RSR' in V1 or V2, right VCD or RVH  Borderline T abnormalities, inferior leads  Compared to ECG 2021 10:33:58  Right ventricular hypertrophy now present  RSR' in V1 or V2 now present  T-wave abnormality now present  Sinus bradycardia no longer present  Danica ctronically Signed On 2023 22:19:11 PDT by NISREEN US MD         Radiology  CT-CTA CHEST PULMONARY ARTERY W/ RECONS   Final Result      1.  No evidence of pulmonary embolism.   2.  Cardiomegaly. Evidence of right heart dysfunction.   3.  Narrowing of the subclavian vein adjacent to the first rib with collateralization. This can be seen with venous thoracic outlet syndrome.   4.  Bilateral pulmonary nodules measuring up to 5 mm.      Low Risk: No routine follow-up      High Risk: Optional CT at 12 months      Comments:  Use most suspicious nodule as guide to management. Follow-up intervals may vary according to size and risk.      Low Risk - Minimal or absent history of smoking and of other known risk factors.      High Risk - History of smoking or of other known risk factors.      Note: These recommendations do not apply to lung cancer screening, patients with immunosuppression, or patients with known primary cancer.      Fleischner Society 2017 Guidelines for Management of Incidentally Detected Pulmonary Nodules in Adults      DX-CHEST-PORTABLE (1 VIEW)   Final Result      1.  No acute cardiac or pulmonary abnormalities are identified.          Medications:   Discharge Medication List as of 5/24/2023  3:14 AM          My final assessment includes chest pain, exertional dyspnea, acute kidney injury, thoracic outlet syndrome, right ventricular systolic dysfunction, pulmonary nodule  Upon Reevaluation, the patient's condition has: not improved; and patient will sign out against medical advice.    Patient discharged from ED Observation status at 3:11 AM (Time) 5/24/23 (Date).     Total time spent on this ED Observation discharge encounter is > 30 Minutes    Electronically signed by: Sheila Sanchez D.O., 5/24/2023 2:36 AM

## 2023-06-09 ENCOUNTER — TELEPHONE (OUTPATIENT)
Dept: CARDIOLOGY | Facility: MEDICAL CENTER | Age: 48
End: 2023-06-09
Payer: MEDICAID

## 2023-06-14 ENCOUNTER — OFFICE VISIT (OUTPATIENT)
Dept: CARDIOLOGY | Facility: MEDICAL CENTER | Age: 48
End: 2023-06-14
Attending: EMERGENCY MEDICINE
Payer: MEDICAID

## 2023-06-14 VITALS
WEIGHT: 160 LBS | DIASTOLIC BLOOD PRESSURE: 80 MMHG | HEIGHT: 67 IN | HEART RATE: 90 BPM | BODY MASS INDEX: 25.11 KG/M2 | RESPIRATION RATE: 16 BRPM | OXYGEN SATURATION: 94 % | SYSTOLIC BLOOD PRESSURE: 102 MMHG

## 2023-06-14 DIAGNOSIS — F15.10 METHAMPHETAMINE ABUSE (HCC): ICD-10-CM

## 2023-06-14 DIAGNOSIS — R07.89 OTHER CHEST PAIN: ICD-10-CM

## 2023-06-14 LAB — EKG IMPRESSION: NORMAL

## 2023-06-14 PROCEDURE — 3074F SYST BP LT 130 MM HG: CPT | Performed by: INTERNAL MEDICINE

## 2023-06-14 PROCEDURE — 93005 ELECTROCARDIOGRAM TRACING: CPT | Performed by: INTERNAL MEDICINE

## 2023-06-14 PROCEDURE — 3079F DIAST BP 80-89 MM HG: CPT | Performed by: INTERNAL MEDICINE

## 2023-06-14 PROCEDURE — 99211 OFF/OP EST MAY X REQ PHY/QHP: CPT | Performed by: INTERNAL MEDICINE

## 2023-06-14 PROCEDURE — 99203 OFFICE O/P NEW LOW 30 MIN: CPT | Performed by: INTERNAL MEDICINE

## 2023-06-14 PROCEDURE — 93010 ELECTROCARDIOGRAM REPORT: CPT | Mod: 26 | Performed by: INTERNAL MEDICINE

## 2023-06-14 ASSESSMENT — FIBROSIS 4 INDEX: FIB4 SCORE: 1.13
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
## 2023-06-14 NOTE — ASSESSMENT & PLAN NOTE
clinically, he is doing fair but is having atypical chest pain symptoms.  I suspect that this is multifactorial in nature and may be associated with his current methamphetamine use coupled to possible underlying anxiety.  At this time he does not have overt EKG findings suggestive of an acute cardiovascular process however I have recommended that he undergo an echocardiogram to assess LV systolic function given his methamphetamine use as well as an exercise stress echocardiogram.  Further recommendations will follow pending evaluation.  I feel that given his EKG I will go not tiredly interpretable does demonstrate some T wave abnormalities and would recommend imaging modality to further discern if this is a cardiac etiology and possible ischemic source.  Patient is amenable to his current course of care and will abstain from further methamphetamine use

## 2023-06-14 NOTE — PATIENT INSTRUCTIONS
Follow up in 3 months with Dr. Flynn  Will check an echocardiogram and stress echocardiogram  No more methamphetamine  Continue current medications  Call with questions

## 2023-06-14 NOTE — ASSESSMENT & PLAN NOTE
Regarding his methamphetamine use he readily admits when asked about current use patterns.  I have instructed him to try and abstain from methamphetamine given the negative cardiovascular effects with sustained exposure.  He is amenable to stopping his methamphetamine use.  Random urine drug screens would be appropriate to help assess with overall compliance.  It would not be unreasonable for his primary care provider to consider possible referral to outpatient drug counseling or community-based resources

## 2023-06-15 ENCOUNTER — APPOINTMENT (OUTPATIENT)
Dept: CARDIOLOGY | Facility: MEDICAL CENTER | Age: 48
End: 2023-06-15
Attending: INTERNAL MEDICINE
Payer: MEDICAID

## 2023-10-19 ENCOUNTER — HOSPITAL ENCOUNTER (OUTPATIENT)
Dept: CARDIOLOGY | Facility: MEDICAL CENTER | Age: 48
End: 2023-10-19
Attending: INTERNAL MEDICINE
Payer: MEDICAID

## 2023-10-19 DIAGNOSIS — R07.89 OTHER CHEST PAIN: ICD-10-CM

## 2023-10-19 PROCEDURE — 93306 TTE W/DOPPLER COMPLETE: CPT

## 2023-10-24 PROCEDURE — 93306 TTE W/DOPPLER COMPLETE: CPT | Mod: 26 | Performed by: INTERNAL MEDICINE

## 2023-11-16 ENCOUNTER — HOSPITAL ENCOUNTER (OUTPATIENT)
Dept: CARDIOLOGY | Facility: MEDICAL CENTER | Age: 48
End: 2023-11-16
Attending: INTERNAL MEDICINE
Payer: MEDICAID

## 2023-11-16 ENCOUNTER — TELEPHONE (OUTPATIENT)
Dept: CARDIOLOGY | Facility: MEDICAL CENTER | Age: 48
End: 2023-11-16
Payer: MEDICAID

## 2023-11-16 DIAGNOSIS — R07.89 OTHER CHEST PAIN: ICD-10-CM

## 2023-11-16 NOTE — TELEPHONE ENCOUNTER
Pt in stress echo lab, given remarkable abnormal echo at baseline will cancel order and exam and have him follow up for further testing.  Please check with Dr Flynn further plan of care and let patient know

## 2023-12-01 ENCOUNTER — TELEPHONE (OUTPATIENT)
Dept: CARDIOLOGY | Facility: MEDICAL CENTER | Age: 48
End: 2023-12-01
Payer: MEDICAID

## 2023-12-01 NOTE — TELEPHONE ENCOUNTER
Unable to LVM for pt to call back and schd f/v with BD. Per BD, okay to double book.  Sent MyChart. /cg 12/01/23

## 2023-12-01 NOTE — TELEPHONE ENCOUNTER
To: Scheduling    Please call patient to schedule follow up appointment with BD. Okay to double book. Patient was due to follow up back in September. Thank you!

## 2023-12-11 ENCOUNTER — HOSPITAL ENCOUNTER (OUTPATIENT)
Facility: MEDICAL CENTER | Age: 48
End: 2023-12-12
Attending: EMERGENCY MEDICINE | Admitting: INTERNAL MEDICINE
Payer: MEDICAID

## 2023-12-11 ENCOUNTER — APPOINTMENT (OUTPATIENT)
Dept: CARDIOLOGY | Facility: MEDICAL CENTER | Age: 48
End: 2023-12-11
Attending: INTERNAL MEDICINE
Payer: MEDICAID

## 2023-12-11 ENCOUNTER — APPOINTMENT (OUTPATIENT)
Dept: RADIOLOGY | Facility: MEDICAL CENTER | Age: 48
End: 2023-12-11
Payer: MEDICAID

## 2023-12-11 DIAGNOSIS — R94.31 ABNORMAL EKG: ICD-10-CM

## 2023-12-11 DIAGNOSIS — R07.9 CHEST PAIN, UNSPECIFIED TYPE: ICD-10-CM

## 2023-12-11 PROBLEM — D72.829 LEUKOCYTOSIS: Status: ACTIVE | Noted: 2023-12-11

## 2023-12-11 PROBLEM — E16.2 HYPOGLYCEMIA: Status: ACTIVE | Noted: 2023-12-11

## 2023-12-11 PROBLEM — I50.42 CHRONIC COMBINED SYSTOLIC AND DIASTOLIC CONGESTIVE HEART FAILURE (HCC): Status: ACTIVE | Noted: 2023-12-11

## 2023-12-11 LAB
ALBUMIN SERPL BCP-MCNC: 4.6 G/DL (ref 3.2–4.9)
ALBUMIN/GLOB SERPL: 1.4 G/DL
ALP SERPL-CCNC: 86 U/L (ref 30–99)
ALT SERPL-CCNC: 34 U/L (ref 2–50)
ANION GAP SERPL CALC-SCNC: 12 MMOL/L (ref 7–16)
APPEARANCE UR: CLEAR
AST SERPL-CCNC: 33 U/L (ref 12–45)
BASOPHILS # BLD AUTO: 0.4 % (ref 0–1.8)
BASOPHILS # BLD: 0.05 K/UL (ref 0–0.12)
BILIRUB SERPL-MCNC: 1.1 MG/DL (ref 0.1–1.5)
BILIRUB UR QL STRIP.AUTO: NEGATIVE
BUN SERPL-MCNC: 15 MG/DL (ref 8–22)
CALCIUM ALBUM COR SERPL-MCNC: 9.3 MG/DL (ref 8.5–10.5)
CALCIUM SERPL-MCNC: 9.8 MG/DL (ref 8.5–10.5)
CHLORIDE SERPL-SCNC: 100 MMOL/L (ref 96–112)
CO2 SERPL-SCNC: 25 MMOL/L (ref 20–33)
COLOR UR: ABNORMAL
CREAT SERPL-MCNC: 1.36 MG/DL (ref 0.5–1.4)
D DIMER PPP IA.FEU-MCNC: <0.27 UG/ML (FEU) (ref 0–0.5)
EKG IMPRESSION: NORMAL
EKG IMPRESSION: NORMAL
EOSINOPHIL # BLD AUTO: 0.1 K/UL (ref 0–0.51)
EOSINOPHIL NFR BLD: 0.8 % (ref 0–6.9)
ERYTHROCYTE [DISTWIDTH] IN BLOOD BY AUTOMATED COUNT: 44.4 FL (ref 35.9–50)
GFR SERPLBLD CREATININE-BSD FMLA CKD-EPI: 65 ML/MIN/1.73 M 2
GLOBULIN SER CALC-MCNC: 3.3 G/DL (ref 1.9–3.5)
GLUCOSE SERPL-MCNC: 58 MG/DL (ref 65–99)
GLUCOSE UR STRIP.AUTO-MCNC: NEGATIVE MG/DL
HCT VFR BLD AUTO: 47.1 % (ref 42–52)
HGB BLD-MCNC: 15.9 G/DL (ref 14–18)
IMM GRANULOCYTES # BLD AUTO: 0.05 K/UL (ref 0–0.11)
IMM GRANULOCYTES NFR BLD AUTO: 0.4 % (ref 0–0.9)
KETONES UR STRIP.AUTO-MCNC: NEGATIVE MG/DL
LEUKOCYTE ESTERASE UR QL STRIP.AUTO: NEGATIVE
LYMPHOCYTES # BLD AUTO: 2.27 K/UL (ref 1–4.8)
LYMPHOCYTES NFR BLD: 17.3 % (ref 22–41)
MCH RBC QN AUTO: 30.8 PG (ref 27–33)
MCHC RBC AUTO-ENTMCNC: 33.8 G/DL (ref 32.3–36.5)
MCV RBC AUTO: 91.1 FL (ref 81.4–97.8)
MICRO URNS: ABNORMAL
MONOCYTES # BLD AUTO: 1.05 K/UL (ref 0–0.85)
MONOCYTES NFR BLD AUTO: 8 % (ref 0–13.4)
NEUTROPHILS # BLD AUTO: 9.62 K/UL (ref 1.82–7.42)
NEUTROPHILS NFR BLD: 73.1 % (ref 44–72)
NITRITE UR QL STRIP.AUTO: NEGATIVE
NRBC # BLD AUTO: 0 K/UL
NRBC BLD-RTO: 0 /100 WBC (ref 0–0.2)
PH UR STRIP.AUTO: 5.5 [PH] (ref 5–8)
PLATELET # BLD AUTO: 232 K/UL (ref 164–446)
PMV BLD AUTO: 10.9 FL (ref 9–12.9)
POTASSIUM SERPL-SCNC: 4.3 MMOL/L (ref 3.6–5.5)
PROT SERPL-MCNC: 7.9 G/DL (ref 6–8.2)
PROT UR QL STRIP: NEGATIVE MG/DL
RBC # BLD AUTO: 5.17 M/UL (ref 4.7–6.1)
RBC UR QL AUTO: NEGATIVE
SODIUM SERPL-SCNC: 137 MMOL/L (ref 135–145)
SP GR UR STRIP.AUTO: 1.01
TROPONIN T SERPL-MCNC: 11 NG/L (ref 6–19)
TROPONIN T SERPL-MCNC: 13 NG/L (ref 6–19)
TROPONIN T SERPL-MCNC: 15 NG/L (ref 6–19)
UROBILINOGEN UR STRIP.AUTO-MCNC: 0.2 MG/DL
WBC # BLD AUTO: 13.1 K/UL (ref 4.8–10.8)

## 2023-12-11 PROCEDURE — 80053 COMPREHEN METABOLIC PANEL: CPT

## 2023-12-11 PROCEDURE — 80307 DRUG TEST PRSMV CHEM ANLYZR: CPT

## 2023-12-11 PROCEDURE — G0378 HOSPITAL OBSERVATION PER HR: HCPCS

## 2023-12-11 PROCEDURE — 84484 ASSAY OF TROPONIN QUANT: CPT

## 2023-12-11 PROCEDURE — 700105 HCHG RX REV CODE 258: Mod: UD | Performed by: NURSE PRACTITIONER

## 2023-12-11 PROCEDURE — 81003 URINALYSIS AUTO W/O SCOPE: CPT

## 2023-12-11 PROCEDURE — 99285 EMERGENCY DEPT VISIT HI MDM: CPT

## 2023-12-11 PROCEDURE — 85025 COMPLETE CBC W/AUTO DIFF WBC: CPT

## 2023-12-11 PROCEDURE — 71045 X-RAY EXAM CHEST 1 VIEW: CPT

## 2023-12-11 PROCEDURE — 93005 ELECTROCARDIOGRAM TRACING: CPT | Performed by: EMERGENCY MEDICINE

## 2023-12-11 PROCEDURE — 93005 ELECTROCARDIOGRAM TRACING: CPT

## 2023-12-11 PROCEDURE — 99222 1ST HOSP IP/OBS MODERATE 55: CPT | Mod: AI | Performed by: NURSE PRACTITIONER

## 2023-12-11 PROCEDURE — 36415 COLL VENOUS BLD VENIPUNCTURE: CPT

## 2023-12-11 PROCEDURE — 85379 FIBRIN DEGRADATION QUANT: CPT

## 2023-12-11 RX ORDER — HEPARIN SODIUM 5000 [USP'U]/ML
5000 INJECTION, SOLUTION INTRAVENOUS; SUBCUTANEOUS EVERY 8 HOURS
Status: DISCONTINUED | OUTPATIENT
Start: 2023-12-12 | End: 2023-12-12 | Stop reason: HOSPADM

## 2023-12-11 RX ORDER — POLYETHYLENE GLYCOL 3350 17 G/17G
1 POWDER, FOR SOLUTION ORAL
Status: DISCONTINUED | OUTPATIENT
Start: 2023-12-11 | End: 2023-12-12 | Stop reason: HOSPADM

## 2023-12-11 RX ORDER — ASPIRIN 81 MG/1
81 TABLET ORAL DAILY
Status: DISCONTINUED | OUTPATIENT
Start: 2023-12-12 | End: 2023-12-12 | Stop reason: HOSPADM

## 2023-12-11 RX ORDER — AMOXICILLIN 250 MG
2 CAPSULE ORAL 2 TIMES DAILY
Status: DISCONTINUED | OUTPATIENT
Start: 2023-12-11 | End: 2023-12-12 | Stop reason: HOSPADM

## 2023-12-11 RX ORDER — BISACODYL 10 MG
10 SUPPOSITORY, RECTAL RECTAL
Status: DISCONTINUED | OUTPATIENT
Start: 2023-12-11 | End: 2023-12-12 | Stop reason: HOSPADM

## 2023-12-11 RX ORDER — ACETAMINOPHEN 325 MG/1
650 TABLET ORAL EVERY 6 HOURS PRN
Status: DISCONTINUED | OUTPATIENT
Start: 2023-12-11 | End: 2023-12-12 | Stop reason: HOSPADM

## 2023-12-11 RX ORDER — SODIUM CHLORIDE 9 MG/ML
INJECTION, SOLUTION INTRAVENOUS CONTINUOUS
Status: ACTIVE | OUTPATIENT
Start: 2023-12-11 | End: 2023-12-11

## 2023-12-11 RX ADMIN — SODIUM CHLORIDE: 9 INJECTION, SOLUTION INTRAVENOUS at 18:16

## 2023-12-11 ASSESSMENT — ENCOUNTER SYMPTOMS
VOMITING: 0
DIZZINESS: 0
FEVER: 0
COUGH: 0
FLANK PAIN: 0
TINGLING: 0
CHILLS: 0
DIARRHEA: 0
ABDOMINAL PAIN: 0
HEARTBURN: 0
NAUSEA: 0
ORTHOPNEA: 0
WEIGHT LOSS: 0
BACK PAIN: 0
CONSTIPATION: 0
NERVOUS/ANXIOUS: 1
SINUS PAIN: 0
SHORTNESS OF BREATH: 0
WHEEZING: 0
SORE THROAT: 0
DIAPHORESIS: 0
MYALGIAS: 0
EYES NEGATIVE: 1
DEPRESSION: 0
PALPITATIONS: 0
HEADACHES: 0

## 2023-12-11 ASSESSMENT — LIFESTYLE VARIABLES
HAVE YOU EVER FELT YOU SHOULD CUT DOWN ON YOUR DRINKING: NO
TOTAL SCORE: 0
DOES PATIENT WANT TO STOP DRINKING: NO
HAVE PEOPLE ANNOYED YOU BY CRITICIZING YOUR DRINKING: NO
AVERAGE NUMBER OF DAYS PER WEEK YOU HAVE A DRINK CONTAINING ALCOHOL: 2
EVER HAD A DRINK FIRST THING IN THE MORNING TO STEADY YOUR NERVES TO GET RID OF A HANGOVER: NO
CONSUMPTION TOTAL: NEGATIVE
EVER FELT BAD OR GUILTY ABOUT YOUR DRINKING: NO
TOTAL SCORE: 0
TOTAL SCORE: 0
ALCOHOL_USE: YES
HOW MANY TIMES IN THE PAST YEAR HAVE YOU HAD 5 OR MORE DRINKS IN A DAY: 0
ON A TYPICAL DAY WHEN YOU DRINK ALCOHOL HOW MANY DRINKS DO YOU HAVE: 3

## 2023-12-11 ASSESSMENT — FIBROSIS 4 INDEX
FIB4 SCORE: 1.12
FIB4 SCORE: 1.13
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
## 2023-12-11 NOTE — ED TRIAGE NOTES
"Govind Corral  47 y.o. male  Chief Complaint   Patient presents with    Chest Pain     Reports 5/10 \"sharp\" left sided chest pain while pt was digging    Palpitations     With CP       Pt amb to triage with steady gait for above complaint. Pt reports he began to experience this pain while digging a hole.  Denies N/V or diaphoresis. Endorses SOB with exertion.   Pt is alert and oriented, speaking in full sentences, follows commands and responds appropriately to questions. Not in any apparent distress. Respirations are even and unlabored.  Pt placed in lobby. Pt educated on triage process. Pt encouraged to alert staff for any changes.    "

## 2023-12-11 NOTE — ED NOTES
Repeat EKG completed. Pt awaiting ERP. Labs collected while pt in [facility]

## 2023-12-12 ENCOUNTER — APPOINTMENT (OUTPATIENT)
Dept: RADIOLOGY | Facility: MEDICAL CENTER | Age: 48
End: 2023-12-12
Attending: NURSE PRACTITIONER
Payer: MEDICAID

## 2023-12-12 VITALS
OXYGEN SATURATION: 94 % | BODY MASS INDEX: 25.99 KG/M2 | HEART RATE: 66 BPM | RESPIRATION RATE: 18 BRPM | HEIGHT: 67 IN | DIASTOLIC BLOOD PRESSURE: 79 MMHG | WEIGHT: 165.57 LBS | SYSTOLIC BLOOD PRESSURE: 123 MMHG | TEMPERATURE: 98.7 F

## 2023-12-12 LAB
AMPHET UR QL SCN: NEGATIVE
ANION GAP SERPL CALC-SCNC: 10 MMOL/L (ref 7–16)
BARBITURATES UR QL SCN: NEGATIVE
BASOPHILS # BLD AUTO: 0.3 % (ref 0–1.8)
BASOPHILS # BLD: 0.02 K/UL (ref 0–0.12)
BENZODIAZ UR QL SCN: NEGATIVE
BUN SERPL-MCNC: 18 MG/DL (ref 8–22)
BZE UR QL SCN: NEGATIVE
CALCIUM SERPL-MCNC: 8.6 MG/DL (ref 8.5–10.5)
CANNABINOIDS UR QL SCN: NEGATIVE
CHLORIDE SERPL-SCNC: 105 MMOL/L (ref 96–112)
CHOLEST SERPL-MCNC: 191 MG/DL (ref 100–199)
CO2 SERPL-SCNC: 25 MMOL/L (ref 20–33)
CREAT SERPL-MCNC: 1.15 MG/DL (ref 0.5–1.4)
EOSINOPHIL # BLD AUTO: 0.23 K/UL (ref 0–0.51)
EOSINOPHIL NFR BLD: 3.2 % (ref 0–6.9)
ERYTHROCYTE [DISTWIDTH] IN BLOOD BY AUTOMATED COUNT: 46 FL (ref 35.9–50)
EST. AVERAGE GLUCOSE BLD GHB EST-MCNC: 131 MG/DL
FENTANYL UR QL: NEGATIVE
GFR SERPLBLD CREATININE-BSD FMLA CKD-EPI: 79 ML/MIN/1.73 M 2
GLUCOSE SERPL-MCNC: 98 MG/DL (ref 65–99)
HBA1C MFR BLD: 6.2 % (ref 4–5.6)
HCT VFR BLD AUTO: 45.5 % (ref 42–52)
HDLC SERPL-MCNC: 45 MG/DL
HGB BLD-MCNC: 15.3 G/DL (ref 14–18)
IMM GRANULOCYTES # BLD AUTO: 0.03 K/UL (ref 0–0.11)
IMM GRANULOCYTES NFR BLD AUTO: 0.4 % (ref 0–0.9)
LDLC SERPL CALC-MCNC: 137 MG/DL
LYMPHOCYTES # BLD AUTO: 2.49 K/UL (ref 1–4.8)
LYMPHOCYTES NFR BLD: 34.7 % (ref 22–41)
MAGNESIUM SERPL-MCNC: 2.1 MG/DL (ref 1.5–2.5)
MCH RBC QN AUTO: 30.5 PG (ref 27–33)
MCHC RBC AUTO-ENTMCNC: 33.6 G/DL (ref 32.3–36.5)
MCV RBC AUTO: 90.8 FL (ref 81.4–97.8)
METHADONE UR QL SCN: NEGATIVE
MONOCYTES # BLD AUTO: 0.67 K/UL (ref 0–0.85)
MONOCYTES NFR BLD AUTO: 9.3 % (ref 0–13.4)
NEUTROPHILS # BLD AUTO: 3.74 K/UL (ref 1.82–7.42)
NEUTROPHILS NFR BLD: 52.1 % (ref 44–72)
NRBC # BLD AUTO: 0 K/UL
NRBC BLD-RTO: 0 /100 WBC (ref 0–0.2)
NT-PROBNP SERPL IA-MCNC: 230 PG/ML (ref 0–125)
OPIATES UR QL SCN: NEGATIVE
OXYCODONE UR QL SCN: NEGATIVE
PCP UR QL SCN: NEGATIVE
PLATELET # BLD AUTO: 207 K/UL (ref 164–446)
PMV BLD AUTO: 10.9 FL (ref 9–12.9)
POTASSIUM SERPL-SCNC: 4.7 MMOL/L (ref 3.6–5.5)
PROPOXYPH UR QL SCN: NEGATIVE
RBC # BLD AUTO: 5.01 M/UL (ref 4.7–6.1)
SODIUM SERPL-SCNC: 140 MMOL/L (ref 135–145)
TRIGL SERPL-MCNC: 45 MG/DL (ref 0–149)
TSH SERPL DL<=0.005 MIU/L-ACNC: 1.45 UIU/ML (ref 0.38–5.33)
WBC # BLD AUTO: 7.2 K/UL (ref 4.8–10.8)

## 2023-12-12 PROCEDURE — 96372 THER/PROPH/DIAG INJ SC/IM: CPT

## 2023-12-12 PROCEDURE — 700102 HCHG RX REV CODE 250 W/ 637 OVERRIDE(OP): Mod: UD | Performed by: NURSE PRACTITIONER

## 2023-12-12 PROCEDURE — 84443 ASSAY THYROID STIM HORMONE: CPT

## 2023-12-12 PROCEDURE — 99239 HOSP IP/OBS DSCHRG MGMT >30: CPT | Performed by: STUDENT IN AN ORGANIZED HEALTH CARE EDUCATION/TRAINING PROGRAM

## 2023-12-12 PROCEDURE — 83036 HEMOGLOBIN GLYCOSYLATED A1C: CPT

## 2023-12-12 PROCEDURE — 700111 HCHG RX REV CODE 636 W/ 250 OVERRIDE (IP): Mod: UD

## 2023-12-12 PROCEDURE — 700111 HCHG RX REV CODE 636 W/ 250 OVERRIDE (IP): Mod: UD | Performed by: NURSE PRACTITIONER

## 2023-12-12 PROCEDURE — 83880 ASSAY OF NATRIURETIC PEPTIDE: CPT

## 2023-12-12 PROCEDURE — 83735 ASSAY OF MAGNESIUM: CPT

## 2023-12-12 PROCEDURE — 85025 COMPLETE CBC W/AUTO DIFF WBC: CPT

## 2023-12-12 PROCEDURE — G0378 HOSPITAL OBSERVATION PER HR: HCPCS

## 2023-12-12 PROCEDURE — A9270 NON-COVERED ITEM OR SERVICE: HCPCS | Mod: UD | Performed by: NURSE PRACTITIONER

## 2023-12-12 PROCEDURE — 78452 HT MUSCLE IMAGE SPECT MULT: CPT

## 2023-12-12 PROCEDURE — 80048 BASIC METABOLIC PNL TOTAL CA: CPT

## 2023-12-12 PROCEDURE — 36415 COLL VENOUS BLD VENIPUNCTURE: CPT

## 2023-12-12 PROCEDURE — 80061 LIPID PANEL: CPT

## 2023-12-12 RX ORDER — REGADENOSON 0.08 MG/ML
0.4 INJECTION, SOLUTION INTRAVENOUS ONCE
Status: COMPLETED | OUTPATIENT
Start: 2023-12-12 | End: 2023-12-12

## 2023-12-12 RX ORDER — REGADENOSON 0.08 MG/ML
INJECTION, SOLUTION INTRAVENOUS
Status: COMPLETED
Start: 2023-12-12 | End: 2023-12-12

## 2023-12-12 RX ORDER — AMINOPHYLLINE 25 MG/ML
100 INJECTION, SOLUTION INTRAVENOUS
Status: DISCONTINUED | OUTPATIENT
Start: 2023-12-12 | End: 2023-12-12 | Stop reason: HOSPADM

## 2023-12-12 RX ADMIN — HEPARIN SODIUM 5000 UNITS: 5000 INJECTION, SOLUTION INTRAVENOUS; SUBCUTANEOUS at 05:09

## 2023-12-12 RX ADMIN — ASPIRIN 81 MG: 81 TABLET, COATED ORAL at 05:09

## 2023-12-12 RX ADMIN — REGADENOSON 0.4 MG: 0.08 INJECTION, SOLUTION INTRAVENOUS at 13:37
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
## 2023-12-12 NOTE — ASSESSMENT & PLAN NOTE
As noted on Echo obtained by Dr Flynn with whom patient is to follow up with on Dec 13 (missed appointment today due to ED eval)  - Will hold off on repeat echo  - stress test as noted above  - further tx pending results of above  - follow up with cardiologist as scheduled

## 2023-12-12 NOTE — ASSESSMENT & PLAN NOTE
Reports intermittent sharp stabbing chest pain while taking ditches today. History of abnormal echo. EKG with no acute ST-T changes. Troponin x 1 is normal. Follows with cardiologist Dr. Flynn.  Hold off on echo today due to recent abnormal echo  -Telemetry monitor  -Serial troponin  -TSH, lipid panel, A1c  -Stress test in the morning

## 2023-12-12 NOTE — DISCHARGE SUMMARY
"Discharge Summary    CHIEF COMPLAINT ON ADMISSION  Chief Complaint   Patient presents with    Chest Pain     Reports 5/10 \"sharp\" left sided chest pain while pt was digging    Palpitations     With CP       Reason for Admission  Chest Pain     Admission Date  12/11/2023    CODE STATUS  Full Code    HPI & HOSPITAL COURSE  Per HPI by Enma Haley's dated 12/11/2023:  \"Govind Corral is a 47 y.o. male with a pmh of prior traumatic brain injury, methamphetamine abuse, abnormal echocardiogram, and anxiety who presented to [facility] ED on 12/11/2023 with complaints of chest pain. The patient experienced sharp stabbing left-sided chest pain today that lasted only few seconds and resolves once he rested. 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 He says it felt like his chest was pounding in his chest for a couple beats and at the same time he fel stabbing \"heart pain\" that was associated with shortness of breath. All of this resolved when he rested. He has had no return of symptoms. He denies radiating pain, dizziness, lightheadedness, nausea or diaphoresis during these episodes. The patient says the pain is different than the chest pain he has had in the past.     Patient saw his cardiologist, Dr. Flynn, in June, 2023 for atypical chest pain symptoms associated with exertion and stress, which were determined to be multifactorial in nature and possibly associated with the patient's chronic methamphetamine use coupled with underlying anxiety. EKG at that time did demonstrate some T wave abnormalities and patient was scheduled for echocardiogram, which was obtained 10/19/2023.    Results of the echo revealed mildly reduced left ventricular systolic function with EF 45 - 50%. There were also signs of RV pressure and volume overload, and indeterminate diastolic function.  He had severely dilated right ventricle with reduced right ventricular systolic function. He had severe " "tricuspid regurg with severely elevated RV systolic pressure of 78 mmHg and moderate pulmonic insufficiency.   He was scheduled to see his cardiologist today at 3 pm, but was in the ED for evaluation. He has rescheduled this appointment for Dec 13.     CXR obtained in the ED is negative. Troponin x 2 is normal.Ddimer is normal. Labs are significant for WBC 13.1, Glucose 58, otherwise CBC and CMP are unremarkable. EKG shows SR, 71 bpm, no acute ST-T changes.\"    Pt was admitted for chest pain observation.    Hospital course under my care 12/12: afebrile and vitals wnl. Exam at bedside was grossly unremarkable. Labs and imagings findings discussed with patient. Serial trop flat. Pt underwent NST - No evidence of significant jeopardize to viable myocardium. Small sized nonreversible defect in the inferoseptal segments which could indicate remote infarct. At this point, ACS is unlikely but the need for GDMT discussed. Pt has not been on any cardiac meds. Pt claims he has an appointment with Dr. Flynn tomorrow in clinic. At this point, I feel it is reasonable to have Pt started on GDMT meds as outpatient in cardiology clinic.      Therefore, he is discharged in fair and stable condition to home with close outpatient follow-up.    The patient recovered much more quickly than anticipated on admission.    Discharge Date  12/12/23    FOLLOW UP ITEMS POST DISCHARGE  N/A    DISCHARGE DIAGNOSES  Principal Problem:    Acute chest pain (POA: Yes)  Active Problems:    Methamphetamine abuse (HCC) (POA: Yes)    Hypoglycemia (POA: Yes)    Leukocytosis (POA: Yes)    Chronic combined systolic and diastolic congestive heart failure (HCC) (POA: Yes)  Resolved Problems:    * No resolved hospital problems. *      FOLLOW UP  Future Appointments   Date Time Provider Department Center   12/13/2023  2:40 PM DYLON Aguilar None     No follow-up provider specified.    MEDICATIONS ON DISCHARGE     Medication List        CONTINUE " taking these medications        Instructions   aspirin  MG Tbec  Commonly known as: Ecotrin   Take 1 Tab by mouth every day.  Dose: 325 mg              Allergies  Allergies   Allergen Reactions    Amoxicillin Rash          Penicillins Rash     Rash to arms and legs per pts mom. Hives. Tolerated cefazolin in 2022.       DIET  Cardiac    ACTIVITY  As tolerated.  Weight bearing as tolerated    CONSULTATIONS  N/A    PROCEDURES  N/A    LABORATORY  Lab Results   Component Value Date    SODIUM 140 12/12/2023    POTASSIUM 4.7 12/12/2023    CHLORIDE 105 12/12/2023    CO2 25 12/12/2023    GLUCOSE 98 12/12/2023    BUN 18 12/12/2023    CREATININE 1.15 12/12/2023        Lab Results   Component Value Date    WBC 7.2 12/12/2023    HEMOGLOBIN 15.3 12/12/2023    HEMATOCRIT 45.5 12/12/2023    PLATELETCT 207 12/12/2023        Total time of the discharge process exceeds 36 minutes.

## 2023-12-12 NOTE — ED NOTES
Med rec completed per patient.  Allergies reviewed with patient. Per pt, pcn allergy occurred as child.   Home antibiotics in past 30 days: none   Anticoagulants/antiplatelets in past 14 days: none.  Preferred pharmacy: [facility] Pharmacy Intern

## 2023-12-12 NOTE — CARE PLAN
The patient is Stable - Low risk of patient condition declining or worsening    Shift Goals  Clinical Goals: stress test  Patient Goals: discharge  Family Goals: not at bedside    Progress made toward(s) clinical / shift goals:    Problem: Knowledge Deficit - Standard  Goal: Patient and family/care givers will demonstrate understanding of plan of care, disease process/condition, diagnostic tests and medications  Description: Target End Date:  12/12/2023    Document in Patient Education    1.  Patient and family/caregiver oriented to unit, equipment, visitation policy and means for communicating concern  2.  Complete/review Learning Assessment  3.  Assess knowledge level of disease process/condition, treatment plan, diagnostic tests and medications  4.  Explain disease process/condition, treatment plan, diagnostic tests and medications  Outcome: Progressing

## 2023-12-12 NOTE — ED NOTES
PIV obtained, repeat trop sent to lab. Pt on all monitoring, call light within reach, no other needs at this time.

## 2023-12-13 NOTE — PROGRESS NOTES
4 Eyes Skin Assessment Completed by CHARLES Mathews and CHARLES Crooks.    Head WDL  Ears WDL  Nose WDL  Mouth WDL  Neck WDL  Breast/Chest Scar  Shoulder Blades WDL  Spine WDL  (R) Arm/Elbow/Hand WDL  (L) Arm/Elbow/Hand WDL  Abdomen WDL  Groin WDL  Scrotum/Coccyx/Buttocks WDL  (R) Leg WDL  (L) Leg WDL  (R) Heel/Foot/Toe WDL  (L) Heel/Foot/Toe WDL          Devices In Places Tele Box and Pulse Ox      Interventions In Place N/A    Possible Skin Injury No    Pictures Uploaded Into Epic N/A  Wound Consult Placed N/A  RN Wound Prevention Protocol Ordered No   
Discharge instructions reviewed and signed, all questions answered, PIV removed, no new medications.  
NP at bedside.   
Pt arrived to CDU with transport. Pt educated on room, call light and unit  
Pt in bed a&ox4,no c/o pain now,pt not in distress,v/s wnl,for stress test,npo status maintained,on tele with SR/SB,poc explained,call light at bedside.  
Tele Monitor Summary (per monitor tech)  Rhythm: sinus reg  HR: 70 - 89  0.14 / 0.09 / 0.42        
Neuro

## 2024-01-10 ENCOUNTER — TELEPHONE (OUTPATIENT)
Dept: PHARMACY | Facility: MEDICAL CENTER | Age: 49
End: 2024-01-10

## 2024-01-10 ENCOUNTER — APPOINTMENT (OUTPATIENT)
Dept: CARDIOLOGY | Facility: MEDICAL CENTER | Age: 49
End: 2024-01-10
Attending: INTERNAL MEDICINE
Payer: MEDICAID

## 2024-01-10 ENCOUNTER — OFFICE VISIT (OUTPATIENT)
Dept: CARDIOLOGY | Facility: MEDICAL CENTER | Age: 49
End: 2024-01-10
Attending: NURSE PRACTITIONER
Payer: MEDICAID

## 2024-01-10 VITALS
OXYGEN SATURATION: 93 % | RESPIRATION RATE: 18 BRPM | SYSTOLIC BLOOD PRESSURE: 122 MMHG | WEIGHT: 158 LBS | HEIGHT: 67 IN | BODY MASS INDEX: 24.8 KG/M2 | DIASTOLIC BLOOD PRESSURE: 80 MMHG | HEART RATE: 86 BPM

## 2024-01-10 DIAGNOSIS — I50.42 CHRONIC COMBINED SYSTOLIC AND DIASTOLIC CONGESTIVE HEART FAILURE (HCC): ICD-10-CM

## 2024-01-10 DIAGNOSIS — S06.9X2A TRAUMATIC BRAIN INJURY, WITH LOSS OF CONSCIOUSNESS OF 31 MINUTES TO 59 MINUTES, INITIAL ENCOUNTER (HCC): ICD-10-CM

## 2024-01-10 DIAGNOSIS — I50.20 HFREF (HEART FAILURE WITH REDUCED EJECTION FRACTION) (HCC): ICD-10-CM

## 2024-01-10 DIAGNOSIS — F15.10 METHAMPHETAMINE ABUSE (HCC): ICD-10-CM

## 2024-01-10 DIAGNOSIS — R07.89 OTHER CHEST PAIN: ICD-10-CM

## 2024-01-10 PROCEDURE — 99211 OFF/OP EST MAY X REQ PHY/QHP: CPT | Performed by: NURSE PRACTITIONER

## 2024-01-10 PROCEDURE — 2023F DILAT RTA XM W/O RTNOPTHY: CPT | Performed by: NURSE PRACTITIONER

## 2024-01-10 PROCEDURE — 99214 OFFICE O/P EST MOD 30 MIN: CPT | Performed by: NURSE PRACTITIONER

## 2024-01-10 PROCEDURE — 3074F SYST BP LT 130 MM HG: CPT | Performed by: NURSE PRACTITIONER

## 2024-01-10 PROCEDURE — 3079F DIAST BP 80-89 MM HG: CPT | Performed by: NURSE PRACTITIONER

## 2024-01-10 RX ORDER — SPIRONOLACTONE 25 MG/1
12.5 TABLET ORAL DAILY
Qty: 30 TABLET | Refills: 3 | Status: SHIPPED | OUTPATIENT
Start: 2024-01-10 | End: 2024-01-29 | Stop reason: SDUPTHER

## 2024-01-10 RX ORDER — SACUBITRIL AND VALSARTAN 24; 26 MG/1; MG/1
1 TABLET, FILM COATED ORAL 2 TIMES DAILY
Qty: 180 TABLET | Refills: 3 | Status: SHIPPED | OUTPATIENT
Start: 2024-01-10

## 2024-01-10 RX ORDER — CARVEDILOL 3.12 MG/1
3.12 TABLET ORAL 2 TIMES DAILY WITH MEALS
Qty: 60 TABLET | Refills: 11 | Status: SHIPPED | OUTPATIENT
Start: 2024-01-10

## 2024-01-10 ASSESSMENT — ENCOUNTER SYMPTOMS
ORTHOPNEA: 0
SHORTNESS OF BREATH: 0
PALPITATIONS: 0
LOSS OF CONSCIOUSNESS: 0
DIZZINESS: 0

## 2024-01-10 ASSESSMENT — FIBROSIS 4 INDEX: FIB4 SCORE: 1.28

## 2024-01-10 NOTE — TELEPHONE ENCOUNTER
Received ERX in Cardiology [facility], ran test claim and rejected for Prior Authorization is required. Prior Authorization for Entresto 24-26mg tabs (Quantity: 180, Days: 90) has been submitted via Cover My Meds: Key ([id number])    Insurance: [place] [employer]    Will follow up in 24-48 business hours.
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
## 2024-01-11 NOTE — TELEPHONE ENCOUNTER
Prior Authorization for Entresto 24-26mg tablets   has been approved for a quantity of 180 , day supply 90    [facility]    Prior Authorization Reference#-[id number]    Dates in effect, from 01/10/24 through 01/09/25    Co-pay: $0/60T/30D  Max of 30 days     Pharmacy and phone number   [facility] [id number], [place] [address]  Phone: [phone]  Fax: [phone]    Is patient eligible to fill with [facility]? Yes    Next Steps:  Routing Approval to Liaisons and sending to outreach.

## 2024-01-22 NOTE — PROGRESS NOTES
"CHF Pharmacotherapy Visit    Informed written consent was given on: 1/23/24    Govind Corral is here for CHF    HPI  Pertinent Interval History since last visit:   This is pt's initial visit    Most recent EF:  40-50% 10/24/23    Potential Barriers to Care:  Adherence: {ACTIONS; DENIES/REPORTS:84183} missed doses***  Side effects: {NONE:10962}  Affordability: ***    Current CHF Medications - including dose:   Entresto or ACE/ARB: Entresto 24-26mg bid  Beta blocker: carvedilol 3.125 mg bid   Diuretic: none  Aldosterone antagonist: spironolactone 12.5mg daily  SGLT2i: none    Home BP and HR:  ***      Lifestyle:  Change in weight: {weight change stable/free txt:457051}  Exercise habits: {EXERCISE PATTERN:21}   Diet: {DIET TYPES:82051}    DATA REVIEW  There were no vitals filed for this visit.    Lab Results   Component Value Date/Time    SODIUM 140 12/12/2023 06:58 AM    POTASSIUM 4.7 12/12/2023 06:58 AM    CHLORIDE 105 12/12/2023 06:58 AM    CO2 25 12/12/2023 06:58 AM    GLUCOSE 98 12/12/2023 06:58 AM    BUN 18 12/12/2023 06:58 AM    CREATININE 1.15 12/12/2023 06:58 AM     Lab Results   Component Value Date/Time    ALKPHOSPHAT 86 12/11/2023 02:29 PM    ASTSGOT 33 12/11/2023 02:29 PM    ALTSGPT 34 12/11/2023 02:29 PM    TBILIRUBIN 1.1 12/11/2023 02:29 PM    INR 1.07 10/16/2022 04:46 PM    ALBUMIN 4.6 12/11/2023 02:29 PM      No components found for: \"MICROALBUMINCREATRATIOURINE\"    Renal function:  Calculated creatinine clearance: *** ml/min   eGFR: *** mL/min/1.73 m2    Other:  Immunization History   Administered Date(s) Administered    Tdap Vaccine 10/16/2022     Up to date on pneumococcal vaccine? ***    Recent Imaging Studies:    Recent imaging studies, including ECHO, were available in EMR and reviewed with patient at today's visit    ASSESSMENT AND PLAN  CHF  Educated pt on basic pathophysiology and symptom management, as well as the importance of GDMT. ***delete if this is a f/u visit***  ***    CHF " medications (changes are bolded)  Entresto or ACE/ARB: Entresto 24-26mg bid  Beta blocker: carvedilol 3.125 mg bid   Diuretic: none  Aldosterone antagonist: spironolactone 12.5mg daily  SGLT2i: none    2. Lifestyle   Recommendations From Today's Visit:   ***    Blood Work Ordered At Today's visit:   {NONE:65362}    Follow-Up:   {FOLLOWUP:89720}    Luz Lazo, PharmD    CC:  Pcp Pt States None  Mia Espinal, A.P.R*    ***Medications reconciled  ***Flow sheets updated

## 2024-01-23 ENCOUNTER — APPOINTMENT (OUTPATIENT)
Dept: CARDIOLOGY | Facility: MEDICAL CENTER | Age: 49
End: 2024-01-23
Attending: NURSE PRACTITIONER
Payer: MEDICAID

## 2024-01-25 NOTE — PROGRESS NOTES
"CHF Pharmacotherapy Visit    Informed written consent was given on: 1/29/24    Govind Corral is here for CHF    HPI  Pertinent Interval History since last visit:   Initial visit    Most recent EF:  45-50% 10/19/2023    Potential Barriers to Care:  Adherence: reports missed doses x1   Side effects: nausea and heartburn shortly after taking morning medications  Affordability: covered by medicaid    Current CHF Medications - including dose:   Entresto or ACE/ARB: Entresto 24-26mg bid  Beta blocker: carvedilol 3.125mg bid   Diuretic: none  Aldosterone antagonist: spironolactone 12.5mg daily  SGLT2i: none    Home BP and HR:  Unable to check      Lifestyle:  Change in weight: Stable  Exercise habits:  exercising with weights    Diet: appetite is lower    DATA REVIEW  Vitals:    01/29/24 1026   BP: 116/63   Pulse: 60       Lab Results   Component Value Date/Time    SODIUM 140 12/12/2023 06:58 AM    POTASSIUM 4.7 12/12/2023 06:58 AM    CHLORIDE 105 12/12/2023 06:58 AM    CO2 25 12/12/2023 06:58 AM    GLUCOSE 98 12/12/2023 06:58 AM    BUN 18 12/12/2023 06:58 AM    CREATININE 1.15 12/12/2023 06:58 AM     Lab Results   Component Value Date/Time    ALKPHOSPHAT 86 12/11/2023 02:29 PM    ASTSGOT 33 12/11/2023 02:29 PM    ALTSGPT 34 12/11/2023 02:29 PM    TBILIRUBIN 1.1 12/11/2023 02:29 PM    INR 1.07 10/16/2022 04:46 PM    ALBUMIN 4.6 12/11/2023 02:29 PM      No components found for: \"MICROALBUMINCREATRATIOURINE\"    Renal function:  Calculated creatinine clearance: 82 ml/min   eGFR: 79 mL/min/1.73 m2    Other:  Immunization History   Administered Date(s) Administered    Tdap Vaccine 10/16/2022     Up to date on pneumococcal vaccine? Pt to get    Recent Imaging Studies:    Recent imaging studies, including ECHO, were available in EMR and reviewed with patient at today's visit    ASSESSMENT AND PLAN  CHF  Educated pt on basic pathophysiology and symptom management, as well as the importance of GDMT.   Pt continues to " abstain from meth  He reports GERD after taking his morning medications, especially if he takes it before eating food  Counseled to take AM medications after food to mitigate GERD    CHF medications (changes are bolded)  Entresto or ACE/ARB: Entresto 24-26mg bid  Beta blocker: carvedilol 3.125mg bid   Diuretic: none  Aldosterone antagonist: increase spironolactone to 25mg daily  SGLT2i: none    2. Lifestyle   Recommendations From Today's Visit:   Continue to limit fluid intake to 2L/day and Na+ intake to 2gm/day    Blood Work Ordered At Today's visit:   BMP    Follow-Up:   2 weeks w/ J Kyle APRN  4 weeks w/ pharmacy    Luz Lazo, PharmD    CC:  Pcp Pt States None  Mia Espinal, SVETLANA.P.R*    Medications reconciled  Flow sheets updated

## 2024-01-29 ENCOUNTER — NON-PROVIDER VISIT (OUTPATIENT)
Dept: CARDIOLOGY | Facility: MEDICAL CENTER | Age: 49
End: 2024-01-29
Attending: NURSE PRACTITIONER
Payer: MEDICAID

## 2024-01-29 VITALS
SYSTOLIC BLOOD PRESSURE: 116 MMHG | BODY MASS INDEX: 25.22 KG/M2 | HEART RATE: 60 BPM | WEIGHT: 161 LBS | DIASTOLIC BLOOD PRESSURE: 63 MMHG

## 2024-01-29 DIAGNOSIS — I50.20 HFREF (HEART FAILURE WITH REDUCED EJECTION FRACTION) (HCC): ICD-10-CM

## 2024-01-29 PROCEDURE — 99213 OFFICE O/P EST LOW 20 MIN: CPT

## 2024-01-29 RX ORDER — SPIRONOLACTONE 25 MG/1
25 TABLET ORAL DAILY
Qty: 90 TABLET | Refills: 3 | Status: SHIPPED | OUTPATIENT
Start: 2024-01-29

## 2024-01-29 ASSESSMENT — FIBROSIS 4 INDEX: FIB4 SCORE: 1.28
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
## 2024-06-17 ENCOUNTER — APPOINTMENT (OUTPATIENT)
Dept: RADIOLOGY | Facility: MEDICAL CENTER | Age: 49
End: 2024-06-17
Attending: STUDENT IN AN ORGANIZED HEALTH CARE EDUCATION/TRAINING PROGRAM
Payer: MEDICAID

## 2024-06-17 ENCOUNTER — HOSPITAL ENCOUNTER (EMERGENCY)
Facility: MEDICAL CENTER | Age: 49
End: 2024-06-18
Attending: STUDENT IN AN ORGANIZED HEALTH CARE EDUCATION/TRAINING PROGRAM
Payer: MEDICAID
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
## 2024-06-17 DIAGNOSIS — S50.861A INSECT BITE OF RIGHT FOREARM, INITIAL ENCOUNTER: ICD-10-CM

## 2024-06-17 LAB
ALBUMIN SERPL BCP-MCNC: 3.5 G/DL (ref 3.2–4.9)
ALBUMIN/GLOB SERPL: 0.9 G/DL
ALP SERPL-CCNC: 121 U/L (ref 30–99)
ALT SERPL-CCNC: 26 U/L (ref 2–50)
ANION GAP SERPL CALC-SCNC: 15 MMOL/L (ref 7–16)
AST SERPL-CCNC: 37 U/L (ref 12–45)
BASOPHILS # BLD AUTO: 0.2 % (ref 0–1.8)
BASOPHILS # BLD: 0.04 K/UL (ref 0–0.12)
BILIRUB SERPL-MCNC: 0.5 MG/DL (ref 0.1–1.5)
BUN SERPL-MCNC: 17 MG/DL (ref 8–22)
CALCIUM ALBUM COR SERPL-MCNC: 9.4 MG/DL (ref 8.5–10.5)
CALCIUM SERPL-MCNC: 9 MG/DL (ref 8.5–10.5)
CHLORIDE SERPL-SCNC: 102 MMOL/L (ref 96–112)
CO2 SERPL-SCNC: 22 MMOL/L (ref 20–33)
CREAT SERPL-MCNC: 1.25 MG/DL (ref 0.5–1.4)
EOSINOPHIL # BLD AUTO: 0.07 K/UL (ref 0–0.51)
EOSINOPHIL NFR BLD: 0.4 % (ref 0–6.9)
ERYTHROCYTE [DISTWIDTH] IN BLOOD BY AUTOMATED COUNT: 46.1 FL (ref 35.9–50)
GFR SERPLBLD CREATININE-BSD FMLA CKD-EPI: 71 ML/MIN/1.73 M 2
GLOBULIN SER CALC-MCNC: 3.7 G/DL (ref 1.9–3.5)
GLUCOSE SERPL-MCNC: 128 MG/DL (ref 65–99)
HCT VFR BLD AUTO: 48 % (ref 42–52)
HGB BLD-MCNC: 16.2 G/DL (ref 14–18)
IMM GRANULOCYTES # BLD AUTO: 0.09 K/UL (ref 0–0.11)
IMM GRANULOCYTES NFR BLD AUTO: 0.6 % (ref 0–0.9)
LACTATE SERPL-SCNC: 2 MMOL/L (ref 0.5–2)
LYMPHOCYTES # BLD AUTO: 1.86 K/UL (ref 1–4.8)
LYMPHOCYTES NFR BLD: 11.4 % (ref 22–41)
MCH RBC QN AUTO: 30.7 PG (ref 27–33)
MCHC RBC AUTO-ENTMCNC: 33.8 G/DL (ref 32.3–36.5)
MCV RBC AUTO: 91.1 FL (ref 81.4–97.8)
MONOCYTES # BLD AUTO: 1.01 K/UL (ref 0–0.85)
MONOCYTES NFR BLD AUTO: 6.2 % (ref 0–13.4)
NEUTROPHILS # BLD AUTO: 13.29 K/UL (ref 1.82–7.42)
NEUTROPHILS NFR BLD: 81.2 % (ref 44–72)
NRBC # BLD AUTO: 0 K/UL
NRBC BLD-RTO: 0 /100 WBC (ref 0–0.2)
PLATELET # BLD AUTO: 259 K/UL (ref 164–446)
PMV BLD AUTO: 10.3 FL (ref 9–12.9)
POTASSIUM SERPL-SCNC: 4.8 MMOL/L (ref 3.6–5.5)
PROT SERPL-MCNC: 7.2 G/DL (ref 6–8.2)
RBC # BLD AUTO: 5.27 M/UL (ref 4.7–6.1)
SODIUM SERPL-SCNC: 139 MMOL/L (ref 135–145)
WBC # BLD AUTO: 16.4 K/UL (ref 4.8–10.8)

## 2024-06-17 PROCEDURE — 36415 COLL VENOUS BLD VENIPUNCTURE: CPT

## 2024-06-17 PROCEDURE — 83605 ASSAY OF LACTIC ACID: CPT

## 2024-06-17 PROCEDURE — 85025 COMPLETE CBC W/AUTO DIFF WBC: CPT

## 2024-06-17 PROCEDURE — 80053 COMPREHEN METABOLIC PANEL: CPT

## 2024-06-17 PROCEDURE — 99284 EMERGENCY DEPT VISIT MOD MDM: CPT

## 2024-06-17 PROCEDURE — 87040 BLOOD CULTURE FOR BACTERIA: CPT | Mod: 91

## 2024-06-17 PROCEDURE — 71045 X-RAY EXAM CHEST 1 VIEW: CPT

## 2024-06-17 RX ORDER — IBUPROFEN 200 MG
400 TABLET ORAL ONCE
Status: COMPLETED | OUTPATIENT
Start: 2024-06-18 | End: 2024-06-18

## 2024-06-17 RX ORDER — SODIUM CHLORIDE 9 MG/ML
INJECTION, SOLUTION INTRAVENOUS ONCE
Status: COMPLETED | OUTPATIENT
Start: 2024-06-18 | End: 2024-06-18

## 2024-06-17 RX ORDER — SULFAMETHOXAZOLE AND TRIMETHOPRIM 800; 160 MG/1; MG/1
1 TABLET ORAL ONCE
Status: COMPLETED | OUTPATIENT
Start: 2024-06-18 | End: 2024-06-18

## 2024-06-17 RX ORDER — ACETAMINOPHEN 500 MG
1000 TABLET ORAL ONCE
Status: COMPLETED | OUTPATIENT
Start: 2024-06-18 | End: 2024-06-18

## 2024-06-17 ASSESSMENT — FIBROSIS 4 INDEX: FIB4 SCORE: 1.28

## 2024-06-18 ENCOUNTER — PHARMACY VISIT (OUTPATIENT)
Dept: PHARMACY | Facility: MEDICAL CENTER | Age: 49
End: 2024-06-18
Payer: COMMERCIAL

## 2024-06-18 VITALS
DIASTOLIC BLOOD PRESSURE: 77 MMHG | WEIGHT: 157.41 LBS | SYSTOLIC BLOOD PRESSURE: 127 MMHG | RESPIRATION RATE: 17 BRPM | TEMPERATURE: 98.7 F | HEART RATE: 94 BPM | BODY MASS INDEX: 24.71 KG/M2 | OXYGEN SATURATION: 90 % | HEIGHT: 67 IN

## 2024-06-18 LAB — LACTATE SERPL-SCNC: 0.9 MMOL/L (ref 0.5–2)

## 2024-06-18 PROCEDURE — 700105 HCHG RX REV CODE 258: Mod: UD | Performed by: STUDENT IN AN ORGANIZED HEALTH CARE EDUCATION/TRAINING PROGRAM

## 2024-06-18 PROCEDURE — 700102 HCHG RX REV CODE 250 W/ 637 OVERRIDE(OP): Mod: UD | Performed by: STUDENT IN AN ORGANIZED HEALTH CARE EDUCATION/TRAINING PROGRAM

## 2024-06-18 PROCEDURE — RXMED WILLOW AMBULATORY MEDICATION CHARGE: Performed by: STUDENT IN AN ORGANIZED HEALTH CARE EDUCATION/TRAINING PROGRAM

## 2024-06-18 PROCEDURE — 83605 ASSAY OF LACTIC ACID: CPT

## 2024-06-18 PROCEDURE — A9270 NON-COVERED ITEM OR SERVICE: HCPCS | Mod: UD | Performed by: STUDENT IN AN ORGANIZED HEALTH CARE EDUCATION/TRAINING PROGRAM

## 2024-06-18 RX ORDER — SULFAMETHOXAZOLE AND TRIMETHOPRIM 800; 160 MG/1; MG/1
1 TABLET ORAL 2 TIMES DAILY
Qty: 14 TABLET | Refills: 0 | Status: ACTIVE | OUTPATIENT
Start: 2024-06-18

## 2024-06-18 RX ADMIN — IBUPROFEN 400 MG: 200 TABLET, FILM COATED ORAL at 00:02

## 2024-06-18 RX ADMIN — ACETAMINOPHEN 1000 MG: 500 TABLET, FILM COATED ORAL at 00:02

## 2024-06-18 RX ADMIN — SULFAMETHOXAZOLE AND TRIMETHOPRIM 1 TABLET: 800; 160 TABLET ORAL at 00:02

## 2024-06-18 RX ADMIN — SODIUM CHLORIDE: 9 INJECTION, SOLUTION INTRAVENOUS at 00:01

## 2024-06-18 ASSESSMENT — PAIN DESCRIPTION - PAIN TYPE: TYPE: ACUTE PAIN
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
## 2024-06-18 NOTE — ED TRIAGE NOTES
Chief Complaint   Patient presents with    Bug Bite     Pt states he was bit by a brown recluse a week ago and the pain has worsened since. Denies seeing the spider but states he's been bit before. Small dark scab to right forearm, no obvious streaking. -n/v. Reports chills and fevers at home.       Endorses meth use on 6/15.     Patient ambulatory to triage for above complaint. Patient A&Ox4, GCS 15, patient speaking in full sentences. Equal and unlabored respirations. Patient educated on triage process and encouraged to notify staff if condition worsens. Appropriate protocols ordered. Patient returned to the lobby in stable condition.

## 2024-06-18 NOTE — ED NOTES
Vital signs reassessment obtained  Respirations even and unlabored, patient informed of room status  Apologized for wait times and thanked for patience

## 2024-06-18 NOTE — ED NOTES
Bedside report received from off going RN/tech: Tete, assumed care of patient.  POC discussed with patient. Call light within reach, all needs addressed at this time.       Fall risk interventions in place: Not Applicable (all applicable per [facility] Fall risk assessment)   Continuous monitoring: Pulse Ox or Blood Pressure  IVF/IV medications: Infusion per MAR (List Med(s)) LR  Oxygen: Room Air  Bedside sitter: Not Applicable   Isolation: Not Applicable

## 2024-06-18 NOTE — ED NOTES
Patient to room from lobby with steady gait. connected to monitor. Agree with triage note. Charted for ERP. Call light within reach.

## 2024-06-23 LAB
BACTERIA BLD CULT: NORMAL
BACTERIA BLD CULT: NORMAL
SIGNIFICANT IND 70042: NORMAL
SIGNIFICANT IND 70042: NORMAL
SITE SITE: NORMAL
SITE SITE: NORMAL
SOURCE SOURCE: NORMAL
SOURCE SOURCE: NORMAL

## 2024-08-27 ENCOUNTER — DOCUMENTATION (OUTPATIENT)
Dept: CARDIOLOGY | Facility: MEDICAL CENTER | Age: 49
End: 2024-08-27
Payer: MEDICAID

## 2024-08-27 NOTE — PROGRESS NOTES
I attempted to call this patient to report.  Unfortunately, I was not able to speak with them or leave a voice message; therefore, we will need to call back at a later time.

## 2024-10-14 ENCOUNTER — HOSPITAL ENCOUNTER (EMERGENCY)
Facility: MEDICAL CENTER | Age: 49
End: 2024-10-14
Attending: EMERGENCY MEDICINE

## 2024-10-14 ENCOUNTER — APPOINTMENT (OUTPATIENT)
Dept: RADIOLOGY | Facility: MEDICAL CENTER | Age: 49
End: 2024-10-14
Attending: EMERGENCY MEDICINE

## 2024-10-14 VITALS
OXYGEN SATURATION: 97 % | BODY MASS INDEX: 25.11 KG/M2 | HEIGHT: 67 IN | RESPIRATION RATE: 18 BRPM | DIASTOLIC BLOOD PRESSURE: 104 MMHG | SYSTOLIC BLOOD PRESSURE: 150 MMHG | HEART RATE: 81 BPM | TEMPERATURE: 97.8 F | WEIGHT: 160 LBS

## 2024-10-14 DIAGNOSIS — B34.9 ACUTE VIRAL SYNDROME: ICD-10-CM

## 2024-10-14 DIAGNOSIS — J06.9 UPPER RESPIRATORY TRACT INFECTION, UNSPECIFIED TYPE: ICD-10-CM

## 2024-10-14 LAB
ALBUMIN SERPL BCP-MCNC: 3.6 G/DL (ref 3.2–4.9)
ALBUMIN/GLOB SERPL: 1.1 G/DL
ALP SERPL-CCNC: 95 U/L (ref 30–99)
ALT SERPL-CCNC: 21 U/L (ref 2–50)
ANION GAP SERPL CALC-SCNC: 9 MMOL/L (ref 7–16)
AST SERPL-CCNC: 23 U/L (ref 12–45)
BASOPHILS # BLD AUTO: 0.5 % (ref 0–1.8)
BASOPHILS # BLD: 0.05 K/UL (ref 0–0.12)
BILIRUB SERPL-MCNC: 1 MG/DL (ref 0.1–1.5)
BUN SERPL-MCNC: 14 MG/DL (ref 8–22)
CALCIUM ALBUM COR SERPL-MCNC: 9.3 MG/DL (ref 8.5–10.5)
CALCIUM SERPL-MCNC: 9 MG/DL (ref 8.5–10.5)
CHLORIDE SERPL-SCNC: 106 MMOL/L (ref 96–112)
CO2 SERPL-SCNC: 26 MMOL/L (ref 20–33)
CREAT SERPL-MCNC: 1.08 MG/DL (ref 0.5–1.4)
EKG IMPRESSION: NORMAL
EOSINOPHIL # BLD AUTO: 0.21 K/UL (ref 0–0.51)
EOSINOPHIL NFR BLD: 2.2 % (ref 0–6.9)
ERYTHROCYTE [DISTWIDTH] IN BLOOD BY AUTOMATED COUNT: 50.6 FL (ref 35.9–50)
FLUAV RNA SPEC QL NAA+PROBE: NEGATIVE
FLUBV RNA SPEC QL NAA+PROBE: NEGATIVE
GFR SERPLBLD CREATININE-BSD FMLA CKD-EPI: 85 ML/MIN/1.73 M 2
GLOBULIN SER CALC-MCNC: 3.4 G/DL (ref 1.9–3.5)
GLUCOSE SERPL-MCNC: 87 MG/DL (ref 65–99)
HCT VFR BLD AUTO: 49.6 % (ref 42–52)
HGB BLD-MCNC: 15.9 G/DL (ref 14–18)
IMM GRANULOCYTES # BLD AUTO: 0.04 K/UL (ref 0–0.11)
IMM GRANULOCYTES NFR BLD AUTO: 0.4 % (ref 0–0.9)
LYMPHOCYTES # BLD AUTO: 1.98 K/UL (ref 1–4.8)
LYMPHOCYTES NFR BLD: 20.5 % (ref 22–41)
MCH RBC QN AUTO: 30.2 PG (ref 27–33)
MCHC RBC AUTO-ENTMCNC: 32.1 G/DL (ref 32.3–36.5)
MCV RBC AUTO: 94.1 FL (ref 81.4–97.8)
MONOCYTES # BLD AUTO: 0.72 K/UL (ref 0–0.85)
MONOCYTES NFR BLD AUTO: 7.4 % (ref 0–13.4)
NEUTROPHILS # BLD AUTO: 6.68 K/UL (ref 1.82–7.42)
NEUTROPHILS NFR BLD: 69 % (ref 44–72)
NRBC # BLD AUTO: 0 K/UL
NRBC BLD-RTO: 0 /100 WBC (ref 0–0.2)
NT-PROBNP SERPL IA-MCNC: 1691 PG/ML (ref 0–125)
PLATELET # BLD AUTO: 250 K/UL (ref 164–446)
PMV BLD AUTO: 10.2 FL (ref 9–12.9)
POTASSIUM SERPL-SCNC: 4.1 MMOL/L (ref 3.6–5.5)
PROT SERPL-MCNC: 7 G/DL (ref 6–8.2)
RBC # BLD AUTO: 5.27 M/UL (ref 4.7–6.1)
RSV RNA SPEC QL NAA+PROBE: NEGATIVE
SARS-COV-2 RNA RESP QL NAA+PROBE: NOTDETECTED
SODIUM SERPL-SCNC: 141 MMOL/L (ref 135–145)
TROPONIN T SERPL-MCNC: 10 NG/L (ref 6–19)
WBC # BLD AUTO: 9.7 K/UL (ref 4.8–10.8)

## 2024-10-14 PROCEDURE — 80053 COMPREHEN METABOLIC PANEL: CPT

## 2024-10-14 PROCEDURE — 93005 ELECTROCARDIOGRAM TRACING: CPT

## 2024-10-14 PROCEDURE — 83880 ASSAY OF NATRIURETIC PEPTIDE: CPT

## 2024-10-14 PROCEDURE — 84484 ASSAY OF TROPONIN QUANT: CPT

## 2024-10-14 PROCEDURE — 71045 X-RAY EXAM CHEST 1 VIEW: CPT

## 2024-10-14 PROCEDURE — 0241U HCHG SARS-COV-2 COVID-19 NFCT DS RESP RNA 4 TRGT ED POC: CPT

## 2024-10-14 PROCEDURE — 36415 COLL VENOUS BLD VENIPUNCTURE: CPT

## 2024-10-14 PROCEDURE — 93005 ELECTROCARDIOGRAM TRACING: CPT | Performed by: EMERGENCY MEDICINE

## 2024-10-14 PROCEDURE — 99284 EMERGENCY DEPT VISIT MOD MDM: CPT

## 2024-10-14 PROCEDURE — 85025 COMPLETE CBC W/AUTO DIFF WBC: CPT

## 2024-10-14 ASSESSMENT — FIBROSIS 4 INDEX: FIB4 SCORE: 1.32

## 2024-10-14 ASSESSMENT — PAIN DESCRIPTION - PAIN TYPE: TYPE: ACUTE PAIN

## 2025-01-09 ENCOUNTER — TELEPHONE (OUTPATIENT)
Dept: PHARMACY | Facility: MEDICAL CENTER | Age: 50
End: 2025-01-09
Payer: MEDICAID

## 2025-01-09 NOTE — TELEPHONE ENCOUNTER
PriorPrior Authorization for Entresto Tablet 24-26 MG  has been denied for a quantity of 60 , day supply 30    Prior authorization was denied per the following: Please see attached denial letter.    Prior Authorization denial reference number: [id number]  Insurance: [facility] Medicaid ([employer])      Next Steps: Plan has denied, drug criteria not met. Will message liaison and add denial letter to Epic.

## 2025-06-10 ENCOUNTER — HOSPITAL ENCOUNTER (EMERGENCY)
Facility: MEDICAL CENTER | Age: 50
End: 2025-06-10
Payer: MEDICAID

## 2025-06-10 VITALS
BODY MASS INDEX: 25.47 KG/M2 | DIASTOLIC BLOOD PRESSURE: 80 MMHG | SYSTOLIC BLOOD PRESSURE: 145 MMHG | HEIGHT: 67 IN | RESPIRATION RATE: 18 BRPM | HEART RATE: 85 BPM | WEIGHT: 162.26 LBS | TEMPERATURE: 97.8 F | OXYGEN SATURATION: 96 %

## 2025-06-10 LAB
ALBUMIN SERPL BCP-MCNC: 3.6 G/DL (ref 3.2–4.9)
ALBUMIN/GLOB SERPL: 1.2 G/DL
ALP SERPL-CCNC: 99 U/L (ref 30–99)
ALT SERPL-CCNC: 34 U/L (ref 2–50)
ANION GAP SERPL CALC-SCNC: 10 MMOL/L (ref 7–16)
AST SERPL-CCNC: 36 U/L (ref 12–45)
BASOPHILS # BLD AUTO: 0.5 % (ref 0–1.8)
BASOPHILS # BLD: 0.04 K/UL (ref 0–0.12)
BILIRUB SERPL-MCNC: 0.6 MG/DL (ref 0.1–1.5)
BUN SERPL-MCNC: 16 MG/DL (ref 8–22)
CALCIUM ALBUM COR SERPL-MCNC: 8.6 MG/DL (ref 8.5–10.5)
CALCIUM SERPL-MCNC: 8.3 MG/DL (ref 8.5–10.5)
CHLORIDE SERPL-SCNC: 107 MMOL/L (ref 96–112)
CO2 SERPL-SCNC: 22 MMOL/L (ref 20–33)
CREAT SERPL-MCNC: 1.45 MG/DL (ref 0.5–1.4)
EKG IMPRESSION: NORMAL
EOSINOPHIL # BLD AUTO: 0.17 K/UL (ref 0–0.51)
EOSINOPHIL NFR BLD: 2.3 % (ref 0–6.9)
ERYTHROCYTE [DISTWIDTH] IN BLOOD BY AUTOMATED COUNT: 53.2 FL (ref 35.9–50)
FLUAV RNA SPEC QL NAA+PROBE: NEGATIVE
FLUBV RNA SPEC QL NAA+PROBE: NEGATIVE
GFR SERPLBLD CREATININE-BSD FMLA CKD-EPI: 59 ML/MIN/1.73 M 2
GLOBULIN SER CALC-MCNC: 3 G/DL (ref 1.9–3.5)
GLUCOSE SERPL-MCNC: 93 MG/DL (ref 65–99)
HCT VFR BLD AUTO: 45 % (ref 42–52)
HGB BLD-MCNC: 14.7 G/DL (ref 14–18)
IMM GRANULOCYTES # BLD AUTO: 0.03 K/UL (ref 0–0.11)
IMM GRANULOCYTES NFR BLD AUTO: 0.4 % (ref 0–0.9)
LYMPHOCYTES # BLD AUTO: 1.53 K/UL (ref 1–4.8)
LYMPHOCYTES NFR BLD: 20.6 % (ref 22–41)
MCH RBC QN AUTO: 30.3 PG (ref 27–33)
MCHC RBC AUTO-ENTMCNC: 32.7 G/DL (ref 32.3–36.5)
MCV RBC AUTO: 92.8 FL (ref 81.4–97.8)
MONOCYTES # BLD AUTO: 0.71 K/UL (ref 0–0.85)
MONOCYTES NFR BLD AUTO: 9.6 % (ref 0–13.4)
NEUTROPHILS # BLD AUTO: 4.94 K/UL (ref 1.82–7.42)
NEUTROPHILS NFR BLD: 66.6 % (ref 44–72)
NRBC # BLD AUTO: 0 K/UL
NRBC BLD-RTO: 0 /100 WBC (ref 0–0.2)
NT-PROBNP SERPL IA-MCNC: 3812 PG/ML (ref 0–125)
PLATELET # BLD AUTO: 201 K/UL (ref 164–446)
PMV BLD AUTO: 10.1 FL (ref 9–12.9)
POTASSIUM SERPL-SCNC: 4.2 MMOL/L (ref 3.6–5.5)
PROT SERPL-MCNC: 6.6 G/DL (ref 6–8.2)
RBC # BLD AUTO: 4.85 M/UL (ref 4.7–6.1)
RSV RNA SPEC QL NAA+PROBE: NEGATIVE
SARS-COV-2 RNA RESP QL NAA+PROBE: NOTDETECTED
SODIUM SERPL-SCNC: 139 MMOL/L (ref 135–145)
TROPONIN T SERPL-MCNC: 14 NG/L (ref 6–19)
WBC # BLD AUTO: 7.4 K/UL (ref 4.8–10.8)

## 2025-06-10 PROCEDURE — 84484 ASSAY OF TROPONIN QUANT: CPT

## 2025-06-10 PROCEDURE — 93005 ELECTROCARDIOGRAM TRACING: CPT | Mod: TC

## 2025-06-10 PROCEDURE — 302449 STATCHG TRIAGE ONLY (STATISTIC)

## 2025-06-10 PROCEDURE — 80053 COMPREHEN METABOLIC PANEL: CPT

## 2025-06-10 PROCEDURE — 85025 COMPLETE CBC W/AUTO DIFF WBC: CPT

## 2025-06-10 PROCEDURE — 83880 ASSAY OF NATRIURETIC PEPTIDE: CPT

## 2025-06-10 PROCEDURE — 0241U HCHG SARS-COV-2 COVID-19 NFCT DS RESP RNA 4 TRGT ED POC: CPT

## 2025-06-10 ASSESSMENT — FIBROSIS 4 INDEX: FIB4 SCORE: 0.96

## 2025-06-10 NOTE — ED NOTES
"Pt ambulatory passed triage desk stating \"I'm going to a different hospital\". Pt continued to ambulate out of ER lobby prior to signing AMA/LWBS forms. Pt observed by this tech and other staff to be getting in car and driving away. Pt will be dismissed as LWBS at this time per [employer].   "

## 2025-06-10 NOTE — ED TRIAGE NOTES
Chief Complaint   Patient presents with    Syncope     Pt reports syncopal event around 6am. +dizziness    Shortness of Breath     Pt reports SOB with cough over last few days. +headaches, +chest pain    Ambulatory to triage for above complaint with family. Pt reports ongoing SOB with chest pain and cough over last few days. +headache, +chills. Pt reports syncopal event this AM.     Pt placed in lobby and educated on triage process. Pt encouraged to alert staff for any changes.    Protocol ordered.     Patient and staff wearing appropriate PPE.

## (undated) DEVICE — CANISTER SUCTION 3000ML MECHANICAL FILTER AUTO SHUTOFF MEDI-VAC NONSTERILE LF DISP  (40EA/CA)

## (undated) DEVICE — ELECTRODE DUAL RETURN W/ CORD - (50/PK)

## (undated) DEVICE — GLOVE BIOGEL PI ORTHO SZ 7.5 PF LF (40PR/BX)

## (undated) DEVICE — SET EXTENSION WITH 2 PORTS (48EA/CA) ***PART #2C8610 IS A SUBSTITUTE*****

## (undated) DEVICE — SET LEADWIRE 5 LEAD BEDSIDE DISPOSABLE ECG (1SET OF 5/EA)

## (undated) DEVICE — SUTURE GENERAL

## (undated) DEVICE — BAG SPONGE COUNT 10.25 X 32 - BLUE (250/CA)

## (undated) DEVICE — DRAPE U ORTHOPEDIC - (10/BX)

## (undated) DEVICE — DRESSING TRANSPARENT FILM TEGADERM 4 X 4.75" (50EA/BX)"

## (undated) DEVICE — SUTURE 2-0 VICRYL PLUS CT-1 36 (36PK/BX)"

## (undated) DEVICE — DRAPE C ARMOR (12EA/CA)

## (undated) DEVICE — DRESSING LEUKOMED STERILE 11.75X4IN - (50/CA)

## (undated) DEVICE — DRAPE 36X28IN RAD CARM BND BG - (25/CA) O

## (undated) DEVICE — WATER IRRIGATION STERILE 1000ML (12EA/CA)

## (undated) DEVICE — BOVIE BLADE COATED - (50/PK)

## (undated) DEVICE — DRAPE LARGE 3 QUARTER - (20/CA)

## (undated) DEVICE — GOWN WARMING STANDARD FLEX - (30/CA)

## (undated) DEVICE — TUBING CLEARLINK DUO-VENT - C-FLO (48EA/CA)

## (undated) DEVICE — SODIUM CHL IRRIGATION 0.9% 1000ML (12EA/CA)

## (undated) DEVICE — STAPLER SKIN DISP - (6/BX 10BX/CA) VISISTAT

## (undated) DEVICE — PENCIL ELECTSURG 10FT BTN SWH - (50/CA)

## (undated) DEVICE — GLOVE BIOGEL INDICATOR SZ 7.5 SURGICAL PF LTX - (50PR/BX 4BX/CA)

## (undated) DEVICE — SENSOR OXIMETER ADULT SPO2 RD SET (20EA/BX)

## (undated) DEVICE — SLEEVE, VASO, THIGH, MED

## (undated) DEVICE — PACK MAJOR ORTHO - (2EA/CA)

## (undated) DEVICE — SUCTION INSTRUMENT YANKAUER BULBOUS TIP W/O VENT (50EA/CA)

## (undated) DEVICE — SUCTION INSTRUMENT YANKAUER OPEN TIP W/O VENT (50EA/CA)

## (undated) DEVICE — DRAPE STRLE REG TOWEL 18X24 - (10/BX 4BX/CA)"

## (undated) DEVICE — TOWEL STOP TIMEOUT SAFETY FLAG (40EA/CA)

## (undated) DEVICE — LACTATED RINGERS INJ 1000 ML - (14EA/CA 60CA/PF)

## (undated) DEVICE — DRAPE SURG STERI-DRAPE 7X11OD - (40EA/CA)

## (undated) DEVICE — SUTURE 0 VICRYL PLUS CT-1 - 36 INCH (36/BX)

## (undated) DEVICE — COVER LIGHT HANDLE ALC PLUS DISP (18EA/BX)